# Patient Record
Sex: MALE | Employment: OTHER | URBAN - METROPOLITAN AREA
[De-identification: names, ages, dates, MRNs, and addresses within clinical notes are randomized per-mention and may not be internally consistent; named-entity substitution may affect disease eponyms.]

---

## 2017-06-23 ENCOUNTER — HOSPITAL ENCOUNTER (OUTPATIENT)
Dept: CARDIOLOGY | Facility: MEDICAL CENTER | Age: 57
End: 2017-06-23
Attending: INTERNAL MEDICINE
Payer: COMMERCIAL

## 2017-06-23 DIAGNOSIS — C49.A2 GASTROINTESTINAL STROMAL TUMOR OF STOMACH (HCC): ICD-10-CM

## 2017-06-23 PROCEDURE — 93306 TTE W/DOPPLER COMPLETE: CPT

## 2017-06-23 PROCEDURE — 93306 TTE W/DOPPLER COMPLETE: CPT | Mod: 26 | Performed by: INTERNAL MEDICINE

## 2017-06-24 LAB
LV EJECT FRACT  99904: 70
LV EJECT FRACT MOD 2C 99903: 70.32
LV EJECT FRACT MOD 4C 99902: 71.28
LV EJECT FRACT MOD BP 99901: 71.79

## 2017-12-26 ENCOUNTER — HOSPITAL ENCOUNTER (OUTPATIENT)
Dept: RADIOLOGY | Facility: MEDICAL CENTER | Age: 57
End: 2017-12-26
Attending: INTERNAL MEDICINE
Payer: COMMERCIAL

## 2017-12-26 DIAGNOSIS — C49.A9 GASTROINTESTINAL STROMAL TUMOR OF OTHER SITES (HCC): ICD-10-CM

## 2017-12-26 PROCEDURE — 76870 US EXAM SCROTUM: CPT

## 2018-05-14 ENCOUNTER — HOSPITAL ENCOUNTER (OUTPATIENT)
Dept: RADIOLOGY | Facility: MEDICAL CENTER | Age: 58
End: 2018-05-14
Attending: SURGERY
Payer: COMMERCIAL

## 2018-05-14 DIAGNOSIS — C49.A2 MALIGNANT GASTRIC STROMAL TUMOR (HCC): ICD-10-CM

## 2018-05-14 PROCEDURE — 700117 HCHG RX CONTRAST REV CODE 255: Performed by: SURGERY

## 2018-05-14 PROCEDURE — 71260 CT THORAX DX C+: CPT

## 2018-05-14 RX ADMIN — IOHEXOL 100 ML: 350 INJECTION, SOLUTION INTRAVENOUS at 15:56

## 2018-05-22 ENCOUNTER — HOSPITAL ENCOUNTER (OUTPATIENT)
Facility: MEDICAL CENTER | Age: 58
End: 2018-05-22
Attending: INTERNAL MEDICINE
Payer: COMMERCIAL

## 2018-05-22 PROCEDURE — 80053 COMPREHEN METABOLIC PANEL: CPT

## 2018-05-23 LAB
ALBUMIN SERPL BCP-MCNC: 4.3 G/DL (ref 3.2–4.9)
ALBUMIN/GLOB SERPL: 2 G/DL
ALP SERPL-CCNC: 76 U/L (ref 30–99)
ALT SERPL-CCNC: 19 U/L (ref 2–50)
ANION GAP SERPL CALC-SCNC: 5 MMOL/L (ref 0–11.9)
AST SERPL-CCNC: 16 U/L (ref 12–45)
BILIRUB SERPL-MCNC: 0.7 MG/DL (ref 0.1–1.5)
BUN SERPL-MCNC: 15 MG/DL (ref 8–22)
CALCIUM SERPL-MCNC: 9.5 MG/DL (ref 8.5–10.5)
CHLORIDE SERPL-SCNC: 107 MMOL/L (ref 96–112)
CO2 SERPL-SCNC: 26 MMOL/L (ref 20–33)
CREAT SERPL-MCNC: 1.01 MG/DL (ref 0.5–1.4)
GLOBULIN SER CALC-MCNC: 2.1 G/DL (ref 1.9–3.5)
GLUCOSE SERPL-MCNC: 94 MG/DL (ref 65–99)
POTASSIUM SERPL-SCNC: 4.1 MMOL/L (ref 3.6–5.5)
PROT SERPL-MCNC: 6.4 G/DL (ref 6–8.2)
SODIUM SERPL-SCNC: 138 MMOL/L (ref 135–145)

## 2018-06-07 DIAGNOSIS — J45.909 ASTHMA, UNSPECIFIED ASTHMA SEVERITY, UNSPECIFIED WHETHER COMPLICATED, UNSPECIFIED WHETHER PERSISTENT: ICD-10-CM

## 2018-06-20 ENCOUNTER — APPOINTMENT (RX ONLY)
Dept: URBAN - METROPOLITAN AREA CLINIC 4 | Facility: CLINIC | Age: 58
Setting detail: DERMATOLOGY
End: 2018-06-20

## 2018-06-20 DIAGNOSIS — D18.0 HEMANGIOMA: ICD-10-CM

## 2018-06-20 DIAGNOSIS — L57.0 ACTINIC KERATOSIS: ICD-10-CM

## 2018-06-20 DIAGNOSIS — D22 MELANOCYTIC NEVI: ICD-10-CM

## 2018-06-20 DIAGNOSIS — L81.4 OTHER MELANIN HYPERPIGMENTATION: ICD-10-CM

## 2018-06-20 DIAGNOSIS — L82.1 OTHER SEBORRHEIC KERATOSIS: ICD-10-CM

## 2018-06-20 PROBLEM — D18.01 HEMANGIOMA OF SKIN AND SUBCUTANEOUS TISSUE: Status: ACTIVE | Noted: 2018-06-20

## 2018-06-20 PROBLEM — J45.909 UNSPECIFIED ASTHMA, UNCOMPLICATED: Status: ACTIVE | Noted: 2018-06-20

## 2018-06-20 PROBLEM — D22.5 MELANOCYTIC NEVI OF TRUNK: Status: ACTIVE | Noted: 2018-06-20

## 2018-06-20 PROCEDURE — ? LIQUID NITROGEN

## 2018-06-20 PROCEDURE — 17003 DESTRUCT PREMALG LES 2-14: CPT

## 2018-06-20 PROCEDURE — 99202 OFFICE O/P NEW SF 15 MIN: CPT | Mod: 25

## 2018-06-20 PROCEDURE — 17000 DESTRUCT PREMALG LESION: CPT

## 2018-06-20 PROCEDURE — ? COUNSELING

## 2018-06-20 ASSESSMENT — LOCATION SIMPLE DESCRIPTION DERM
LOCATION SIMPLE: ANTERIOR SCALP
LOCATION SIMPLE: LEFT BUTTOCK
LOCATION SIMPLE: LEFT FOREHEAD
LOCATION SIMPLE: RIGHT BUTTOCK
LOCATION SIMPLE: RIGHT FOREHEAD
LOCATION SIMPLE: CHEST
LOCATION SIMPLE: SCALP
LOCATION SIMPLE: RIGHT SCALP
LOCATION SIMPLE: UPPER BACK
LOCATION SIMPLE: RIGHT HAND
LOCATION SIMPLE: RIGHT EYEBROW
LOCATION SIMPLE: LEFT POSTERIOR THIGH

## 2018-06-20 ASSESSMENT — LOCATION ZONE DERM
LOCATION ZONE: TRUNK
LOCATION ZONE: SCALP
LOCATION ZONE: LEG
LOCATION ZONE: HAND
LOCATION ZONE: FACE

## 2018-06-20 ASSESSMENT — LOCATION DETAILED DESCRIPTION DERM
LOCATION DETAILED: RIGHT CENTRAL PARIETAL SCALP
LOCATION DETAILED: RIGHT LATERAL EYEBROW
LOCATION DETAILED: MID-FRONTAL SCALP
LOCATION DETAILED: LEFT MEDIAL INFERIOR CHEST
LOCATION DETAILED: RIGHT RADIAL DORSAL HAND
LOCATION DETAILED: RIGHT SUPERIOR FOREHEAD
LOCATION DETAILED: LEFT INFERIOR LATERAL FOREHEAD
LOCATION DETAILED: LEFT SUPERIOR PARIETAL SCALP
LOCATION DETAILED: RIGHT CENTRAL FRONTAL SCALP
LOCATION DETAILED: LEFT INFERIOR MEDIAL FOREHEAD
LOCATION DETAILED: LEFT BUTTOCK
LOCATION DETAILED: RIGHT BUTTOCK
LOCATION DETAILED: INFERIOR THORACIC SPINE
LOCATION DETAILED: LEFT DISTAL POSTERIOR THIGH

## 2018-06-20 NOTE — PROCEDURE: LIQUID NITROGEN
Number Of Freeze-Thaw Cycles: 1 freeze-thaw cycle
Post-Care Instructions: I reviewed with the patient in detail post-care instructions. Patient is to wear sunprotection, and avoid picking at any of the treated lesions. Pt may apply Vaseline to crusted or scabbing areas.
Consent: The patient's consent was obtained including but not limited to risks of crusting, scabbing, blistering, scarring, darker or lighter pigmentary change, recurrence, incomplete removal and infection.
Detail Level: Detailed
Render Post-Care Instructions In Note?: yes
Duration Of Freeze Thaw-Cycle (Seconds): 5

## 2018-06-25 DIAGNOSIS — J45.909 UNCOMPLICATED ASTHMA, UNSPECIFIED ASTHMA SEVERITY, UNSPECIFIED WHETHER PERSISTENT: ICD-10-CM

## 2018-06-26 ENCOUNTER — APPOINTMENT (OUTPATIENT)
Dept: PULMONOLOGY | Facility: HOSPICE | Age: 58
End: 2018-06-26
Payer: COMMERCIAL

## 2018-08-29 ENCOUNTER — OFFICE VISIT (OUTPATIENT)
Dept: MEDICAL GROUP | Facility: PHYSICIAN GROUP | Age: 58
End: 2018-08-29
Payer: COMMERCIAL

## 2018-08-29 VITALS
HEIGHT: 67 IN | HEART RATE: 68 BPM | SYSTOLIC BLOOD PRESSURE: 118 MMHG | DIASTOLIC BLOOD PRESSURE: 84 MMHG | OXYGEN SATURATION: 98 % | TEMPERATURE: 97.7 F | RESPIRATION RATE: 16 BRPM | WEIGHT: 179 LBS | BODY MASS INDEX: 28.09 KG/M2

## 2018-08-29 DIAGNOSIS — Z23 NEED FOR VACCINATION WITH 13-POLYVALENT PNEUMOCOCCAL CONJUGATE VACCINE: ICD-10-CM

## 2018-08-29 DIAGNOSIS — C49.A2 GASTROINTESTINAL STROMAL TUMOR OF STOMACH (HCC): ICD-10-CM

## 2018-08-29 DIAGNOSIS — J45.909 ASTHMA WITHOUT STATUS ASTHMATICUS WITHOUT COMPLICATION, UNSPECIFIED ASTHMA SEVERITY, UNSPECIFIED WHETHER PERSISTENT: ICD-10-CM

## 2018-08-29 DIAGNOSIS — Z23 NEED FOR TDAP VACCINATION: ICD-10-CM

## 2018-08-29 DIAGNOSIS — Z00.00 WELLNESS EXAMINATION: ICD-10-CM

## 2018-08-29 PROBLEM — R91.8 MULTIPLE NODULES OF LUNG: Status: ACTIVE | Noted: 2018-05-22

## 2018-08-29 PROCEDURE — 90472 IMMUNIZATION ADMIN EACH ADD: CPT | Performed by: INTERNAL MEDICINE

## 2018-08-29 PROCEDURE — 90715 TDAP VACCINE 7 YRS/> IM: CPT | Performed by: INTERNAL MEDICINE

## 2018-08-29 PROCEDURE — 90471 IMMUNIZATION ADMIN: CPT | Performed by: INTERNAL MEDICINE

## 2018-08-29 PROCEDURE — 99203 OFFICE O/P NEW LOW 30 MIN: CPT | Mod: 25 | Performed by: INTERNAL MEDICINE

## 2018-08-29 PROCEDURE — 90670 PCV13 VACCINE IM: CPT | Performed by: INTERNAL MEDICINE

## 2018-08-29 RX ORDER — METRONIDAZOLE 500 MG/1
TABLET ORAL
COMMUNITY
End: 2018-08-26

## 2018-08-29 RX ORDER — ONDANSETRON HYDROCHLORIDE 8 MG/1
TABLET, FILM COATED ORAL
COMMUNITY
End: 2018-08-27

## 2018-08-29 RX ORDER — ALBUTEROL SULFATE 2.5 MG/3ML
SOLUTION RESPIRATORY (INHALATION)
COMMUNITY
End: 2022-11-15 | Stop reason: SDUPTHER

## 2018-08-29 RX ORDER — ALBUTEROL SULFATE 90 UG/1
1 AEROSOL, METERED RESPIRATORY (INHALATION) EVERY 6 HOURS PRN
Qty: 3 INHALER | Refills: 1 | Status: SHIPPED | OUTPATIENT
Start: 2018-08-29 | End: 2018-08-29 | Stop reason: SDUPTHER

## 2018-08-29 RX ORDER — ALBUTEROL SULFATE 90 UG/1
AEROSOL, METERED RESPIRATORY (INHALATION)
COMMUNITY
End: 2018-08-29 | Stop reason: SDUPTHER

## 2018-08-29 RX ORDER — SILDENAFIL 100 MG/1
TABLET, FILM COATED ORAL
COMMUNITY
End: 2019-07-10

## 2018-08-29 RX ORDER — IMATINIB MESYLATE 400 MG/1
TABLET, FILM COATED ORAL
Refills: 6 | COMMUNITY
Start: 2018-08-20 | End: 2018-08-26

## 2018-08-29 RX ORDER — ALBUTEROL SULFATE 90 UG/1
AEROSOL, METERED RESPIRATORY (INHALATION)
COMMUNITY
End: 2018-08-26

## 2018-08-29 RX ORDER — ALBUTEROL SULFATE 90 UG/1
1 AEROSOL, METERED RESPIRATORY (INHALATION) EVERY 6 HOURS PRN
Qty: 3 INHALER | Refills: 3 | Status: SHIPPED | OUTPATIENT
Start: 2018-08-29 | End: 2019-05-30 | Stop reason: SDUPTHER

## 2018-08-29 RX ORDER — IMATINIB MESYLATE 400 MG/1
TABLET, FILM COATED ORAL
COMMUNITY
End: 2022-11-15

## 2018-08-29 RX ORDER — HYDROCODONE BITARTRATE AND ACETAMINOPHEN 7.5; 325 MG/1; MG/1
TABLET ORAL
COMMUNITY
End: 2018-08-26

## 2018-08-29 ASSESSMENT — PATIENT HEALTH QUESTIONNAIRE - PHQ9: CLINICAL INTERPRETATION OF PHQ2 SCORE: 0

## 2018-08-29 NOTE — LETTER
[facility]  Clif Tejeda M.D.  [address]  [place] [address]  Fax: [phone]   Authorization for Release/Disclosure of   Protected Health Information   Name: JUNE PACE : 1960 SSN: [id number]   Address: [address] [place] [address] [place] [address] Phone:    [phone] (home)    I authorize the entity listed below to release/disclose the PHI below to:   [facility]/Clif Tejeda M.D. and Clif Tejeda M.D.   Provider or Entity Name:  [facility]   Address   [facility], [facility], Zip            [address] [facility] [place] [address] Phone:  [phone]      Fax:       [phone]          Reason for request: continuity of care   Information to be released:    [ X ] LAST COLONOSCOPY,  including any PATH REPORT and follow-up  [ X ] LAST FIT/COLOGUARD RESULT [  ] LAST DEXA  [  ] LAST MAMMOGRAM  [  ] LAST PAP  [  ] LAST LABS [  ] RETINA EXAM REPORT  [  ] IMMUNIZATION RECORDS  [  ] Release all info      [  ] Check here and initial the line next to each item to release ALL health information INCLUDING  _____ Care and treatment for drug and / or alcohol abuse  _____ HIV testing, infection status, or AIDS  _____ Genetic Testing    DATES OF SERVICE OR TIME PERIOD TO BE DISCLOSED: _____________  I understand and acknowledge that:  * This Authorization may be revoked at any time by you in writing, except if your health information has already been used or disclosed.  * Your health information that will be used or disclosed as a result of you signing this authorization could be re-disclosed by the recipient. If this occurs, your re-disclosed health information may no longer be protected by State or Federal laws.  * You may refuse to sign this Authorization. Your refusal will not affect your ability to obtain treatment.  * This Authorization becomes effective upon signing and will  on (date) __________.      If no date is indicated, this Authorization will  one (1) year from the signature date.     Name: Prashant Shah Kash       Date:     8/29/2018       PLEASE FAX REQUESTED RECORDS BACK TO: [phone]

## 2018-08-29 NOTE — ASSESSMENT & PLAN NOTE
Has an appointment tomorrow with Dr. Easley. Has had asthma since he was an infant. He is on advair and takes one puff of ventolin every morning. Rarely needs the ventolin for emergencies.

## 2018-08-29 NOTE — LETTER
[facility]  Clif Tejeda M.D.  [address]  [facility] [address]  Fax: [phone]   Authorization for Release/Disclosure of   Protected Health Information   Name: JUNE PACE : 1960 SSN: [id number]   Address: [address] [place] [address] [place] [address] Phone:    [phone] (home)    I authorize the entity listed below to release/disclose the PHI below to:   [facility]/Clif Tejeda M.D. and Clif Tejeda M.D.   Provider or Entity Name:  [facility]   Address   City, [facility] [address], Zip:               [address] [place] [address]   Phone:  [phone]      Fax:      [phone]        Reason for request: continuity of care   Information to be released:    [ X ] LAST COLONOSCOPY,  including any PATH REPORT and follow-up  [ X ] LAST FIT/COLOGUARD RESULT [  ] LAST DEXA  [  ] LAST MAMMOGRAM  [  ] LAST PAP  [  ] LAST LABS [  ] RETINA EXAM REPORT  [  ] IMMUNIZATION RECORDS  [  ] Release all info      [  ] Check here and initial the line next to each item to release ALL health information INCLUDING  _____ Care and treatment for drug and / or alcohol abuse  _____ HIV testing, infection status, or AIDS  _____ Genetic Testing    DATES OF SERVICE OR TIME PERIOD TO BE DISCLOSED: _____________  I understand and acknowledge that:  * This Authorization may be revoked at any time by you in writing, except if your health information has already been used or disclosed.  * Your health information that will be used or disclosed as a result of you signing this authorization could be re-disclosed by the recipient. If this occurs, your re-disclosed health information may no longer be protected by State or Federal laws.  * You may refuse to sign this Authorization. Your refusal will not affect your ability to obtain treatment.  * This Authorization becomes effective upon signing and will  on (date) __________.      If no date is indicated, this Authorization will  one (1) year from the  signature date.    Name: Prashant Hewitt     Date:     8/29/2018       PLEASE FAX REQUESTED RECORDS BACK TO: [phone]

## 2018-08-29 NOTE — ASSESSMENT & PLAN NOTE
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 Is following with his oncologist Dr. Jin. Has been on gleevec for about a year.

## 2018-08-29 NOTE — PROGRESS NOTES
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 Is following with his oncologist Dr. Jin. Has been on gleevec for about a year.     Asthma without status asthmaticus  Has an appointment tomorrow with Dr. Easley. Has had asthma since he was an infant. He is on advair and takes one puff of ventolin every morning. Rarely needs the ventolin for emergencies.       Current Outpatient Prescriptions   Medication Sig Dispense Refill   • imatinib (GLEEVEC) 400 MG tablet imatinib 400 mg tablet     • sildenafil citrate (VIAGRA) 100 MG tablet Viagra 100 mg tablet   TAKE 1 TABLET BY MOUTH BEFORE SEXUAL ACTIVITY     • fluticasone-salmeterol (ADVAIR DISKUS) 250-50 MCG/DOSE AEROSOL POWDER, BREATH ACTIVATED Inhale 1 Puff by mouth 2 times a day. 3 Inhaler 0   • albuterol (VENTOLIN HFA) 108 (90 Base) MCG/ACT Aero Soln inhalation aerosol Inhale 1 Puff by mouth every 6 hours as needed for Shortness of Breath. 3 Inhaler 1   • albuterol (PROVENTIL) 2.5mg/3ml Nebu Soln solution for nebulization albuterol sulfate 2.5 mg/3 mL (0.083 %) solution for nebulization       No current facility-administered medications for this visit.        Past Medical History:   Diagnosis Date   • Asthma without status asthmaticus 5/22/2018   • Gastrointestinal stromal tumor of stomach (HCC) 4/4/2017 March 2017 18 cm GIST tumor removed Dr. Jin, Dr. Isabel (surgeon)      Past Surgical History:   Procedure Laterality Date   • CHOLECYSTECTOMY     • FINGER AMPUTATION Right     right index accident when 3 yo   • OTHER      GIST removal 3/2017   • SEPTOPLASTY       Social History   Substance Use Topics   • Smoking status: Never Smoker   • Smokeless tobacco: Never Used   • Alcohol use No      Comment: not while on gleevec      Social History     Social History Narrative  "   ; owns own business      Family History   Problem Relation Age of Onset   • Hypertension Mother    • Other Mother         glaucoma    • Cancer Father         pancreatic      Family Status   Relation Status   • Mo Alive   • Fa  at age 80   • Sis Alive   • Bro Alive   • Sis Alive   • Sis Alive   • Sis Alive     Allergies: Penicillins and Succinylcholine    ROS  Constitutional: Negative for fatigue/generalized weakness.   HEENT: Negative for  vision changes, hearing changes    Respiratory: Negative for shortness of breath  Cardiovascular: Negative for chest pain, palpitations  Gastrointestinal: Negative for blood in stool, constipation, diarrhea  Genitourinary: Negative for dysuria, polyuria  Musculoskeletal: Negative for myalgias, back pain, and joint pain.   Skin: Negative for rash  Neurological: Negative for numbness, tingling  Psychiatric/Behavioral: Negative for depression, anxiety       Objective   Blood pressure 118/84, pulse 68, temperature 36.5 °C (97.7 °F), resp. rate 16, height 1.7 m (5' 6.93\"), weight 81.2 kg (179 lb), SpO2 98 %. Body mass index is 28.09 kg/m².    General: Alert, oriented. In no acute distress   HEET: EOMI, PERRL, conjunctiva non-injected, sclera non-icteric.  Nares patent with no significant congestion or drainage.  Ivory pinnae, external auditory canals, TM pearly gray with normal light reflex bilaterally.Oral mucous membranes pink and moist with no lesions.  Neck: supple with no cervical, subclavicular lymphadenopathy, JVD, palpable thyroid nodules   Lungs: clear to auscultation bilaterally with good excursion.  CV: regular rate and rhythm.  Abdomen soft, non-distended, non-tender with normal bowel sounds. No hepatosplenomegaly, no masses palpated  Skin: no lesions. Warm, dry   Psychiatric: appropriate mood and affect       Assessment and Plan   The following treatment plan was discussed     1. Gastrointestinal stromal tumor of stomach (HCC)  On " gleevec, following with Dr. Jin.     2. Need for vaccination with 13-polyvalent pneumococcal conjugate vaccine  Given today.   - Prevnar 13 PCV-13    3. Wellness examination  Had normal CMP through Dr. Jin 5/2018. Will complete remainder of his blood work panel with the following.   - CBC WITH DIFFERENTIAL; Future  - LIPID PROFILE; Future  - VITAMIN D,25 HYDROXY; Future    4. Asthma without status asthmaticus without complication, unspecified asthma severity, unspecified whether persistent  Paper prescriptions given per patient request.   - fluticasone-salmeterol (ADVAIR DISKUS) 250-50 MCG/DOSE AEROSOL POWDER, BREATH ACTIVATED; Inhale 1 Puff by mouth 2 times a day.  Dispense: 3 Inhaler; Refill: 0  - albuterol (VENTOLIN HFA) 108 (90 Base) MCG/ACT Aero Soln inhalation aerosol; Inhale 1 Puff by mouth every 6 hours as needed for Shortness of Breath.  Dispense: 3 Inhaler; Refill: 1    5. Need for Tdap vaccination  Given today.   - TDAP VACCINE =>6YO IM      Return in about 1 year (around 8/29/2019).    Health Maintenance      Health Maintenance Due   Topic Date Due   • COLONOSCOPY  11/30/2010   • IMM ZOSTER VACCINES (1 of 2) 11/30/2010   • IMM INFLUENZA (1) 09/01/2018     Discussed Shingrix (presently on back order in this office)  Will request colonoscopy records    Clif Tejeda MD  Internal Medicine  [facility]

## 2018-08-30 ENCOUNTER — APPOINTMENT (OUTPATIENT)
Dept: RADIOLOGY | Facility: IMAGING CENTER | Age: 58
End: 2018-08-30
Payer: COMMERCIAL

## 2018-08-30 ENCOUNTER — NON-PROVIDER VISIT (OUTPATIENT)
Dept: PULMONOLOGY | Facility: HOSPICE | Age: 58
End: 2018-08-30
Payer: COMMERCIAL

## 2018-08-30 ENCOUNTER — OFFICE VISIT (OUTPATIENT)
Dept: PULMONOLOGY | Facility: HOSPICE | Age: 58
End: 2018-08-30
Payer: COMMERCIAL

## 2018-08-30 VITALS
BODY MASS INDEX: 27.94 KG/M2 | TEMPERATURE: 97.2 F | HEART RATE: 73 BPM | RESPIRATION RATE: 16 BRPM | DIASTOLIC BLOOD PRESSURE: 80 MMHG | WEIGHT: 178 LBS | HEIGHT: 67 IN | OXYGEN SATURATION: 96 % | SYSTOLIC BLOOD PRESSURE: 126 MMHG

## 2018-08-30 VITALS — HEIGHT: 67 IN | WEIGHT: 178 LBS | BODY MASS INDEX: 27.94 KG/M2

## 2018-08-30 DIAGNOSIS — Z78.9 NONSMOKER: ICD-10-CM

## 2018-08-30 DIAGNOSIS — J45.909 ASTHMA, UNSPECIFIED ASTHMA SEVERITY, UNSPECIFIED WHETHER COMPLICATED, UNSPECIFIED WHETHER PERSISTENT: ICD-10-CM

## 2018-08-30 DIAGNOSIS — C49.A2 GASTROINTESTINAL STROMAL TUMOR OF STOMACH (HCC): ICD-10-CM

## 2018-08-30 DIAGNOSIS — J45.40 MODERATE PERSISTENT ASTHMA WITHOUT COMPLICATION: ICD-10-CM

## 2018-08-30 DIAGNOSIS — J45.909 UNCOMPLICATED ASTHMA, UNSPECIFIED ASTHMA SEVERITY, UNSPECIFIED WHETHER PERSISTENT: ICD-10-CM

## 2018-08-30 DIAGNOSIS — R91.8 PULMONARY NODULES: ICD-10-CM

## 2018-08-30 PROCEDURE — 71046 X-RAY EXAM CHEST 2 VIEWS: CPT | Mod: TC,FY | Performed by: INTERNAL MEDICINE

## 2018-08-30 PROCEDURE — 94726 PLETHYSMOGRAPHY LUNG VOLUMES: CPT | Performed by: INTERNAL MEDICINE

## 2018-08-30 PROCEDURE — 99244 OFF/OP CNSLTJ NEW/EST MOD 40: CPT | Mod: 25 | Performed by: INTERNAL MEDICINE

## 2018-08-30 PROCEDURE — 94729 DIFFUSING CAPACITY: CPT | Performed by: INTERNAL MEDICINE

## 2018-08-30 PROCEDURE — 94060 EVALUATION OF WHEEZING: CPT | Performed by: INTERNAL MEDICINE

## 2018-08-30 ASSESSMENT — PULMONARY FUNCTION TESTS
FEV1/FVC_PERCENT_CHANGE: 250
FEV1_PERCENT_CHANGE: 2
FEV1/FVC_PERCENT_PREDICTED: 63
FEV1_PREDICTED: 3.31
FVC: 3.34
FEV1_LLN: 2.76
FEV1_PERCENT_PREDICTED: 50
FEV1: 1.66
FVC_PERCENT_PREDICTED: 75
FEV1/FVC: 48
FEV1_PERCENT_PREDICTED: 47
FEV1/FVC_PERCENT_PREDICTED: 63
FEV1_PERCENT_CHANGE: 5
FEV1/FVC_PERCENT_PREDICTED: 65
FVC: 3.27
FEV1/FVC: 50
FEV1/FVC_PERCENT_LLN: 63
FVC_PERCENT_PREDICTED: 76
FVC_PREDICTED: 4.35
FEV1/FVC_PREDICTED: 76
FEV1/FVC_PERCENT_CHANGE: 3
FEV1: 1.57
FEV1/FVC: 48
FEV1/FVC: 49.7
FEV1/FVC_PERCENT_PREDICTED: 66
FVC_LLN: 3.63
FEV1/FVC_PERCENT_PREDICTED: 76

## 2018-08-30 NOTE — PROGRESS NOTES
"Chief Complaint   Patient presents with   • Establish Care     Referred by  for Asthma,Nodules       HPI: This patient is a pleasant 57 y.o. Male who is referred to lung nodule clinic for asthma and pulmonary nodules.  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  He had a CT chest/abdomen on May 14, 2018 which was personally reviewed and showed clustered nodules in the left upper lobe, with bilateral apical pleural scarring, and multiple calcified nodules, the latter consistent with granulomas.  [place]  He has had asthma since the age of 3, and has been on Advair 250/50 for over 20 years.  Growing up he had multiple episodes of \"pneumonia\".  He uses Advair 250/50 QD only with Albuterol HFA prophylactically before Advair.  He is a lifelong non-smoker.  He denies asthma exacerbations over the past year, however feels asthma symptoms have gradually worsened over the past years. Asthma triggers include exercise and \"extreme cold\".  Asthma symptoms include wheezing and chest tightness.  He denies any significant cough. He walks a mile daily.  Pulmonary function testing show moderately severe obstructive airways with FEV1 1.66 L or 50%, FEV1/FVC :50% predicted.  Denies fevers, chills, night sweats.  He had significant weight loss following his surgery, however denies any further weight loss in the past year.       Past Medical History:   Diagnosis Date   • Asthma without status asthmaticus 5/22/2018   • Bronchitis    • Chickenpox    • Gastrointestinal stromal tumor of stomach (HCC) 4/4/2017 March 2017 18 cm GIST tumor removed Dr. Jin, Dr. Isabel (surgeon)    • GERD (gastroesophageal reflux disease)    • Mumps    • Pneumonia        Social History     Social History   • Marital status:      Spouse name: N/A   • Number of children: N/A   • Years of education: N/A     Occupational " History   • Not on file.     Social History Main Topics   • Smoking status: Never Smoker   • Smokeless tobacco: Never Used   • Alcohol use No      Comment: not while on gleevec    • Drug use: No   • Sexual activity: Not on file     Other Topics Concern   • Not on file     Social History Narrative    ; owns own business        Family History   Problem Relation Age of Onset   • Hypertension Mother    • Other Mother         glaucoma    • Cancer Father         pancreatic    • Hypertension Brother        Current Outpatient Prescriptions on File Prior to Visit   Medication Sig Dispense Refill   • albuterol (PROVENTIL) 2.5mg/3ml Nebu Soln solution for nebulization albuterol sulfate 2.5 mg/3 mL (0.083 %) solution for nebulization     • imatinib (GLEEVEC) 400 MG tablet imatinib 400 mg tablet     • sildenafil citrate (VIAGRA) 100 MG tablet Viagra 100 mg tablet   TAKE 1 TABLET BY MOUTH BEFORE SEXUAL ACTIVITY     • fluticasone-salmeterol (ADVAIR DISKUS) 250-50 MCG/DOSE AEROSOL POWDER, BREATH ACTIVATED Inhale 1 Puff by mouth 2 times a day. 3 Inhaler 3   • albuterol (VENTOLIN HFA) 108 (90 Base) MCG/ACT Aero Soln inhalation aerosol Inhale 1 Puff by mouth every 6 hours as needed for Shortness of Breath. 3 Inhaler 3     No current facility-administered medications on file prior to visit.        Allergies: Penicillins and Succinylcholine    ROS:   Constitutional: Denies fevers, chills, night sweats, fatigue or weight loss  Eyes: Denies vision loss, pain, drainage, double vision  Ears, Nose, Throat: Denies earache, difficulty hearing, +tinnitus, denies nasal congestion, hoarseness  Cardiovascular: Denies chest pain, tightness, palpitations, orthopnea or edema  Respiratory: As in HPI  Sleep: Denies daytime sleepiness, snoring, apneas, insomnia, morning headaches  GI: Denies heartburn, dysphagia, nausea, +abdominal pain, denies diarrhea or constipation  : Denies frequent urination, hematuria, discharge or  "painful urination  Musculoskeletal: Denies back pain, painful joints, sore muscles  Neurological: Denies weakness or headaches  Skin: No rashes    Blood pressure 126/80, pulse 73, temperature 36.2 °C (97.2 °F), resp. rate 16, height 1.702 m (5' 7\"), weight 80.7 kg (178 lb), SpO2 96 %.    Physical Exam:  Appearance: Well-nourished, well-developed, in no acute distress  HEENT: Normocephalic, atraumatic, white sclera, PERRLA, oropharynx clear  Neck: No adenopathy or masses  Respiratory: no intercostal retractions or accessory muscle use  Lungs auscultation: Clear to auscultation bilaterally  Cardiovascular: Regular rate rhythm. No murmurs, rubs or gallops.  No LE edema  Abdomen: soft, nondistended  Gait: Normal  Digits: No clubbing, cyanosis  Motor: No focal deficits  Orientation: Oriented to time, person and place    Diagnosis:  1. Pulmonary nodules  QUANTIFERON TB GOLD (IN TUBE)    COCCIDIOIDAL SEROLOGY    HISTOPLASMA ABS QN DID    ASPERGILLUS AB QN DID   2. Moderate persistent asthma without complication  Fluticasone-Umeclidin-Vilant (TRELEGY ELLIPTA) 100-62.5-25 MCG/INH AEROSOL POWDER, BREATH ACTIVATED    AMB SPIROMETRY   3. Gastrointestinal stromal tumor of stomach (HCC)     4. Nonsmoker         Plan:  The patient has incidental finding of clustered pulmonary nodules on chest imaging.  He also has calcified nodules, consistent with benign granulomas.  He has a history of gastrointestinal stromal tumor, however these rarely metastasize to lung (2%).  He is immunosuppressed on chemotherapy, and infection vs postinflammatory nodules are more likely than malignancy.  Recommend infectious workup with QuantiFERON TB Gold, histoplasma, aspergillus and Coccidioides antibodies.  At this time recommend follow-up chest imaging within 3 months to reassess.  His asthma symptoms have gradually worsened and his pulmonary function testing shows significant pulmonary impairment, with FEV1 1.66 L or 50% predicted.  Recommend " discontinuing Advair and starting Trelegy Ellipta 1 puff QD to maximize inhaled bronchodilator therapy.  He will follow up with spirometry.  Return in about 2 months (around 10/30/2018) for with spirometry.

## 2018-08-30 NOTE — PROCEDURES
"Technician Nimo Miller, [facility]  Tech Comments:Good patient effort & cooperation. Test was performed on the Ensemble Discovery Body Plethysmograph-Elite DX system.    The predicted sets used for Spirometry are NHanesIII, for Lung Volumes are ITS, and for DLCO is [facility].  The results of this test appear to be valid, although the ATS standard for \"end of test\" was not met.  The DLCO was uncorrected for Hb.   A bronchodilator of Ventolin HFA- 2puffs via spacer was administered. The DLCO was performed during the dialation period.       The FVC is 3.34 L or 76%, FEV1 is 1.66 L or 50%, FEV1/FVC 50%.  %.  DLCO 131%.  No significant bronchodilator response.    Interpretation:  PFT show moderately severe obstructive ventilatory defect.  "

## 2018-09-04 ENCOUNTER — TELEPHONE (OUTPATIENT)
Dept: PULMONOLOGY | Facility: HOSPICE | Age: 58
End: 2018-09-04

## 2018-09-04 NOTE — TELEPHONE ENCOUNTER
Gwendolyn with [facility] left a voicemail for this patient. In regards to the Trelegy inhaler. She would like to know the tried and failed medications for this patient. May need a prior auth started for trelegy inhaler. Her phone number is [phone].

## 2018-09-10 ENCOUNTER — TELEPHONE (OUTPATIENT)
Dept: PULMONOLOGY | Facility: HOSPICE | Age: 58
End: 2018-09-10

## 2018-09-10 NOTE — TELEPHONE ENCOUNTER
MEDICATION PRIOR AUTHORIZATION NEEDED:    1. Name of Medication: Trelegy Ellipta 100/62.5/25 mcg    2. Requested By (Name of Pharmacy):      3. Is insurance on file current? yes    4. What is the name & phone number of the 3rd party payor? [facility] [phone]

## 2018-09-10 NOTE — TELEPHONE ENCOUNTER
DOCUMENTATION OF PRIOR AUTH STATUS    1. Medication name and dose: Trelegy Ellipta 100/62.5/25 mcg    2. Name and Phone # of Prescription coverage company: [employer] [phone]    3. Date Prior Auth was submitted:     4. What information was given to obtain insurance decision: I did not submit this    5. Prior Auth letter Approved or Denied: Denied    6. Pharmacy notified: No    7. Patient notified: No        Trelegy Ellipta was denied.  The patient needs to try Tudorza Pressair.  Dx is Asthma.  Please advise, thank you.

## 2018-09-11 NOTE — TELEPHONE ENCOUNTER
I spoke to the patient and let him know that Trelegy was denied by his insurance.  I told him that he needs to restart Advair and that we will send Tudorza to his pharmacy to use with the Advair.  He gave me his pharmacy and told me that is fine, but he wants to finish the Trelegy that he has on hand first.  I told him that I will send the Tudorza on Friday and he already has Advair.

## 2018-09-11 NOTE — TELEPHONE ENCOUNTER
He should not take Tudorza alone as he has asthma.    Let him know the Trelegy was not approved.    He needs to RESTART Advair and add the Tudorza.    Need pharmacy info to send RX

## 2018-09-14 DIAGNOSIS — J44.9 CHRONIC OBSTRUCTIVE PULMONARY DISEASE, UNSPECIFIED COPD TYPE (HCC): ICD-10-CM

## 2018-09-14 NOTE — TELEPHONE ENCOUNTER
Have we ever prescribed this med? No.  If yes, what date?     Last OV: 8/30/2018    Next OV: 11/8/2018    DX: COPD    Medications: Tudorza

## 2018-10-03 ENCOUNTER — HOSPITAL ENCOUNTER (OUTPATIENT)
Dept: LAB | Facility: MEDICAL CENTER | Age: 58
End: 2018-10-03
Attending: INTERNAL MEDICINE
Payer: COMMERCIAL

## 2018-10-03 DIAGNOSIS — R79.89 LOW VITAMIN D LEVEL: ICD-10-CM

## 2018-10-03 DIAGNOSIS — R91.8 PULMONARY NODULES: ICD-10-CM

## 2018-10-03 DIAGNOSIS — Z00.00 WELLNESS EXAMINATION: ICD-10-CM

## 2018-10-03 LAB
25(OH)D3 SERPL-MCNC: 14 NG/ML (ref 30–100)
BASOPHILS # BLD AUTO: 0.3 % (ref 0–1.8)
BASOPHILS # BLD: 0.01 K/UL (ref 0–0.12)
CHOLEST SERPL-MCNC: 173 MG/DL (ref 100–199)
EOSINOPHIL # BLD AUTO: 0.19 K/UL (ref 0–0.51)
EOSINOPHIL NFR BLD: 5 % (ref 0–6.9)
ERYTHROCYTE [DISTWIDTH] IN BLOOD BY AUTOMATED COUNT: 47.8 FL (ref 35.9–50)
HCT VFR BLD AUTO: 44.7 % (ref 42–52)
HDLC SERPL-MCNC: 37 MG/DL
HGB BLD-MCNC: 14.8 G/DL (ref 14–18)
IMM GRANULOCYTES # BLD AUTO: 0.01 K/UL (ref 0–0.11)
IMM GRANULOCYTES NFR BLD AUTO: 0.3 % (ref 0–0.9)
LDLC SERPL CALC-MCNC: 112 MG/DL
LYMPHOCYTES # BLD AUTO: 0.84 K/UL (ref 1–4.8)
LYMPHOCYTES NFR BLD: 22.3 % (ref 22–41)
MCH RBC QN AUTO: 31.5 PG (ref 27–33)
MCHC RBC AUTO-ENTMCNC: 33.1 G/DL (ref 33.7–35.3)
MCV RBC AUTO: 95.1 FL (ref 81.4–97.8)
MONOCYTES # BLD AUTO: 0.25 K/UL (ref 0–0.85)
MONOCYTES NFR BLD AUTO: 6.6 % (ref 0–13.4)
NEUTROPHILS # BLD AUTO: 2.47 K/UL (ref 1.82–7.42)
NEUTROPHILS NFR BLD: 65.5 % (ref 44–72)
NRBC # BLD AUTO: 0 K/UL
NRBC BLD-RTO: 0 /100 WBC
PLATELET # BLD AUTO: 208 K/UL (ref 164–446)
PMV BLD AUTO: 10.9 FL (ref 9–12.9)
RBC # BLD AUTO: 4.7 M/UL (ref 4.7–6.1)
TRIGL SERPL-MCNC: 118 MG/DL (ref 0–149)
WBC # BLD AUTO: 3.8 K/UL (ref 4.8–10.8)

## 2018-10-03 PROCEDURE — 82306 VITAMIN D 25 HYDROXY: CPT

## 2018-10-03 PROCEDURE — 36415 COLL VENOUS BLD VENIPUNCTURE: CPT

## 2018-10-03 PROCEDURE — 86606 ASPERGILLUS ANTIBODY: CPT | Mod: 91

## 2018-10-03 PROCEDURE — 86698 HISTOPLASMA ANTIBODY: CPT

## 2018-10-03 PROCEDURE — 86480 TB TEST CELL IMMUN MEASURE: CPT

## 2018-10-03 PROCEDURE — 86635 COCCIDIOIDES ANTIBODY: CPT | Mod: 91

## 2018-10-03 PROCEDURE — 85025 COMPLETE CBC W/AUTO DIFF WBC: CPT

## 2018-10-03 PROCEDURE — 80061 LIPID PANEL: CPT

## 2018-10-03 RX ORDER — ERGOCALCIFEROL 1.25 MG/1
50000 CAPSULE ORAL
Qty: 12 CAP | Refills: 0 | Status: SHIPPED | OUTPATIENT
Start: 2018-10-03 | End: 2019-07-10

## 2018-10-05 LAB
M TB TUBERC IFN-G BLD QL: NEGATIVE
M TB TUBERC IFN-G/MITOGEN IGNF BLD: 0
M TB TUBERC IGNF/MITOGEN IGNF CONTROL: 49.62 [IU]/ML
MITOGEN IGNF BCKGRD COR BLD-ACNC: 0.05 [IU]/ML

## 2018-10-06 ENCOUNTER — OFFICE VISIT (OUTPATIENT)
Dept: URGENT CARE | Facility: PHYSICIAN GROUP | Age: 58
End: 2018-10-06
Payer: COMMERCIAL

## 2018-10-06 VITALS
RESPIRATION RATE: 18 BRPM | TEMPERATURE: 97.9 F | HEART RATE: 64 BPM | SYSTOLIC BLOOD PRESSURE: 138 MMHG | OXYGEN SATURATION: 95 % | DIASTOLIC BLOOD PRESSURE: 76 MMHG

## 2018-10-06 DIAGNOSIS — B02.9 HERPES ZOSTER WITHOUT COMPLICATION: ICD-10-CM

## 2018-10-06 PROCEDURE — 99203 OFFICE O/P NEW LOW 30 MIN: CPT | Performed by: EMERGENCY MEDICINE

## 2018-10-06 RX ORDER — CALCIUM ACETATE MONOHYDRATE AND ALUMINUM SULFATE TETRADECAHYDRATE 952; 1347 MG/2299MG; MG/2299MG
1 POWDER, FOR SOLUTION TOPICAL 3 TIMES DAILY
Qty: 3 PACKET | Refills: 1 | Status: SHIPPED | OUTPATIENT
Start: 2018-10-06 | End: 2019-07-10

## 2018-10-06 RX ORDER — ACYCLOVIR 400 MG/1
800 TABLET ORAL
Qty: 100 TAB | Refills: 1 | Status: SHIPPED | OUTPATIENT
Start: 2018-10-06 | End: 2019-07-10

## 2018-10-06 ASSESSMENT — ENCOUNTER SYMPTOMS
ABDOMINAL PAIN: 0
BACK PAIN: 0
VOMITING: 0
PALPITATIONS: 0
NAUSEA: 0
CHILLS: 0
COUGH: 0
FEVER: 0

## 2018-10-06 NOTE — PROGRESS NOTES
Subjective:      Prashant Hewitt is a 57 y.o. male who presents with Rash (poss shingles )            HPI  Patient is a pleasant 57-year-old male complaining of a rash on his right lower abdomen radiating towards his groin. Skin present since yesterday vesicular in nature.  Patient thinks she may have herpes zoster was offered herpes vaccine recently but declined. Patient is currently undergoing chemotherapy for gastric cancer status post gastrectomy. Patient denies any fever chills nausea vomiting or diarrhea.    Past Medical History:   Diagnosis Date   • Asthma without status asthmaticus 5/22/2018   • Bronchitis    • Cancer (HCC)    • Chickenpox    • Gastrointestinal stromal tumor of stomach (HCC) 4/4/2017 March 2017 18 cm GIST tumor removed Dr. Jin, Dr. Isabel (surgeon)    • GERD (gastroesophageal reflux disease)    • Mumps    • Pneumonia       Review of Systems   Constitutional: Negative for chills and fever.   Respiratory: Negative for cough.    Cardiovascular: Negative for chest pain and palpitations.   Gastrointestinal: Negative for abdominal pain, nausea and vomiting.   Genitourinary: Negative.    Musculoskeletal: Negative for back pain.   Skin: Positive for rash. Negative for itching.        Painful right sided lower abdomen towards the groin, rash. Refer to the diagram.          Objective:     /76 (BP Location: Right arm, Patient Position: Sitting, BP Cuff Size: Small adult)   Pulse 64   Temp 36.6 °C (97.9 °F) (Temporal)   Resp 18   SpO2 95%      Physical Exam   Constitutional: He appears well-developed and well-nourished. No distress.       HENT:   Head: Normocephalic and atraumatic.   Right Ear: External ear normal.   Left Ear: External ear normal.   Cardiovascular: Normal rate and regular rhythm.    Pulmonary/Chest: Effort normal and breath sounds normal.   Musculoskeletal: Normal range of motion. He exhibits no tenderness.   Skin: Skin is warm. No rash noted. He is not diaphoretic.  There is erythema.   Patient has a 4 x 10 cm area of 1-2 mm raised erythematous lesions no obvious vesicles but the rash is only been present for one day. Refer to the diagram.   Psychiatric: He has a normal mood and affect.   Nursing note and vitals reviewed.              Assessment/Plan:     1. Herpes zoster without complication      Patient will be given acyclovir 5 times a day as well as Domeboro's packets to use on a when necessary basis. I told him this should last up to 10 days but could last longer and he could be left with postherpetic neuralgia he will consider vaccination once this is cleared up and talk it over with Dr. Tejeda  - acyclovir (ZOVIRAX) 400 MG tablet; Take 2 Tabs by mouth 5 Times a Day.  Dispense: 100 Tab; Refill: 1  - calcium acetate-aluminum sulfate (DOMEBORO) Pack; Apply 1 Packet to affected area(s) 3 times a day.  Dispense: 3 Packet; Refill: 1

## 2018-10-07 LAB
ASPERGILLUS AB SER QL ID: NORMAL
ASPERGILLUS AB TITR SER CF: NORMAL {TITER}
H CAPSUL AB TITR SER ID: NORMAL {TITER}

## 2018-10-10 LAB
C IMMITIS IGM SPEC QL IA: 0.5 IV
COCCIDIOIDES AB SPEC QL ID: NORMAL
COCCIDIOIDES AB TITR SER CF: NORMAL {TITER}
COCCIDIOIDES IGG SPEC QL IA: 0.3 IV

## 2018-11-05 ENCOUNTER — HOSPITAL ENCOUNTER (OUTPATIENT)
Dept: RADIOLOGY | Facility: MEDICAL CENTER | Age: 58
End: 2018-11-05
Attending: INTERNAL MEDICINE
Payer: COMMERCIAL

## 2018-11-05 DIAGNOSIS — C49.A0 GASTROINTESTINAL STROMAL TUMOR (GIST) (HCC): ICD-10-CM

## 2018-11-05 PROCEDURE — 71260 CT THORAX DX C+: CPT

## 2018-11-05 PROCEDURE — 700117 HCHG RX CONTRAST REV CODE 255: Performed by: INTERNAL MEDICINE

## 2018-11-05 RX ADMIN — IOHEXOL 100 ML: 350 INJECTION, SOLUTION INTRAVENOUS at 09:41

## 2018-11-05 RX ADMIN — IOHEXOL 50 ML: 240 INJECTION, SOLUTION INTRATHECAL; INTRAVASCULAR; INTRAVENOUS; ORAL at 09:26

## 2018-11-08 ENCOUNTER — OFFICE VISIT (OUTPATIENT)
Dept: PULMONOLOGY | Facility: HOSPICE | Age: 58
End: 2018-11-08
Payer: COMMERCIAL

## 2018-11-08 ENCOUNTER — NON-PROVIDER VISIT (OUTPATIENT)
Dept: PULMONOLOGY | Facility: HOSPICE | Age: 58
End: 2018-11-08
Payer: COMMERCIAL

## 2018-11-08 VITALS
BODY MASS INDEX: 27.15 KG/M2 | DIASTOLIC BLOOD PRESSURE: 82 MMHG | HEART RATE: 76 BPM | SYSTOLIC BLOOD PRESSURE: 120 MMHG | WEIGHT: 173 LBS | HEIGHT: 67 IN | OXYGEN SATURATION: 99 % | TEMPERATURE: 97.2 F | RESPIRATION RATE: 16 BRPM

## 2018-11-08 DIAGNOSIS — J45.40 MODERATE PERSISTENT ASTHMA WITHOUT COMPLICATION: ICD-10-CM

## 2018-11-08 DIAGNOSIS — Z78.9 NONSMOKER: ICD-10-CM

## 2018-11-08 DIAGNOSIS — J45.909 ASTHMA WITHOUT STATUS ASTHMATICUS WITHOUT COMPLICATION, UNSPECIFIED ASTHMA SEVERITY, UNSPECIFIED WHETHER PERSISTENT: ICD-10-CM

## 2018-11-08 DIAGNOSIS — R91.8 PULMONARY NODULES: ICD-10-CM

## 2018-11-08 DIAGNOSIS — C49.A2 GASTROINTESTINAL STROMAL TUMOR OF STOMACH (HCC): ICD-10-CM

## 2018-11-08 PROCEDURE — 94060 EVALUATION OF WHEEZING: CPT | Performed by: INTERNAL MEDICINE

## 2018-11-08 PROCEDURE — 99214 OFFICE O/P EST MOD 30 MIN: CPT | Mod: 25 | Performed by: INTERNAL MEDICINE

## 2018-11-08 ASSESSMENT — PULMONARY FUNCTION TESTS
FEV1/FVC_PERCENT_PREDICTED: 70
FVC_PERCENT_PREDICTED: 85
FEV1/FVC_PERCENT_PREDICTED: 67
FEV1/FVC: 51
FEV1_PREDICTED: 57
FEV1_PERCENT_PREDICTED: 56
FEV1_PERCENT_CHANGE: -5
FEV1_PREDICTED: 3.3
FVC_PREDICTED: 4.34
FEV1/FVC: 53.14
FEV1/FVC_PREDICTED: 76.04
FVC: 3.69
FVC: 3.5
FEV1: 1.89
FEV1_PERCENT_CHANGE: -1
FVC_PERCENT_PREDICTED: 80
FEV1: 1.86
FEV1/FVC_PERCENT_CHANGE: 20

## 2018-11-08 NOTE — PROGRESS NOTES
"Chief Complaint   Patient presents with   • Results     SPBA results       HPI: This patient is a pleasant 57 y.o. Male who returns for asthma and pulmonary nodules.  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  He had a CT chest/abdomen on May 14, 2018 which was personally reviewed and showed clustered nodules in the left upper lobe, with bilateral apical pleural scarring, and multiple calcified nodules, the latter consistent with granulomas. He has had asthma since the age of 3, and has been on Advair 250/50 for over 20 years. Growing up he had multiple episodes of \"pneumonia\".  He uses Advair 250/50 and started Tudorza inhaler last visit.  He is a lifelong non-smoker. Asthma triggers include exercise and \"extreme cold\".  Asthma symptoms include wheezing and chest tightness. Pulmonary function testing showed moderately severe obstructive airways with FEV1 1.66 L or 50%, FEV1/FVC :50% predicted.    Since starting Tudorza, his spirometry has improved with FEV1 1.89 L or 57%.  He had a follow-up chest CAT scan on November 5, 2018 which showed stable pulmonary micronodules, with a new 4 mm right apical nodule.  No consolidation appreciated.  His QuantiFERON TB Gold, cocci antibodies and Aspergillus antibodies are negative.  Denies fevers, chills, night sweats.  He had significant weight loss following his surgery, however denies any further weight loss in the past year.     Past Medical History:   Diagnosis Date   • Asthma without status asthmaticus 5/22/2018   • Bronchitis    • Cancer (HCC)    • Chickenpox    • Gastrointestinal stromal tumor of stomach (HCC) 4/4/2017 March 2017 18 cm GIST tumor removed Dr. Jin, Dr. Isabel (surgeon)    • GERD (gastroesophageal reflux disease)    • Mumps    • Pneumonia        Social History     Social History   • Marital status:      Spouse name: N/A   • Number of children: N/A   • Years of " education: N/A     Occupational History   • Not on file.     Social History Main Topics   • Smoking status: Never Smoker   • Smokeless tobacco: Never Used   • Alcohol use No      Comment: not while on gleevec    • Drug use: No   • Sexual activity: Not on file     Other Topics Concern   • Not on file     Social History Narrative    ; owns own business        Family History   Problem Relation Age of Onset   • Hypertension Mother    • Other Mother         glaucoma    • Cancer Father         pancreatic    • Hypertension Brother        Current Outpatient Prescriptions on File Prior to Visit   Medication Sig Dispense Refill   • vitamin D, Ergocalciferol, (DRISDOL) 29493 units Cap capsule Take 1 Cap by mouth every 7 days. 12 Cap 0   • Aclidinium Bromide (TUDORZA PRESSAIR) 400 MCG/ACT AEROSOL POWDER, BREATH ACTIVATED Inhale 1 Inhalation by mouth 2 Times a Day. 1 Each 5   • imatinib (GLEEVEC) 400 MG tablet imatinib 400 mg tablet     • sildenafil citrate (VIAGRA) 100 MG tablet Viagra 100 mg tablet   TAKE 1 TABLET BY MOUTH BEFORE SEXUAL ACTIVITY     • albuterol (VENTOLIN HFA) 108 (90 Base) MCG/ACT Aero Soln inhalation aerosol Inhale 1 Puff by mouth every 6 hours as needed for Shortness of Breath. 3 Inhaler 3   • acyclovir (ZOVIRAX) 400 MG tablet Take 2 Tabs by mouth 5 Times a Day. (Patient not taking: Reported on 11/8/2018) 100 Tab 1   • calcium acetate-aluminum sulfate (DOMEBORO) Pack Apply 1 Packet to affected area(s) 3 times a day. (Patient not taking: Reported on 11/8/2018) 3 Packet 1   • Fluticasone-Umeclidin-Vilant (TRELEGY ELLIPTA) 100-62.5-25 MCG/INH AEROSOL POWDER, BREATH ACTIVATED Inhale 1 Puff by mouth every day. (Patient not taking: Reported on 11/8/2018) 1 Each 6   • albuterol (PROVENTIL) 2.5mg/3ml Nebu Soln solution for nebulization albuterol sulfate 2.5 mg/3 mL (0.083 %) solution for nebulization       No current facility-administered medications on file prior to visit.   "      Allergies: Penicillins and Succinylcholine    ROS:   Constitutional: Denies fevers, chills, night sweats, fatigue or weight loss  Eyes: Denies vision loss, pain, drainage, double vision  Ears, Nose, Throat: Denies earache, difficulty hearing, tinnitus, nasal congestion, hoarseness  Cardiovascular: Denies chest pain, tightness, palpitations, orthopnea or edema  Respiratory: As in HPI  Sleep: Denies daytime sleepiness, snoring, apneas, insomnia, morning headaches  GI: Denies heartburn, dysphagia, nausea, abdominal pain, diarrhea or constipation  : Denies frequent urination, hematuria, discharge or painful urination  Musculoskeletal: Denies back pain, painful joints, sore muscles  Neurological: Denies weakness or headaches  Skin: +rash    Blood pressure 120/82, pulse 76, temperature 36.2 °C (97.2 °F), temperature source Temporal, resp. rate 16, height 1.702 m (5' 7\"), weight 78.5 kg (173 lb), SpO2 99 %.    Physical Exam:  Appearance: Well-nourished, well-developed, in no acute distress  HEENT: Normocephalic, atraumatic, white sclera, PERRLA, oropharynx clear  Neck: No adenopathy or masses  Respiratory: no intercostal retractions or accessory muscle use  Lungs auscultation: Clear to auscultation bilaterally  Cardiovascular: Regular rate rhythm. No murmurs, rubs or gallops.  No LE edema  Abdomen: soft, nondistended  Gait: Normal  Digits: No clubbing, cyanosis  Motor: No focal deficits  Orientation: Oriented to time, person and place    Diagnosis:  1. Pulmonary nodules  CT-CHEST (THORAX) W/O   2. Asthma without status asthmaticus without complication, unspecified asthma severity, unspecified whether persistent  fluticasone-salmeterol (ADVAIR DISKUS) 250-50 MCG/DOSE AEROSOL POWDER, BREATH ACTIVATED    Spirometry   3. Gastrointestinal stromal tumor of stomach (HCC)     4. Nonsmoker         Plan:  The patient has pulmonary micronodules, some calcified consistent with benign granulomas.  The remaining noncalcified " nodules are felt postinflammatory.  His QuantiFERON TB Gold test is negative.  Recommend follow-up with chest imaging in 6 months.  His asthma has improved with addition of inhaled anticholinergics.  Spirometry has improved.  Continue Advair 250/50 and Tudorza.  Return in about 6 months (around 5/8/2019) for after CT scan, with spirometry.

## 2019-01-25 ENCOUNTER — HOSPITAL ENCOUNTER (OUTPATIENT)
Dept: LAB | Facility: MEDICAL CENTER | Age: 59
End: 2019-01-25
Attending: INTERNAL MEDICINE
Payer: COMMERCIAL

## 2019-01-25 LAB
ALBUMIN SERPL BCP-MCNC: 4.1 G/DL (ref 3.2–4.9)
ALBUMIN/GLOB SERPL: 1.6 G/DL
ALP SERPL-CCNC: 47 U/L (ref 30–99)
ALT SERPL-CCNC: 15 U/L (ref 2–50)
ANION GAP SERPL CALC-SCNC: 6 MMOL/L (ref 0–11.9)
AST SERPL-CCNC: 18 U/L (ref 12–45)
BASOPHILS # BLD AUTO: 0 % (ref 0–1.8)
BASOPHILS # BLD: 0 K/UL (ref 0–0.12)
BILIRUB SERPL-MCNC: 0.5 MG/DL (ref 0.1–1.5)
BUN SERPL-MCNC: 11 MG/DL (ref 8–22)
CALCIUM SERPL-MCNC: 8.9 MG/DL (ref 8.5–10.5)
CHLORIDE SERPL-SCNC: 106 MMOL/L (ref 96–112)
CO2 SERPL-SCNC: 28 MMOL/L (ref 20–33)
CREAT SERPL-MCNC: 0.86 MG/DL (ref 0.5–1.4)
EOSINOPHIL # BLD AUTO: 0.17 K/UL (ref 0–0.51)
EOSINOPHIL NFR BLD: 4.7 % (ref 0–6.9)
ERYTHROCYTE [DISTWIDTH] IN BLOOD BY AUTOMATED COUNT: 45.7 FL (ref 35.9–50)
FASTING STATUS PATIENT QL REPORTED: NORMAL
GLOBULIN SER CALC-MCNC: 2.6 G/DL (ref 1.9–3.5)
GLUCOSE SERPL-MCNC: 98 MG/DL (ref 65–99)
HCT VFR BLD AUTO: 38 % (ref 42–52)
HGB BLD-MCNC: 12.9 G/DL (ref 14–18)
IMM GRANULOCYTES # BLD AUTO: 0.01 K/UL (ref 0–0.11)
IMM GRANULOCYTES NFR BLD AUTO: 0.3 % (ref 0–0.9)
LYMPHOCYTES # BLD AUTO: 0.94 K/UL (ref 1–4.8)
LYMPHOCYTES NFR BLD: 25.8 % (ref 22–41)
MCH RBC QN AUTO: 32.8 PG (ref 27–33)
MCHC RBC AUTO-ENTMCNC: 33.9 G/DL (ref 33.7–35.3)
MCV RBC AUTO: 96.7 FL (ref 81.4–97.8)
MONOCYTES # BLD AUTO: 0.25 K/UL (ref 0–0.85)
MONOCYTES NFR BLD AUTO: 6.8 % (ref 0–13.4)
NEUTROPHILS # BLD AUTO: 2.28 K/UL (ref 1.82–7.42)
NEUTROPHILS NFR BLD: 62.4 % (ref 44–72)
NRBC # BLD AUTO: 0 K/UL
NRBC BLD-RTO: 0 /100 WBC
PLATELET # BLD AUTO: 176 K/UL (ref 164–446)
PMV BLD AUTO: 10.5 FL (ref 9–12.9)
POTASSIUM SERPL-SCNC: 3.7 MMOL/L (ref 3.6–5.5)
PROT SERPL-MCNC: 6.7 G/DL (ref 6–8.2)
RBC # BLD AUTO: 3.93 M/UL (ref 4.7–6.1)
SODIUM SERPL-SCNC: 140 MMOL/L (ref 135–145)
WBC # BLD AUTO: 3.7 K/UL (ref 4.8–10.8)

## 2019-01-25 PROCEDURE — 36415 COLL VENOUS BLD VENIPUNCTURE: CPT

## 2019-01-25 PROCEDURE — 80053 COMPREHEN METABOLIC PANEL: CPT

## 2019-01-25 PROCEDURE — 85025 COMPLETE CBC W/AUTO DIFF WBC: CPT

## 2019-05-22 ENCOUNTER — HOSPITAL ENCOUNTER (OUTPATIENT)
Dept: RADIOLOGY | Facility: MEDICAL CENTER | Age: 59
End: 2019-05-22
Attending: INTERNAL MEDICINE
Payer: COMMERCIAL

## 2019-05-22 DIAGNOSIS — C49.A2 MALIGNANT GASTROINTESTINAL STROMAL TUMOR OF STOMACH (HCC): ICD-10-CM

## 2019-05-22 DIAGNOSIS — C49.A9 GASTROINTESTINAL STROMAL TUMOR OF OTHER SITES (HCC): ICD-10-CM

## 2019-05-22 PROCEDURE — 700117 HCHG RX CONTRAST REV CODE 255: Performed by: INTERNAL MEDICINE

## 2019-05-22 PROCEDURE — 71260 CT THORAX DX C+: CPT

## 2019-05-22 RX ADMIN — IOHEXOL 25 ML: 240 INJECTION, SOLUTION INTRATHECAL; INTRAVASCULAR; INTRAVENOUS; ORAL at 10:00

## 2019-05-22 RX ADMIN — IOHEXOL 100 ML: 350 INJECTION, SOLUTION INTRAVENOUS at 10:00

## 2019-05-28 ENCOUNTER — HOSPITAL ENCOUNTER (OUTPATIENT)
Dept: LAB | Facility: MEDICAL CENTER | Age: 59
End: 2019-05-28
Attending: INTERNAL MEDICINE
Payer: COMMERCIAL

## 2019-05-28 LAB
BASOPHILS # BLD AUTO: 0.2 % (ref 0–1.8)
BASOPHILS # BLD: 0.01 K/UL (ref 0–0.12)
EOSINOPHIL # BLD AUTO: 0.3 K/UL (ref 0–0.51)
EOSINOPHIL NFR BLD: 7 % (ref 0–6.9)
ERYTHROCYTE [DISTWIDTH] IN BLOOD BY AUTOMATED COUNT: 49.2 FL (ref 35.9–50)
HCT VFR BLD AUTO: 44.2 % (ref 42–52)
HGB BLD-MCNC: 14.6 G/DL (ref 14–18)
IMM GRANULOCYTES # BLD AUTO: 0.01 K/UL (ref 0–0.11)
IMM GRANULOCYTES NFR BLD AUTO: 0.2 % (ref 0–0.9)
LYMPHOCYTES # BLD AUTO: 0.88 K/UL (ref 1–4.8)
LYMPHOCYTES NFR BLD: 20.6 % (ref 22–41)
MCH RBC QN AUTO: 31.6 PG (ref 27–33)
MCHC RBC AUTO-ENTMCNC: 33 G/DL (ref 33.7–35.3)
MCV RBC AUTO: 95.7 FL (ref 81.4–97.8)
MONOCYTES # BLD AUTO: 0.33 K/UL (ref 0–0.85)
MONOCYTES NFR BLD AUTO: 7.7 % (ref 0–13.4)
NEUTROPHILS # BLD AUTO: 2.74 K/UL (ref 1.82–7.42)
NEUTROPHILS NFR BLD: 64.3 % (ref 44–72)
NRBC # BLD AUTO: 0 K/UL
NRBC BLD-RTO: 0 /100 WBC
PLATELET # BLD AUTO: 192 K/UL (ref 164–446)
PMV BLD AUTO: 10.3 FL (ref 9–12.9)
RBC # BLD AUTO: 4.62 M/UL (ref 4.7–6.1)
WBC # BLD AUTO: 4.3 K/UL (ref 4.8–10.8)

## 2019-05-28 PROCEDURE — 36415 COLL VENOUS BLD VENIPUNCTURE: CPT

## 2019-05-28 PROCEDURE — 80053 COMPREHEN METABOLIC PANEL: CPT

## 2019-05-28 PROCEDURE — 85025 COMPLETE CBC W/AUTO DIFF WBC: CPT

## 2019-05-29 LAB
ALBUMIN SERPL BCP-MCNC: 4.3 G/DL (ref 3.2–4.9)
ALBUMIN/GLOB SERPL: 2 G/DL
ALP SERPL-CCNC: 64 U/L (ref 30–99)
ALT SERPL-CCNC: 19 U/L (ref 2–50)
ANION GAP SERPL CALC-SCNC: 8 MMOL/L (ref 0–11.9)
AST SERPL-CCNC: 16 U/L (ref 12–45)
BILIRUB SERPL-MCNC: 0.7 MG/DL (ref 0.1–1.5)
BUN SERPL-MCNC: 13 MG/DL (ref 8–22)
CALCIUM SERPL-MCNC: 8.9 MG/DL (ref 8.5–10.5)
CHLORIDE SERPL-SCNC: 108 MMOL/L (ref 96–112)
CO2 SERPL-SCNC: 24 MMOL/L (ref 20–33)
CREAT SERPL-MCNC: 0.96 MG/DL (ref 0.5–1.4)
FASTING STATUS PATIENT QL REPORTED: NORMAL
GLOBULIN SER CALC-MCNC: 2.2 G/DL (ref 1.9–3.5)
GLUCOSE SERPL-MCNC: 97 MG/DL (ref 65–99)
POTASSIUM SERPL-SCNC: 4.2 MMOL/L (ref 3.6–5.5)
PROT SERPL-MCNC: 6.5 G/DL (ref 6–8.2)
SODIUM SERPL-SCNC: 140 MMOL/L (ref 135–145)

## 2019-05-30 ENCOUNTER — OFFICE VISIT (OUTPATIENT)
Dept: PULMONOLOGY | Facility: HOSPICE | Age: 59
End: 2019-05-30
Payer: COMMERCIAL

## 2019-05-30 ENCOUNTER — NON-PROVIDER VISIT (OUTPATIENT)
Dept: PULMONOLOGY | Facility: HOSPICE | Age: 59
End: 2019-05-30
Payer: COMMERCIAL

## 2019-05-30 VITALS
DIASTOLIC BLOOD PRESSURE: 60 MMHG | RESPIRATION RATE: 16 BRPM | HEART RATE: 77 BPM | WEIGHT: 180 LBS | OXYGEN SATURATION: 96 % | BODY MASS INDEX: 28.25 KG/M2 | SYSTOLIC BLOOD PRESSURE: 122 MMHG | HEIGHT: 67 IN

## 2019-05-30 VITALS — BODY MASS INDEX: 28.19 KG/M2 | WEIGHT: 180 LBS

## 2019-05-30 DIAGNOSIS — Z78.9 NONSMOKER: ICD-10-CM

## 2019-05-30 DIAGNOSIS — J45.40 MODERATE PERSISTENT ASTHMA WITHOUT COMPLICATION: ICD-10-CM

## 2019-05-30 DIAGNOSIS — C49.A2 GASTROINTESTINAL STROMAL TUMOR OF STOMACH (HCC): ICD-10-CM

## 2019-05-30 DIAGNOSIS — J44.9 CHRONIC OBSTRUCTIVE PULMONARY DISEASE, UNSPECIFIED COPD TYPE (HCC): ICD-10-CM

## 2019-05-30 DIAGNOSIS — J45.909 ASTHMA WITHOUT STATUS ASTHMATICUS WITHOUT COMPLICATION, UNSPECIFIED ASTHMA SEVERITY, UNSPECIFIED WHETHER PERSISTENT: ICD-10-CM

## 2019-05-30 DIAGNOSIS — R91.8 PULMONARY NODULES: ICD-10-CM

## 2019-05-30 PROCEDURE — 99214 OFFICE O/P EST MOD 30 MIN: CPT | Mod: 25 | Performed by: INTERNAL MEDICINE

## 2019-05-30 PROCEDURE — 94060 EVALUATION OF WHEEZING: CPT | Performed by: INTERNAL MEDICINE

## 2019-05-30 RX ORDER — ALBUTEROL SULFATE 90 UG/1
1 AEROSOL, METERED RESPIRATORY (INHALATION) EVERY 6 HOURS PRN
Qty: 1 INHALER | Refills: 1 | Status: SHIPPED | OUTPATIENT
Start: 2019-05-30 | End: 2021-08-16 | Stop reason: SDUPTHER

## 2019-05-30 ASSESSMENT — PULMONARY FUNCTION TESTS
FVC_PERCENT_PREDICTED: 75
FEV1/FVC_PREDICTED: 76.16
FEV1: 1.56
FEV1_PERCENT_PREDICTED: 56
FEV1/FVC_PERCENT_PREDICTED: 63
FVC_PERCENT_PREDICTED: 78
FEV1/FVC: 54.57
FVC_PREDICTED: 4.32
FVC: 3.24
FEV1_PERCENT_CHANGE: 18
FEV1_PERCENT_CHANGE: 4
FEV1/FVC_PERCENT_PREDICTED: 72
FEV1_PREDICTED: 47
FEV1_PREDICTED: 3.29
FVC: 3.39
FEV1: 1.85
FEV1/FVC_PERCENT_CHANGE: 450
FEV1/FVC: 48

## 2019-05-30 NOTE — PROCEDURES
Technician: Skye Correa [facility]  Tech notes:  Good pt effort and cooperation. ATS standards met.  Ventolin HFA 2 puffs via spacer administered.    The FVC is 3.39 L or 78%, FEV1 is 1.85 L or 56%, FEV1/FVC: 55%.  Significant bronchodilator response.      Interpretation:  Spirometry shows moderately severe obstructive ventilatory defect, with significant bronchodilator response, consistent with asthma.

## 2019-05-30 NOTE — PROGRESS NOTES
"Chief Complaint   Patient presents with   • Follow-Up     Pulmonary nodules     HPI: This patient is a pleasant 89y.o. Male who returns for asthma and pulmonary nodules.  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  He had a CT chest/abdomen on May 14, 2018 which was personally reviewed and showed clustered nodules in the left upper lobe, with bilateral apical pleural scarring, and multiple calcified nodules, the latter consistent with granulomas. He has had asthma since childhood, on Advair 250/50 and Tudorza inhaler.  He is a lifelong non-smoker. Asthma triggers include exercise and \"extreme cold\". Pulmonary function testing showed improved FEV1 to 1.85 L or 56% consistent with moderately severe obstructive ventilatory defect.  He showed significant bronchodilator response.    He had a follow-up chest CAT scan on November 5, 2018 which showed stable pulmonary micronodules, with a new 4 mm right apical nodule.  No consolidation appreciated.  His QuantiFERON TB Gold, cocci antibodies and Aspergillus antibodies are negative.  Denies cough, wheezing, dyspnea, fevers, chills, night sweats. Denies RUBY use.  Chest/abdominal CAT scan May 22, 2019 showed interval decrease in the size of the right lung nodule, stable remaining nodules.  The abdominal CAT scan however identified a new mass adjacent to the posterior wall of the stomach.  He also had a sclerotic lesion in T8 vertebra.  He is scheduled to see Dr. Pugh next week to address these new findings.  He has abdominal pain and feels \"like I did when I was diagnosed with cancer\".    Past Medical History:   Diagnosis Date   • Asthma without status asthmaticus 5/22/2018   • Bronchitis    • Cancer (HCC)    • Chickenpox    • Gastrointestinal stromal tumor of stomach (HCC) 4/4/2017 March 2017 18 cm GIST tumor removed Dr. Jin, Dr. Isabel (surgeon)    • GERD (gastroesophageal reflux disease)    • Mumps  "   • Pneumonia        Social History     Social History   • Marital status:      Spouse name: N/A   • Number of children: N/A   • Years of education: N/A     Occupational History   • Not on file.     Social History Main Topics   • Smoking status: Never Smoker   • Smokeless tobacco: Never Used   • Alcohol use No      Comment: not while on gleevec    • Drug use: No   • Sexual activity: Not on file     Other Topics Concern   • Not on file     Social History Narrative    ; owns own business        Family History   Problem Relation Age of Onset   • Hypertension Mother    • Other Mother         glaucoma    • Cancer Father         pancreatic    • Hypertension Brother        Current Outpatient Prescriptions on File Prior to Visit   Medication Sig Dispense Refill   • fluticasone-salmeterol (ADVAIR DISKUS) 250-50 MCG/DOSE AEROSOL POWDER, BREATH ACTIVATED Inhale 1 Puff by mouth 2 times a day. 3 Inhaler 3   • Aclidinium Bromide (TUDORZA PRESSAIR) 400 MCG/ACT AEROSOL POWDER, BREATH ACTIVATED Inhale 1 Inhalation by mouth 2 Times a Day. 1 Each 5   • imatinib (GLEEVEC) 400 MG tablet imatinib 400 mg tablet     • acyclovir (ZOVIRAX) 400 MG tablet Take 2 Tabs by mouth 5 Times a Day. (Patient not taking: Reported on 11/8/2018) 100 Tab 1   • calcium acetate-aluminum sulfate (DOMEBORO) Pack Apply 1 Packet to affected area(s) 3 times a day. (Patient not taking: Reported on 11/8/2018) 3 Packet 1   • vitamin D, Ergocalciferol, (DRISDOL) 50639 units Cap capsule Take 1 Cap by mouth every 7 days. (Patient not taking: Reported on 5/30/2019) 12 Cap 0   • Fluticasone-Umeclidin-Vilant (TRELEGY ELLIPTA) 100-62.5-25 MCG/INH AEROSOL POWDER, BREATH ACTIVATED Inhale 1 Puff by mouth every day. (Patient not taking: Reported on 11/8/2018) 1 Each 6   • albuterol (PROVENTIL) 2.5mg/3ml Nebu Soln solution for nebulization albuterol sulfate 2.5 mg/3 mL (0.083 %) solution for nebulization     • sildenafil citrate (VIAGRA) 100 MG  "tablet Viagra 100 mg tablet   TAKE 1 TABLET BY MOUTH BEFORE SEXUAL ACTIVITY     • albuterol (VENTOLIN HFA) 108 (90 Base) MCG/ACT Aero Soln inhalation aerosol Inhale 1 Puff by mouth every 6 hours as needed for Shortness of Breath. 3 Inhaler 3     No current facility-administered medications on file prior to visit.        Allergies: Penicillins and Succinylcholine    ROS:   Constitutional: Denies fevers, chills, night sweats, fatigue or weight loss  Eyes: Denies vision loss, pain, drainage, double vision  Ears, Nose, Throat: Denies earache, difficulty hearing, tinnitus, nasal congestion, hoarseness  Cardiovascular: Denies chest pain, tightness, palpitations, orthopnea or edema  Respiratory: Denies shortness of breath, cough, wheezing, hemoptysis  Sleep: Denies daytime sleepiness, snoring, apneas, insomnia, morning headaches  GI: Denies heartburn, dysphagia, nausea, +abdominal pain, denies diarrhea or constipation  : Denies frequent urination, hematuria, discharge or painful urination  Musculoskeletal: Denies back pain, painful joints, sore muscles  Neurological: Denies weakness or headaches  Skin: No rashes    /60 (BP Location: Left arm, Patient Position: Sitting, BP Cuff Size: Adult)   Pulse 77   Resp 16   Ht 1.702 m (5' 7\")   Wt 81.6 kg (180 lb)   SpO2 96%     Physical Exam:  Appearance: Well-nourished, well-developed, in no acute distress  HEENT: Normocephalic, atraumatic, white sclera, PERRLA, oropharynx clear  Neck: No adenopathy or masses  Respiratory: no intercostal retractions or accessory muscle use  Lungs auscultation: Clear to auscultation bilaterally  Cardiovascular: Regular rate rhythm. No murmurs, rubs or gallops.  No LE edema  Abdomen: soft, nondistended  Gait: Normal  Digits: No clubbing, cyanosis  Motor: No focal deficits  Back: no pain to palpation  Orientation: Oriented to time, person and place    Diagnosis:  1. Pulmonary nodules  CT-CHEST (THORAX) W/O   2. Moderate persistent asthma " without complication  Spirometry   3. Nonsmoker     4. Gastrointestinal stromal tumor of stomach (HCC)         Plan:  Ruddy's asthma has been stable with improved spirometry since adding inhaled anticholinergics.  Continue Advair 250/50 and Tudorza inhalers.  Prescription refills provided.  His pulmonary nodules are also stable and consistent with postinflammatory lesions.  Continue surveillance with chest CAT scan without contrast in 6 months.  More concerning however is the finding of a mass in his stomach and ?vertebral met.  He has an appointment pending with Dr. Pugh, oncologist, next week to address.  Return in about 6 months (around 11/30/2019) for after CT scan, with spirometry.

## 2019-06-17 ENCOUNTER — HOSPITAL ENCOUNTER (OUTPATIENT)
Dept: RADIOLOGY | Facility: MEDICAL CENTER | Age: 59
End: 2019-06-17
Attending: INTERNAL MEDICINE
Payer: COMMERCIAL

## 2019-06-17 DIAGNOSIS — C49.A2 MALIGNANT GASTRIC STROMAL TUMOR (HCC): ICD-10-CM

## 2019-06-17 PROCEDURE — A9552 F18 FDG: HCPCS

## 2019-07-10 DIAGNOSIS — Z01.810 PRE-OPERATIVE CARDIOVASCULAR EXAMINATION: ICD-10-CM

## 2019-07-10 DIAGNOSIS — Z01.812 PRE-OPERATIVE LABORATORY EXAMINATION: ICD-10-CM

## 2019-07-10 LAB
BASOPHILS # BLD AUTO: 0.2 % (ref 0–1.8)
BASOPHILS # BLD: 0.01 K/UL (ref 0–0.12)
EKG IMPRESSION: NORMAL
EOSINOPHIL # BLD AUTO: 0.34 K/UL (ref 0–0.51)
EOSINOPHIL NFR BLD: 7.9 % (ref 0–6.9)
ERYTHROCYTE [DISTWIDTH] IN BLOOD BY AUTOMATED COUNT: 47.2 FL (ref 35.9–50)
HCT VFR BLD AUTO: 41.1 % (ref 42–52)
HGB BLD-MCNC: 13.7 G/DL (ref 14–18)
IMM GRANULOCYTES # BLD AUTO: 0.01 K/UL (ref 0–0.11)
IMM GRANULOCYTES NFR BLD AUTO: 0.2 % (ref 0–0.9)
LYMPHOCYTES # BLD AUTO: 1 K/UL (ref 1–4.8)
LYMPHOCYTES NFR BLD: 23.3 % (ref 22–41)
MCH RBC QN AUTO: 31.4 PG (ref 27–33)
MCHC RBC AUTO-ENTMCNC: 33.3 G/DL (ref 33.7–35.3)
MCV RBC AUTO: 94.3 FL (ref 81.4–97.8)
MONOCYTES # BLD AUTO: 0.32 K/UL (ref 0–0.85)
MONOCYTES NFR BLD AUTO: 7.4 % (ref 0–13.4)
NEUTROPHILS # BLD AUTO: 2.62 K/UL (ref 1.82–7.42)
NEUTROPHILS NFR BLD: 61 % (ref 44–72)
NRBC # BLD AUTO: 0 K/UL
NRBC BLD-RTO: 0 /100 WBC
PLATELET # BLD AUTO: 190 K/UL (ref 164–446)
PMV BLD AUTO: 10.6 FL (ref 9–12.9)
RBC # BLD AUTO: 4.36 M/UL (ref 4.7–6.1)
WBC # BLD AUTO: 4.3 K/UL (ref 4.8–10.8)

## 2019-07-10 PROCEDURE — 93005 ELECTROCARDIOGRAM TRACING: CPT

## 2019-07-10 PROCEDURE — 85025 COMPLETE CBC W/AUTO DIFF WBC: CPT

## 2019-07-10 PROCEDURE — 80048 BASIC METABOLIC PNL TOTAL CA: CPT

## 2019-07-10 PROCEDURE — 93010 ELECTROCARDIOGRAM REPORT: CPT | Performed by: INTERNAL MEDICINE

## 2019-07-10 PROCEDURE — 36415 COLL VENOUS BLD VENIPUNCTURE: CPT

## 2019-07-10 NOTE — OR NURSING
"Reviewed \"Preparing for your procedure\". Instructed to take resp. meds DOS and to bring inhaler.   "

## 2019-07-11 LAB
ANION GAP SERPL CALC-SCNC: 10 MMOL/L (ref 0–11.9)
BUN SERPL-MCNC: 14 MG/DL (ref 8–22)
CALCIUM SERPL-MCNC: 9.7 MG/DL (ref 8.5–10.5)
CHLORIDE SERPL-SCNC: 107 MMOL/L (ref 96–112)
CO2 SERPL-SCNC: 23 MMOL/L (ref 20–33)
CREAT SERPL-MCNC: 1.06 MG/DL (ref 0.5–1.4)
GLUCOSE SERPL-MCNC: 101 MG/DL (ref 65–99)
POTASSIUM SERPL-SCNC: 3.8 MMOL/L (ref 3.6–5.5)
SODIUM SERPL-SCNC: 140 MMOL/L (ref 135–145)

## 2019-07-16 ENCOUNTER — ANESTHESIA EVENT (OUTPATIENT)
Dept: SURGERY | Facility: MEDICAL CENTER | Age: 59
End: 2019-07-16
Payer: COMMERCIAL

## 2019-07-16 ENCOUNTER — ANESTHESIA (OUTPATIENT)
Dept: SURGERY | Facility: MEDICAL CENTER | Age: 59
End: 2019-07-16
Payer: COMMERCIAL

## 2019-07-16 ENCOUNTER — HOSPITAL ENCOUNTER (OUTPATIENT)
Facility: MEDICAL CENTER | Age: 59
End: 2019-07-16
Attending: INTERNAL MEDICINE | Admitting: INTERNAL MEDICINE
Payer: COMMERCIAL

## 2019-07-16 VITALS
BODY MASS INDEX: 28.37 KG/M2 | SYSTOLIC BLOOD PRESSURE: 125 MMHG | TEMPERATURE: 97.5 F | RESPIRATION RATE: 16 BRPM | HEART RATE: 54 BPM | DIASTOLIC BLOOD PRESSURE: 65 MMHG | WEIGHT: 180.78 LBS | OXYGEN SATURATION: 99 % | HEIGHT: 67 IN

## 2019-07-16 LAB — PATHOLOGY CONSULT NOTE: NORMAL

## 2019-07-16 PROCEDURE — 700101 HCHG RX REV CODE 250: Performed by: ANESTHESIOLOGY

## 2019-07-16 PROCEDURE — 88341 IMHCHEM/IMCYTCHM EA ADD ANTB: CPT

## 2019-07-16 PROCEDURE — 160046 HCHG PACU - 1ST 60 MINS PHASE II: Performed by: INTERNAL MEDICINE

## 2019-07-16 PROCEDURE — 88307 TISSUE EXAM BY PATHOLOGIST: CPT

## 2019-07-16 PROCEDURE — 88172 CYTP DX EVAL FNA 1ST EA SITE: CPT

## 2019-07-16 PROCEDURE — 88341 IMHCHEM/IMCYTCHM EA ADD ANTB: CPT | Mod: 91

## 2019-07-16 PROCEDURE — 88323 CONSLTJ&REPRT MATRL PREP SLD: CPT

## 2019-07-16 PROCEDURE — 500066 HCHG BITE BLOCK, ECT: Performed by: INTERNAL MEDICINE

## 2019-07-16 PROCEDURE — 700111 HCHG RX REV CODE 636 W/ 250 OVERRIDE (IP)

## 2019-07-16 PROCEDURE — 160035 HCHG PACU - 1ST 60 MINS PHASE I: Performed by: INTERNAL MEDICINE

## 2019-07-16 PROCEDURE — 88342 IMHCHEM/IMCYTCHM 1ST ANTB: CPT

## 2019-07-16 PROCEDURE — 700105 HCHG RX REV CODE 258: Performed by: INTERNAL MEDICINE

## 2019-07-16 PROCEDURE — 160002 HCHG RECOVERY MINUTES (STAT): Performed by: INTERNAL MEDICINE

## 2019-07-16 PROCEDURE — 160025 RECOVERY II MINUTES (STATS): Performed by: INTERNAL MEDICINE

## 2019-07-16 PROCEDURE — 160208 HCHG ENDO MINUTES - EA ADDL 1 MIN LEVEL 4: Performed by: INTERNAL MEDICINE

## 2019-07-16 PROCEDURE — 88312 SPECIAL STAINS GROUP 1: CPT

## 2019-07-16 PROCEDURE — 160036 HCHG PACU - EA ADDL 30 MINS PHASE I: Performed by: INTERNAL MEDICINE

## 2019-07-16 PROCEDURE — 88177 CYTP FNA EVAL EA ADDL: CPT

## 2019-07-16 PROCEDURE — 88173 CYTOPATH EVAL FNA REPORT: CPT

## 2019-07-16 PROCEDURE — 160009 HCHG ANES TIME/MIN: Performed by: INTERNAL MEDICINE

## 2019-07-16 PROCEDURE — 160048 HCHG OR STATISTICAL LEVEL 1-5: Performed by: INTERNAL MEDICINE

## 2019-07-16 PROCEDURE — 700101 HCHG RX REV CODE 250: Performed by: INTERNAL MEDICINE

## 2019-07-16 PROCEDURE — 160203 HCHG ENDO MINUTES - 1ST 30 MINS LEVEL 4: Performed by: INTERNAL MEDICINE

## 2019-07-16 PROCEDURE — 88305 TISSUE EXAM BY PATHOLOGIST: CPT

## 2019-07-16 RX ORDER — HALOPERIDOL 5 MG/ML
1 INJECTION INTRAMUSCULAR
Status: DISCONTINUED | OUTPATIENT
Start: 2019-07-16 | End: 2019-07-16 | Stop reason: HOSPADM

## 2019-07-16 RX ORDER — MIDAZOLAM HYDROCHLORIDE 1 MG/ML
1 INJECTION INTRAMUSCULAR; INTRAVENOUS
Status: DISCONTINUED | OUTPATIENT
Start: 2019-07-16 | End: 2019-07-16 | Stop reason: HOSPADM

## 2019-07-16 RX ORDER — MEPERIDINE HYDROCHLORIDE 25 MG/ML
12.5 INJECTION INTRAMUSCULAR; INTRAVENOUS; SUBCUTANEOUS
Status: DISCONTINUED | OUTPATIENT
Start: 2019-07-16 | End: 2019-07-16 | Stop reason: HOSPADM

## 2019-07-16 RX ORDER — ONDANSETRON 2 MG/ML
4 INJECTION INTRAMUSCULAR; INTRAVENOUS
Status: DISCONTINUED | OUTPATIENT
Start: 2019-07-16 | End: 2019-07-16 | Stop reason: HOSPADM

## 2019-07-16 RX ORDER — SODIUM CHLORIDE, SODIUM LACTATE, POTASSIUM CHLORIDE, CALCIUM CHLORIDE 600; 310; 30; 20 MG/100ML; MG/100ML; MG/100ML; MG/100ML
INJECTION, SOLUTION INTRAVENOUS CONTINUOUS
Status: DISCONTINUED | OUTPATIENT
Start: 2019-07-16 | End: 2019-07-16 | Stop reason: HOSPADM

## 2019-07-16 RX ORDER — DIPHENHYDRAMINE HYDROCHLORIDE 50 MG/ML
12.5 INJECTION INTRAMUSCULAR; INTRAVENOUS
Status: DISCONTINUED | OUTPATIENT
Start: 2019-07-16 | End: 2019-07-16 | Stop reason: HOSPADM

## 2019-07-16 RX ORDER — HYDRALAZINE HYDROCHLORIDE 20 MG/ML
5 INJECTION INTRAMUSCULAR; INTRAVENOUS
Status: DISCONTINUED | OUTPATIENT
Start: 2019-07-16 | End: 2019-07-16 | Stop reason: HOSPADM

## 2019-07-16 RX ORDER — LIDOCAINE HYDROCHLORIDE 10 MG/ML
INJECTION, SOLUTION INFILTRATION; PERINEURAL
Status: DISCONTINUED
Start: 2019-07-16 | End: 2019-07-16 | Stop reason: HOSPADM

## 2019-07-16 RX ADMIN — PROPOFOL 30 MG: 10 INJECTION, EMULSION INTRAVENOUS at 09:02

## 2019-07-16 RX ADMIN — PROPOFOL 30 MG: 10 INJECTION, EMULSION INTRAVENOUS at 09:07

## 2019-07-16 RX ADMIN — PROPOFOL 30 MG: 10 INJECTION, EMULSION INTRAVENOUS at 09:16

## 2019-07-16 RX ADMIN — PROPOFOL 30 MG: 10 INJECTION, EMULSION INTRAVENOUS at 08:56

## 2019-07-16 RX ADMIN — PROPOFOL 30 MG: 10 INJECTION, EMULSION INTRAVENOUS at 09:12

## 2019-07-16 RX ADMIN — LIDOCAINE HYDROCHLORIDE 0.5 ML: 10 INJECTION, SOLUTION INFILTRATION; PERINEURAL at 08:30

## 2019-07-16 RX ADMIN — PROPOFOL 100 MG: 10 INJECTION, EMULSION INTRAVENOUS at 08:54

## 2019-07-16 RX ADMIN — FENTANYL CITRATE 50 MCG: 50 INJECTION INTRAMUSCULAR; INTRAVENOUS at 09:52

## 2019-07-16 RX ADMIN — PROPOFOL 20 MG: 10 INJECTION, EMULSION INTRAVENOUS at 08:58

## 2019-07-16 RX ADMIN — FENTANYL CITRATE 50 MCG: 50 INJECTION INTRAMUSCULAR; INTRAVENOUS at 10:20

## 2019-07-16 RX ADMIN — SODIUM CHLORIDE, POTASSIUM CHLORIDE, SODIUM LACTATE AND CALCIUM CHLORIDE: 600; 310; 30; 20 INJECTION, SOLUTION INTRAVENOUS at 08:51

## 2019-07-16 RX ADMIN — SODIUM CHLORIDE, POTASSIUM CHLORIDE, SODIUM LACTATE AND CALCIUM CHLORIDE: 600; 310; 30; 20 INJECTION, SOLUTION INTRAVENOUS at 08:30

## 2019-07-16 ASSESSMENT — PAIN SCALES - GENERAL: PAIN_LEVEL: 4

## 2019-07-16 NOTE — PROGRESS NOTES
Patient seen and examined prior to proceeding with EGD/EUS+/-FNA.  Risks, benefits, and alternatives of aforementioned procedures were discussed with patient and wife (Karen).  Consenting persons were given opportunities to ask questions and discuss other options.  Risks including but not limited to inaccurate staging, perforation, infection, bleeding, missed lesion(s), possible need for surgery and/or interventional radiology, possible need for repeat procedures and/or additional testing, hospitalization possibly prolonged, cardiac and/or pulmonary event, aspiration, hypoxia, stroke, medication and/or anesthesia reaction, indefinite diagnosis, discomfort, unsuccessful and/or incomplete procedure, ineffective therapy and/or persistent symptoms, damage to adjacent organs and/or vascular structures, and other adverse events possibly life-threatening.  Interactive discussion was undertaken with Layman's terms. I answered questions in full and to satisfaction.  Consenting persons stated understanding and acceptance of these risks, and wished to proceed.  Informed consent was given in clear state of mind.

## 2019-07-16 NOTE — PROGRESS NOTES
Immediately post-EGD/EUS-FNA patient experienced LUQ pain.  I instructed nurse to get a STAT abdominal x-ray but she did not order this.  Now, patient is patient free and doing well, stated that he burped and passed gas and felt much better and is asymptomatic now.  I examined him and his abdomen was flat, non-distended, not tympanic, without rebound and benign.  Thus, abdominal x-ray was not pursued.  I offered patient script for pain medication, he told me he did not need anything as he is feeling well.   I instructed him to call our office is with any problems or concerns and if his symptoms recur to go to ER immediately.  He stated understanding and acceptances of this responsibility.

## 2019-07-16 NOTE — PROCEDURES
Pre-procedure Diagnoses:   Personal history of malignant gastrointestinal stromal tumor (GIST) [Z85.09]   Abnormal abdominal CT scan [R93.5]   Monique-gastric intra-abdominal mass [R19.07]   Abdominal mass, RUQ (right upper quadrant) [R19.01]     Post-procedure Diagnoses:   Antral gastritis [K29.50]   History of partial gastrectomy [Z90.3]     Procedures:   UPPER ENDOSCOPIC ULTRASOUND GUIDED FINE-NEEDLE ASPIRATION BIOPSIES OF MONIQUE-GASTRIC MASS [85595 (CPT®)]   ESOPHAGOGASTRODUODENOSCOPY OR UPPER GI ENDOSCOPY WITH BIOPSIES [88403 (CPT®)]     Endoscopist: Zhou Miller MD, [facility], [facility] [place]    Monitored anesthesia care: Dr. Peter Mabry    Consent: Patient seen and examined prior to proceeding with EGD/EUS+/-FNA. Risks, benefits, and alternatives of aforementioned procedures were discussed with patient and wife (Lavelle). Consenting persons were given opportunities to ask questions and discuss other options. Risks including but not limited to inaccurate staging, perforation, infection, bleeding, missed lesion(s), possible need for surgery and/or interventional radiology, possible need for repeat procedures and/or additional testing, hospitalization possibly prolonged, cardiac and/or pulmonary event, aspiration, hypoxia, stroke, medication and/or anesthesia reaction, indefinite diagnosis, discomfort, unsuccessful and/or incomplete procedure, ineffective therapy and/or persistent symptoms, damage to adjacent organs and/or vascular structures, and other adverse events possibly life-threatening. Interactive discussion was undertaken with Layman's terms. I answered questions in full and to satisfaction. Consenting persons stated understanding and acceptance of these risks, and wished to proceed. Informed consent was given in clear state of mind.    Endoscopic procedures in detail: Diagnostic endoscope was inserted from mouth into second portion of the duodenum, retroflexion was performed in the stomach.  Gastric biopsies were  obtained to evaluate H.pylori status.  There was suction of insufflated air and stomach fluid contents upon removal.      Curvilinear echoendoscope was inserted from mouth to second portion of the duodenum.  Pancreas was examined with identification of normal vascular landmarks including superior mesenteric artery, superior mesenteric vein, portal vein, celiac artery, portal vein confluence, and splenorenal junction.  Biliary duct was imaged and traced from intrapancreatic portion to hepatic hilum.  Celiac axis was imaged to look for echogenic lymph nodes.    Fine-needle aspiration biopsies were obtained via a trans-gastric approach from jocelyne-gastric mass (proximal stomach posterior wall) after Doppler studies had identified a vessel-free path for all needle biopsies.  A total of five (5) passes were obtained with a 19-gauge SharkCore needle, multiple cytology slides were created and core or sampling remnants were sent to pathology.    Procedure times:  - In-room 08:52  - Start 08:58  - Completed 09:21  - Out of room per nursing records    Esophagogastroduodenoscopy Findings:  - Esophagus: endoscopically unremarkable.   - Stomach: proximal partial gastrectomy without endoscopic evidence of intraluminal recurrence.  Mld antral non-erosive gastritis, biopsied.  - Duodenum: endoscopically unremarkable to 2nd portion    Endoscopic Ultrasound Findings:  - Jocelyne-gastric mass: 20.6x18.4mm jocelyne-gastric mass near tail of pancreas, hypoechoic and irregular worrisome for malignant recurrence of GIST, fine-needle aspirated.  - Pancreas: unremarkable without evidence of ductal dilation or chronic pancreatitis.  - Celiac axis: no echogenic lymph nodes.    Impression:  1. Jocelyne-gastric (proximal stomach, posterior wall near pancreatic tail) mass fine-needle aspiration biopsies obtained, worrisome for GIST recurrence.   2. Antral non-erosive gastritis, biopsied  3. Proximal proximal gastrectomy surgical status    Recommendations:    1.  Routine post-endoscopy anesthesia recovery care.  Discharge patient when he is awake, alert and comfortable, when recovery criteria are met.  Aspiration and fall precautions x 24 hours.   2. Follow-up with Oncologist Dr. Pugh and Dr. Huizar.  I plan to send patient and referring physician with final results and further recommendations.  3. I spoke to patient, wife and recovery nurse about impression, diagnosis and recommendations.

## 2019-07-16 NOTE — ANESTHESIA TIME REPORT
Anesthesia Start and Stop Event Times     Date Time Event    7/16/2019 0851 Anesthesia Start     0934 Anesthesia Stop        Responsible Staff  07/16/19    Name Role Begin End    Peter Romero M.D. Anesth 0851 0934        Preop Diagnosis (Free Text):  Pre-op Diagnosis     GIST MASS OF ABDOMINAL CAVITY STRUCTURE        Preop Diagnosis (Codes):  Diagnosis Information     Diagnosis Code(s): Mass of abdomen [R19.00]        Post op Diagnosis  Leiomyoma      Premium Reason  Non-Premium    Comments:

## 2019-07-16 NOTE — DISCHARGE INSTRUCTIONS
ENDOSCOPY HOME CARE INSTRUCTIONS    GASTROSCOPY OR ERCP  1. Don't eat or drink anything for about an hour after the test. You can then resume your regular diet.  2. Don't drive or drink alcohol for 24 hours. The medication you received will make you too drowsy.  3. Don't take any coffee, tea, or aspirin products until after you see your doctor. These can harm the lining of your stomach.  4. If you begin to vomit bloody material, or develop black or bloody stools, call your doctor as soon as possible.  5. If you have any neck, chest, abdominal pain or temp of 100 degrees, call your doctor.      No alcohol or sleeping pills today.   Avoid aspirin, NSAIDs (e.g. Ibuprofen, naprosyn, etc.) and fish oil for 5 days.   Patient not to drive, operate heavy machinery or make important decisions for 24 hours.   I will send a pathology to confirm final diagnosis.       Results to convey to patient: jocelyne-gastric proximal stomach posterior wall mass near tail of pancreas biopsies likely GIST recurrence.        You should call 911 if you develop problems with breathing or chest pain.  If any questions arise, call your doctor. If your doctor is not available, please feel free to call [phone]. You can also call the HEALTH HOTLINE open 24 hours/day, 7 days/week and speak to a nurse at [phone], or toll free [phone].      Depression / Suicide Risk    As you are discharged from this [facility] facility, it is important to learn how to keep safe from harming yourself.    Recognize the warning signs:  · Abrupt changes in personality, positive or negative- including increase in energy   · Giving away possessions  · Change in eating patterns- significant weight changes-  positive or negative  · Change in sleeping patterns- unable to sleep or sleeping all the time   · Unwillingness or inability to communicate  · Depression  · Unusual sadness, discouragement and loneliness  · Talk of wanting to die  · Neglect of personal  appearance   · Rebelliousness- reckless behavior  · Withdrawal from people/activities they love  · Confusion- inability to concentrate     If you or a loved one observes any of these behaviors or has concerns about self-harm, here's what you can do:  · Talk about it- your feelings and reasons for harming yourself  · Remove any means that you might use to hurt yourself (examples: pills, rope, extension cords, firearm)  · Get professional help from the community (Mental Health, Substance Abuse, psychological counseling)  · Do not be alone:Call your Safe Contact- someone whom you trust who will be there for you.  · Call your local CRISIS HOTLINE 577-9967 or 579-341-1238  · Call your local Children's Mobile Crisis Response Team [place] (985) 605-8749 or www.Simplebooklet  · Call the toll free National Suicide Prevention Hotlines   · National Suicide Prevention Lifeline 836-403-IAIT (9657)  · National Hope Line Network 800-SUICIDE (570-6916)    I acknowledge receipt and understanding of these Home Care Instructions.

## 2019-07-16 NOTE — ANESTHESIA POSTPROCEDURE EVALUATION
Patient: Prashant Hewitt    Procedure Summary     Date:  07/16/19 Room / Location:   ENDOSCOPIC ULTRASOUND ROOM / SURGERY [facility]    Anesthesia Start:  0851 Anesthesia Stop:  0934    Procedures:       GASTROSCOPY      GASTROSCOPY, WITH BIOPSY      EGD, WITH ENDOSCOPIC US - UPPER CURVILINEAR      EGD, WITH ASPIRATION BIOPSY Diagnosis:       Mass of abdomen      (perigastric mass)    Surgeon:  Zhou Miller M.D. Responsible Provider:  Peter Romero M.D.    Anesthesia Type:  MAC ASA Status:  2          Final Anesthesia Type: MAC  Last vitals  BP   Blood Pressure: 134/86, NIBP: 125/76    Temp   36.2 °C (97.2 °F)    Pulse   Pulse: 67, Heart Rate (Monitored): 72   Resp   16    SpO2   100 %      Anesthesia Post Evaluation    Patient location during evaluation: PACU  Patient participation: complete - patient participated  Level of consciousness: awake and alert  Pain score: 4    Airway patency: patent  Anesthetic complications: no  Cardiovascular status: hemodynamically stable  Respiratory status: acceptable  Hydration status: euvolemic    PONV: none           Nurse Pain Score: 0 (NPRS)

## 2019-07-16 NOTE — OR NURSING
1115- Discharge instructions provided to patient and wife. Both verbalized understanding. Patient verbalize concern regarding patient's PACU care, specifically missed abdominal Xray. Wife verbalizes frustration regarding MD calling her on the phone to convey procedure findings instead of speaking with her personally. LAST implemented. Offered to contact MD and have him talk with them. Both decline. Offered to escalate concerns to management. Both agreeable.   1130- JOSE Tompkins manager at chairside.   1142- Patient D/Irving to care of family.

## 2019-07-29 ENCOUNTER — OFFICE VISIT (OUTPATIENT)
Dept: MEDICAL GROUP | Facility: PHYSICIAN GROUP | Age: 59
End: 2019-07-29
Payer: COMMERCIAL

## 2019-07-29 VITALS
HEART RATE: 83 BPM | BODY MASS INDEX: 28.56 KG/M2 | DIASTOLIC BLOOD PRESSURE: 82 MMHG | SYSTOLIC BLOOD PRESSURE: 118 MMHG | HEIGHT: 67 IN | RESPIRATION RATE: 16 BRPM | WEIGHT: 182 LBS | OXYGEN SATURATION: 96 % | TEMPERATURE: 98.3 F
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
## 2019-07-29 DIAGNOSIS — C49.A2 GASTROINTESTINAL STROMAL TUMOR OF STOMACH (HCC): ICD-10-CM

## 2019-07-29 PROCEDURE — 99214 OFFICE O/P EST MOD 30 MIN: CPT | Performed by: INTERNAL MEDICINE

## 2019-07-29 RX ORDER — DOXYCYCLINE HYCLATE 100 MG
TABLET ORAL
Qty: 2 TAB | Refills: 0 | Status: SHIPPED | OUTPATIENT
Start: 2019-07-29 | End: 2019-10-01

## 2019-07-30 NOTE — PROGRESS NOTES
PRIMARY CARE CLINIC FOLLOW UP VISIT  Chief Complaint   Patient presents with   • Bug Bite     Tick Bite      History of Present Illness     Tick bite  Just returned from [place]. He picked off a tick when he was in the woods in [place]. He was in [place] last Wednesday and did note that his wife pulled off a tick this morning. Denies cognitive issues, neurological deficits. Isn't sure if he has a true PCN allergy.     Gastrointestinal stromal tumor of stomach (HCC)  He is awaiting biopsy results since his oncologist is concerned about recurrence.     Current Outpatient Prescriptions   Medication Sig Dispense Refill   • doxycycline (VIBRAMYCIN) 100 MG Tab Take 2 tabs at once 2 Tab 0   • albuterol (VENTOLIN HFA) 108 (90 Base) MCG/ACT Aero Soln inhalation aerosol Inhale 1 Puff by mouth every 6 hours as needed for Shortness of Breath. 1 Inhaler 1   • Aclidinium Bromide (TUDORZA PRESSAIR) 400 MCG/ACT AEROSOL POWDER, BREATH ACTIVATED Inhale 1 Inhalation by mouth 2 Times a Day. 1 Each 5   • fluticasone-salmeterol (ADVAIR DISKUS) 250-50 MCG/DOSE AEROSOL POWDER, BREATH ACTIVATED Inhale 1 Puff by mouth 2 times a day. 3 Inhaler 3   • Fluticasone-Umeclidin-Vilant (TRELEGY ELLIPTA) 100-62.5-25 MCG/INH AEROSOL POWDER, BREATH ACTIVATED Inhale 1 Puff by mouth every day. (Patient not taking: Reported on 11/8/2018) 1 Each 6   • albuterol (PROVENTIL) 2.5mg/3ml Nebu Soln solution for nebulization albuterol sulfate 2.5 mg/3 mL (0.083 %) solution for nebulization     • imatinib (GLEEVEC) 400 MG tablet imatinib 400 mg tablet       No current facility-administered medications for this visit.      Past Medical History:   Diagnosis Date   • Asthma without status asthmaticus 5/22/2018   • Cancer (HCC) 2017    gist   • Chickenpox    • Gastrointestinal stromal tumor of stomach (HCC) 4/4/2017 March 2017 18 cm GIST tumor removed Dr. Jin, Dr. Isabel (surgeon)    • GERD (gastroesophageal reflux disease)    • Mumps    • Pneumonia     2017   •  "Snoring      Past Surgical History:   Procedure Laterality Date   • PB UPPER GI ENDOSCOPY,BIOPSY  2019    Procedure: GASTROSCOPY, WITH BIOPSY;  Surgeon: Zhou Miller M.D.;  Location: [facility];  Service: EUS   • GASTROSCOPY  2019    Procedure: GASTROSCOPY;  Surgeon: Zhou Miller M.D.;  Location: [facility];  Service: EUS   • EGD W/ENDOSCOPIC ULTRASOUND  2019    Procedure: EGD, WITH ENDOSCOPIC US - UPPER CURVILINEAR;  Surgeon: Zhou Miller M.D.;  Location: [facility];  Service: EUS   • EGD WITH ASP/BX  2019    Procedure: EGD, WITH ASPIRATION BIOPSY;  Surgeon: Zhou Miller M.D.;  Location: [facility];  Service: EUS   • CHOLECYSTECTOMY     • FINGER AMPUTATION Right     right index accident when 3 yo   • LAPAROTOMY     • OTHER      GIST removal 3/2017   • SEPTOPLASTY     • SINUSCOPE       Social History   Substance Use Topics   • Smoking status: Never Smoker   • Smokeless tobacco: Never Used   • Alcohol use Yes      Comment: 1-2 PER WEEK     Social History     Social History [employer]; owns own business      Family History   Problem Relation Age of Onset   • Hypertension Mother    • Other Mother         glaucoma    • Cancer Father         pancreatic    • Hypertension Brother      Family Status   Relation Status   • Mo Alive   • Fa  at age 80   • Sis Alive   • Bro Alive   • Sis Alive   • Sis Alive   • Sis Alive   • Son Alive   • Son Alive   • Sharon Alive     Allergies: Penicillins; Succinylcholine; and Epinephrine    ROS  As per HPI above. All other systems reviewed and negative.        Objective   /82   Pulse 83   Temp 36.8 °C (98.3 °F)   Resp 16   Ht 1.702 m (5' 7\")   Wt 82.6 kg (182 lb)   SpO2 96%  Body mass index is 28.51 kg/m².    General: alert and oriented, pleasant, cooperative  HEENT: Normocephalic, atraumatic.    Skin: circular erythema/inflammation of back on right thoracic " area   Psychiatric: appropriate mood and affect. Good insight and appropriate judgment     Assessment and Plan   The following treatment plan was discussed     1. Tick bite, initial encounter  He has confirmation of tick attachment > 36 hours, was in an endemic area, doesn't have intolerance to doxycycline, and has presented well within 72 hours of tick removal. Given doxycycline prophylaxis dosing.   - doxycycline (VIBRAMYCIN) 100 MG Tab; Take 2 tabs at once  Dispense: 2 Tab; Refill: 0    2. Gastrointestinal stromal tumor of stomach (HCC)  Following with his oncologist Dr. Pugh, awaiting biopsy results.     Healthcare maintenance     Health Maintenance Due   Topic Date Due   • HEPATITIS C SCREENING  1960   • COLONOSCOPY  11/30/2010     No Follow-up on file.    Clif Tejeda MD  Internal Medicine  [facility]

## 2019-09-12 NOTE — PROGRESS NOTES
Subjective:   9/16/2019  7:27 AM  Primary care physician:Clif Tejeda M.D.  Medical Oncologist: Valentin Pugh MD    Chief Complaint:   Chief Complaint   Patient presents with   • Follow-Up     FU CT/EUS GIST      Diagnosis:   1. Gastrointestinal stromal tumor (GIST) of stomach (HCC)     2. Lung nodules         History of presenting illness:  Prashant Hewitt  is a pleasant 58 y.o. year old male who presented with surveillance imaging for a large gastrointestinal stromal cell tumor that I had resected back several years ago.  The patient has been on Gleevec after resection and initially was managed by Dr. Jin and now by 1 of his partners.  The patient denies any fever or chills, nausea or vomiting.  He says he has intermittent abdominal pain but no weight loss.  He actually has been on Gleevec for over 2 years and doing well with it.  He has been undergoing surveillance imaging and the most recent imaging from July appears to be similar to his imaging prior to that.  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  It has been stable in size.  There is also a questional hypodense lesion in the right lobe of the liver which is less than 5 mm.  It does not appear to be growing nor does it appear to behave aggressively.  Other than that the patient is done well.  I have not seen the patient in over a year because he was following up with his medical oncologist Dr. Pugh.  The patient denies any upper or lower GI bleeds.  He denies any symptoms of obstruction.  The pain he has is intermittent and is unclear what the source is.  The CT scan from September 13, 2019 I personally reviewed as well as the CT scan from June 17, 2019 with the PET scan and May 22, 2019.  The PET scan was negative as well for recurrence.  He was referred in to see me to discuss possible recurrence.      Past Medical  "History:   Diagnosis Date   • Asthma without status asthmaticus 5/22/2018   • Cancer (HCC) 2017    gist   • Chickenpox    • Gastrointestinal stromal tumor of stomach (HCC) 4/4/2017 March 2017 18 cm GIST tumor removed Dr. Jin, Dr. Isabel (surgeon)    • GERD (gastroesophageal reflux disease)    • Mumps    • Pneumonia     2017   • Snoring      Past Surgical History:   Procedure Laterality Date   • PB UPPER GI ENDOSCOPY,BIOPSY  7/16/2019    Procedure: GASTROSCOPY, WITH BIOPSY;  Surgeon: Zhou Miller M.D.;  Location: [facility];  Service: EUS   • GASTROSCOPY  7/16/2019    Procedure: GASTROSCOPY;  Surgeon: Zhou Miller M.D.;  Location: [facility];  Service: EUS   • EGD W/ENDOSCOPIC ULTRASOUND  7/16/2019    Procedure: EGD, WITH ENDOSCOPIC US - UPPER CURVILINEAR;  Surgeon: Zhou Miller M.D.;  Location: [facility];  Service: EUS   • EGD WITH ASP/BX  7/16/2019    Procedure: EGD, WITH ASPIRATION BIOPSY;  Surgeon: Zhou Miller M.D.;  Location: [facility];  Service: EUS   • CHOLECYSTECTOMY     • FINGER AMPUTATION Right     right index accident when 5 yo   • LAPAROTOMY     • OTHER      GIST removal 3/2017   • SEPTOPLASTY     • SINUSCOPE       Allergies   Allergen Reactions   • Penicillins Rash     Rash as [age] year old   • Succinylcholine Unspecified     \"weakness and ill\", stopped breathing   • Epinephrine Unspecified     Highly sensitive.      Outpatient Encounter Medications as of 9/16/2019   Medication Sig Dispense Refill   • doxycycline (VIBRAMYCIN) 100 MG Tab Take 2 tabs at once 2 Tab 0   • albuterol (VENTOLIN HFA) 108 (90 Base) MCG/ACT Aero Soln inhalation aerosol Inhale 1 Puff by mouth every 6 hours as needed for Shortness of Breath. 1 Inhaler 1   • Aclidinium Bromide (TUDORZA PRESSAIR) 400 MCG/ACT AEROSOL POWDER, BREATH ACTIVATED Inhale 1 Inhalation by mouth 2 Times a Day. 1 Each 5   • fluticasone-salmeterol (ADVAIR DISKUS) 250-50 MCG/DOSE AEROSOL " POWDER, BREATH ACTIVATED Inhale 1 Puff by mouth 2 times a day. 3 Inhaler 3   • Fluticasone-Umeclidin-Vilant (TRELEGY ELLIPTA) 100-62.5-25 MCG/INH AEROSOL POWDER, BREATH ACTIVATED Inhale 1 Puff by mouth every day. (Patient not taking: Reported on 11/8/2018) 1 Each 6   • albuterol (PROVENTIL) 2.5mg/3ml Nebu Soln solution for nebulization albuterol sulfate 2.5 mg/3 mL (0.083 %) solution for nebulization     • imatinib (GLEEVEC) 400 MG tablet imatinib 400 mg tablet       No facility-administered encounter medications on file as of 9/16/2019.      Social History     Socioeconomic History   • Marital status:      Spouse name: Not on file   • Number of children: Not on file   • Years of education: Not on file   • Highest education level: Not on file   Occupational History   • Not on file   Social Needs   • Financial resource strain: Not on file   • Food insecurity:     Worry: Not on file     Inability: Not on file   • Transportation needs:     Medical: Not on file     Non-medical: Not on file   Tobacco Use   • Smoking status: Never Smoker   • Smokeless tobacco: Never Used   Substance and Sexual Activity   • Alcohol use: Yes     Comment: 1-2 PER WEEK   • Drug use: No   • Sexual activity: Not on file   Lifestyle   • Physical activity:     Days per week: Not on file     Minutes per session: Not on file   • Stress: Not on file   Relationships   • Social connections:     Talks on phone: Not on file     Gets together: Not on file     Attends Judaism service: Not on file     Active member of club or organization: Not on file     Attends meetings of clubs or organizations: Not on file     Relationship status: Not on file   • Intimate partner violence:     Fear of current or ex partner: Not on file     Emotionally abused: Not on file     Physically abused: Not on file     Forced sexual activity: Not on file   Other Topics Concern   • Not on file   Social History Narrative    ; owns own business   "     Social History     Tobacco Use   Smoking Status Never Smoker   Smokeless Tobacco Never Used     Social History     Substance and Sexual Activity   Alcohol Use Yes    Comment: 1-2 PER WEEK     Social History     Substance and Sexual Activity   Drug Use No        Family History   Problem Relation Age of Onset   • Hypertension Mother    • Other Mother         glaucoma    • Cancer Father         pancreatic    • Hypertension Brother        Review of Systems   Gastrointestinal: Positive for abdominal pain.   All other systems reviewed and are negative.       Objective:   /62 (BP Location: Left arm, Patient Position: Sitting, BP Cuff Size: Adult)   Pulse 64   Temp 36.5 °C (97.7 °F) (Temporal)   Ht 1.702 m (5' 7\")   Wt 82.7 kg (182 lb 5.1 oz)   SpO2 96%   BMI 28.56 kg/m²     Physical Exam   Constitutional: He is oriented to person, place, and time. He appears well-developed and well-nourished.   HENT:   Head: Normocephalic and atraumatic.   Eyes: Pupils are equal, round, and reactive to light. Conjunctivae are normal.   Neck: Normal range of motion. Neck supple.   Cardiovascular: Normal rate and regular rhythm.   Pulmonary/Chest: Effort normal and breath sounds normal.   Abdominal: Soft. Bowel sounds are normal.   Well-healed midline scar   Musculoskeletal: Normal range of motion.   Neurological: He is alert and oriented to person, place, and time.   Skin: Skin is warm and dry.   Psychiatric: He has a normal mood and affect. His behavior is normal. Judgment and thought content normal.   Nursing note and vitals reviewed.      Labs:  Results for JUNE PACE (MRN [id number]) as of 9/12/2019 12:48   Ref. Range 7/10/2019 13:23   WBC Latest Ref Range: 4.8 - 10.8 K/uL 4.3 (L)   RBC Latest Ref Range: 4.70 - 6.10 M/uL 4.36 (L)   Hemoglobin Latest Ref Range: 14.0 - 18.0 g/dL 13.7 (L)   Hematocrit Latest Ref Range: 42.0 - 52.0 % 41.1 (L)   MCV Latest Ref Range: 81.4 - 97.8 fL 94.3   MCH Latest Ref Range: 27.0 - 33.0 " pg 31.4   MCHC Latest Ref Range: 33.7 - 35.3 g/dL 33.3 (L)   RDW Latest Ref Range: 35.9 - 50.0 fL 47.2   Platelet Count Latest Ref Range: 164 - 446 K/uL 190   MPV Latest Ref Range: 9.0 - 12.9 fL 10.6   Neutrophils-Polys Latest Ref Range: 44.00 - 72.00 % 61.00   Neutrophils (Absolute) Latest Ref Range: 1.82 - 7.42 K/uL 2.62   Lymphocytes Latest Ref Range: 22.00 - 41.00 % 23.30   Lymphs (Absolute) Latest Ref Range: 1.00 - 4.80 K/uL 1.00   Monocytes Latest Ref Range: 0.00 - 13.40 % 7.40   Monos (Absolute) Latest Ref Range: 0.00 - 0.85 K/uL 0.32   Eosinophils Latest Ref Range: 0.00 - 6.90 % 7.90 (H)   Eos (Absolute) Latest Ref Range: 0.00 - 0.51 K/uL 0.34   Basophils Latest Ref Range: 0.00 - 1.80 % 0.20   Baso (Absolute) Latest Ref Range: 0.00 - 0.12 K/uL 0.01   Immature Granulocytes Latest Ref Range: 0.00 - 0.90 % 0.20   Immature Granulocytes (abs) Latest Ref Range: 0.00 - 0.11 K/uL 0.01   Nucleated RBC Latest Units: /100 WBC 0.00   NRBC (Absolute) Latest Units: K/uL 0.00   Sodium Latest Ref Range: 135 - 145 mmol/L 140   Potassium Latest Ref Range: 3.6 - 5.5 mmol/L 3.8   Chloride Latest Ref Range: 96 - 112 mmol/L 107   Co2 Latest Ref Range: 20 - 33 mmol/L 23   Anion Gap Latest Ref Range: 0.0 - 11.9  10.0   Glucose Latest Ref Range: 65 - 99 mg/dL 101 (H)   Bun Latest Ref Range: 8 - 22 mg/dL 14   Creatinine Latest Ref Range: 0.50 - 1.40 mg/dL 1.06   GFR If  Latest Ref Range: >60 mL/min/1.73 m 2 >60   GFR If Non  Latest Ref Range: >60 mL/min/1.73 m 2 >60   Calcium Latest Ref Range: 8.5 - 10.5 mg/dL 9.7       Imaging: CT 9/13/19    Per my read,     Impression       2.1 x 1.7 cm mass along the posterior proximal stomach is stable compared to the prior examination.    Tiny hypodense hepatic lesion is too small to characterize and stable.    Atherosclerotic plaque.    Postsurgical changes in the stomach are again noted.    Colonic diverticulosis.    Patchy and nodular opacities in the  "peripheral anterior left lower lobe are slightly increased compared to the prior examination.    Other ill-defined opacities bilaterally are unchanged. Stable tiny upper lobe pulmonary nodules.    Sequelae of prior granulomatous exposure.    T8 sclerotic lesion is again noted. No new sclerotic lesions are identified.    Demineralization.    Wall thickening of the distal esophagus can be seen in esophagitis.         Pathology: 7/16/19    FINAL DIAGNOSIS:    CONSULTANTS' DIAGNOSIS:    \"PERIGASTRIC MASS, CORE BIOPSY ([id number]; 07/16/2019)    - BLAND SPINDLE CELL LESION, MOST CONSISTENT WITH DESMOID      FIBROMATOSIS (SEE COMMENT)\"    Please see separate [facility] report for further  details. Dr. Pablo reviewed the slides of this case prior to  requesting consultative opinion by [facility].    [place] DIAGNOSIS:    A. Gastric antrum biopsy:         Small fragments of benign gastric mucosa demonstrate mild          chronic nonspecific inflammation and reactive changes.         Warthin-Starry stain negative for H. pylori organisms.         No intestinal metaplasia or malignancy identified.  B. Slides/remnants, perigastric mass:         Predominantly blood and few scattered aggregates of degenerated          cells.         No morphologic evidence of gastrointestinal stromal tumor          identified.         No unequivocal malignant cells identified.  C. Core, perigastric mass:         Please see [employer] expert consultant's diagnosis          above.    Procedures: 7/16/19    UPPER ENDOSCOPIC ULTRASOUND GUIDED FINE-NEEDLE ASPIRATION BIOPSIES OF SEB-GASTRIC MASS    ESOPHAGOGASTRODUODENOSCOPY OR UPPER GI ENDOSCOPY WITH BIOPSIES          Diagnosis:     1. Gastrointestinal stromal tumor (GIST) of stomach (HCC)     2. Lung nodules             Medical Decision Making:  Today's Assessment / Status / Plan:     In light of the present findings, the patient's pathology from " July shows that this is a dermoid fibrosis and not a recurrent-intestinal stromal cell tumor.  Also the liver lesion is so small its difficult to assess what it is and it was there on the last set of imaging.  I do not see any sign of recurrent disease and no need for exploration or resection.  My recommendation is to continue with surveillance and if the tumor in the stomach or liver start to grow then we would proceed with an endoscopic ultrasound or IR biopsy.  The patient appears to be stable on Gleevec and is doing well functionally.  I did request that the patient do follow-up with me after his next set of imaging and continue to follow-up with me during his surveillance with Dr. Berry.  He agreed to the above plan and we will see him in approximate 5 to 6 months when his next set of imaging occurs.    He agreed and verbalized his agreement and understanding with the current plan. I answered all questions and concerns he has at this time and advised him to call at any time in the interim with questions or concerns in regards to his care.    Thank you for allowing me to participate in his care, I will continue to follow closely.       Please note that this dictation was created using voice recognition software. I have made every reasonable attempt to correct obvious errors, but I expect that there are errors of grammar and possibly content that I did not discover before finalizing the note.     Thank you for this consultation and allowing me to participate in your patient's care. If I can be of further service please contact my office.

## 2019-09-13 ENCOUNTER — HOSPITAL ENCOUNTER (OUTPATIENT)
Dept: RADIOLOGY | Facility: MEDICAL CENTER | Age: 59
End: 2019-09-13
Attending: INTERNAL MEDICINE
Payer: COMMERCIAL

## 2019-09-13 DIAGNOSIS — C49.A9 GASTROINTESTINAL STROMAL TUMOR OF OTHER SITES (HCC): ICD-10-CM

## 2019-09-13 PROCEDURE — 71260 CT THORAX DX C+: CPT

## 2019-09-13 PROCEDURE — 700117 HCHG RX CONTRAST REV CODE 255: Performed by: INTERNAL MEDICINE

## 2019-09-13 RX ADMIN — IOHEXOL 100 ML: 350 INJECTION, SOLUTION INTRAVENOUS at 15:30

## 2019-09-13 RX ADMIN — IOHEXOL 25 ML: 240 INJECTION, SOLUTION INTRATHECAL; INTRAVASCULAR; INTRAVENOUS; ORAL at 15:30

## 2019-09-16 ENCOUNTER — OFFICE VISIT (OUTPATIENT)
Dept: SURGERY | Facility: MEDICAL CENTER | Age: 59
End: 2019-09-16
Payer: COMMERCIAL

## 2019-09-16 VITALS
SYSTOLIC BLOOD PRESSURE: 118 MMHG | HEIGHT: 67 IN | WEIGHT: 182.32 LBS | BODY MASS INDEX: 28.62 KG/M2 | TEMPERATURE: 97.7 F | DIASTOLIC BLOOD PRESSURE: 62 MMHG | HEART RATE: 64 BPM | OXYGEN SATURATION: 96 %

## 2019-09-16 DIAGNOSIS — C49.A2 GASTROINTESTINAL STROMAL TUMOR (GIST) OF STOMACH (HCC): ICD-10-CM

## 2019-09-16 DIAGNOSIS — R91.8 LUNG NODULES: ICD-10-CM

## 2019-09-16 PROCEDURE — 99215 OFFICE O/P EST HI 40 MIN: CPT | Performed by: SURGERY

## 2019-09-16 ASSESSMENT — ENCOUNTER SYMPTOMS: ABDOMINAL PAIN: 1

## 2019-10-01 ENCOUNTER — OFFICE VISIT (OUTPATIENT)
Dept: MEDICAL GROUP | Facility: PHYSICIAN GROUP | Age: 59
End: 2019-10-01
Payer: COMMERCIAL

## 2019-10-01 VITALS
SYSTOLIC BLOOD PRESSURE: 128 MMHG | BODY MASS INDEX: 28.85 KG/M2 | HEIGHT: 67 IN | DIASTOLIC BLOOD PRESSURE: 64 MMHG | HEART RATE: 72 BPM | OXYGEN SATURATION: 96 % | TEMPERATURE: 98.2 F | WEIGHT: 183.8 LBS

## 2019-10-01 DIAGNOSIS — J45.909 ASTHMA WITHOUT STATUS ASTHMATICUS WITHOUT COMPLICATION, UNSPECIFIED ASTHMA SEVERITY, UNSPECIFIED WHETHER PERSISTENT: ICD-10-CM

## 2019-10-01 DIAGNOSIS — R91.8 MULTIPLE NODULES OF LUNG: ICD-10-CM

## 2019-10-01 DIAGNOSIS — C49.A2 GASTROINTESTINAL STROMAL TUMOR OF STOMACH (HCC): ICD-10-CM
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
## 2019-10-01 PROCEDURE — 99214 OFFICE O/P EST MOD 30 MIN: CPT | Performed by: FAMILY MEDICINE

## 2019-10-01 ASSESSMENT — PATIENT HEALTH QUESTIONNAIRE - PHQ9: CLINICAL INTERPRETATION OF PHQ2 SCORE: 0

## 2019-10-01 NOTE — PROGRESS NOTES
CC: GIST tumor    HISTORY OF THE PRESENT ILLNESS: Patient is a 58 y.o. male. This pleasant patient is here today to establish care and discuss health problems below.    Asthma: Patient has exercise-induced and respiratory illness induced asthma.  He takes Tudorza and Advair.  He does follow with a pulmonologist due to lung nodules as well.  They have been monitoring with periodic chest CT.  He reports that his asthma is well controlled and is without symptoms today.    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  He takes Gleevec daily and does report that he has a lot of gastrointestinal side effects from this medication including abdominal pain, diarrhea and constipation on a regular basis.  Overall he feels quite well.  He is eating normally.  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  He does express concern that it may be related to his cancer in spite of the reassurance that has been provided to him through his cancer doctors.    Allergies: Penicillins; Succinylcholine; and Epinephrine    Current Outpatient Medications Ordered in Epic   Medication Sig Dispense Refill   • albuterol (VENTOLIN HFA) 108 (90 Base) MCG/ACT Aero Soln inhalation aerosol Inhale 1 Puff by mouth every 6 hours as needed for Shortness of Breath. 1 Inhaler 1   • Aclidinium Bromide (TUDORZA PRESSAIR) 400 MCG/ACT AEROSOL POWDER, BREATH ACTIVATED Inhale 1 Inhalation by mouth 2 Times a Day. 1 Each 5   • fluticasone-salmeterol (ADVAIR DISKUS) 250-50 MCG/DOSE AEROSOL POWDER, BREATH ACTIVATED Inhale 1 Puff by mouth 2 times a day. 3 Inhaler 3   • albuterol (PROVENTIL) 2.5mg/3ml Nebu Soln solution for nebulization albuterol sulfate 2.5 mg/3 mL (0.083 %) solution for nebulization     • imatinib (GLEEVEC) 400 MG tablet imatinib 400 mg tablet       No current Epic-ordered  facility-administered medications on file.        Past Medical History:   Diagnosis Date   • Asthma without status asthmaticus 5/22/2018   • Cancer (HCC) 2017    gist   • Chickenpox    • Gastrointestinal stromal tumor of stomach (HCC) 4/4/2017 March 2017 18 cm GIST tumor removed Dr. Jin, Dr. Isabel (surgeon)    • GERD (gastroesophageal reflux disease)    • Mumps    • Pneumonia     2017   • Snoring        Past Surgical History:   Procedure Laterality Date   • PB UPPER GI ENDOSCOPY,BIOPSY  7/16/2019    Procedure: GASTROSCOPY, WITH BIOPSY;  Surgeon: Zhou Miller M.D.;  Location: [facility];  Service: EUS   • GASTROSCOPY  7/16/2019    Procedure: GASTROSCOPY;  Surgeon: Zhou Miller M.D.;  Location: [facility];  Service: EUS   • EGD W/ENDOSCOPIC ULTRASOUND  7/16/2019    Procedure: EGD, WITH ENDOSCOPIC US - UPPER CURVILINEAR;  Surgeon: Zhou Miller M.D.;  Location: [facility];  Service: EUS   • EGD WITH ASP/BX  7/16/2019    Procedure: EGD, WITH ASPIRATION BIOPSY;  Surgeon: Zhou Miller M.D.;  Location: [facility];  Service: EUS   • CHOLECYSTECTOMY     • FINGER AMPUTATION Right     right index accident when 5 yo   • LAPAROTOMY     • OTHER      GIST removal 3/2017   • SEPTOPLASTY     • SINUSCOPE         Social History     Tobacco Use   • Smoking status: Never Smoker   • Smokeless tobacco: Never Used   Substance Use Topics   • Alcohol use: Yes     Comment: 1-2 PER WEEK   • Drug use: No       Social History     Social History Narrative    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     - Respiratory: Negative for cough, sputum  "production, chest congestion, dyspnea, wheezing, and crackles.      - Cardiovascular: Negative for chest pain, palpitations, orthopnea, PND, and bilateral lower extremity edema.     - Gastrointestinal:See HPI.      - Genitourinary: Negative for dysuria, polyuria, hematuria, pyuria, urinary urgency, and urinary incontinence.       Labs: Labs reviewed from July 10, 2019.    Exam: /64 (BP Location: Left arm, Patient Position: Sitting, BP Cuff Size: Adult)   Pulse 72   Temp 36.8 °C (98.2 °F) (Temporal)   Ht 1.702 m (5' 7\")   Wt 83.4 kg (183 lb 12.8 oz)   SpO2 96%  Body mass index is 28.79 kg/m².    General: Well appearing, NAD  HEENT: Normocephalic. Conjunctiva clear, lids without ptosis, pupils equal and reactive to light accommodation, ears normal shape and contour, canals are clear bilaterally, tympanic membranes without bulging or erythema and normal cone of light, oropharynx is without erythema, edema or exudates.   Neck: Supple without JVD. No thyromegaly or nodules  Pulmonary: Clear to ausculation.  Normal effort. No rales, ronchi, or wheezing.  Cardiovascular: Regular rate and rhythm without murmur, rubs or gallop.   Abdomen: Soft, nontender, nondistended. Normal bowel sounds. Liver and spleen are not palpable. No rebound or guarding  Neurologic: normal gait  Lymph: No cervical, supraclavicular lymph nodes are palpable  Skin: Warm and dry.  No obvious lesions.  Musculoskeletal:  No extremity cyanosis, clubbing, or edema.  Psych: Normal mood and affect. Alert and oriented. Judgment and insight is normal.    Please note that this dictation was created using voice recognition software. I have made every reasonable attempt to correct obvious errors, but I expect that there are errors of grammar and possibly content that I did not discover before finalizing the note.      Assessment/Plan  Prashant was seen today for establish care and annual exam.    Diagnoses and all orders for this visit:    Asthma without " status asthmaticus without complication, unspecified asthma severity, unspecified whether persistent  Chronic issue for patient, new issue to me.  Stable on Tudorza and Advair.  Follows with pulmonology.    Gastrointestinal stromal tumor of stomach (HCC)  Previous issue for patient, new issue to me.  On Gleevec.  Follows closely with Dr. Pugh in oncology as well as gastroenterology and his surgeon.  No concerns at this time.    Multiple nodules of lung  Chronic issue for patient, new issue to me.  Currently on q. six-month chest CTs.  Followed up by pulmonology.  Due for repeat chest CT in about a month or two.     Follow-up in 1 year sooner if needed.    Sarah Doyle,   [facility]

## 2019-11-25 NOTE — TELEPHONE ENCOUNTER
----- Message from Prashant Hewitt sent at 11/25/2019 12:37 PM PST -----  Regarding: Prescription Question  Contact: [phone]  I have had a prescription for Viagra in the past... It was too expensive so I didn't use it much and didn't get a refill. I see that it is now available in a generic form. Could you please write a new prescription for the generic for me now?  Thank you  Ruddy

## 2019-11-25 NOTE — TELEPHONE ENCOUNTER
Please refer to [facility] message.       Was the patient seen in the last year in this department? No     Does patient have an active prescription for medications requested? No     Received Request Via: Patient

## 2019-11-27 RX ORDER — SILDENAFIL 100 MG/1
50 TABLET, FILM COATED ORAL
Qty: 30 TAB | Refills: 2 | Status: SHIPPED | OUTPATIENT
Start: 2019-11-27

## 2019-12-05 ENCOUNTER — NON-PROVIDER VISIT (OUTPATIENT)
Dept: PULMONOLOGY | Facility: HOSPICE | Age: 59
End: 2019-12-05
Payer: COMMERCIAL

## 2019-12-05 ENCOUNTER — OFFICE VISIT (OUTPATIENT)
Dept: PULMONOLOGY | Facility: HOSPICE | Age: 59
End: 2019-12-05
Payer: COMMERCIAL

## 2019-12-05 VITALS
BODY MASS INDEX: 28.82 KG/M2 | SYSTOLIC BLOOD PRESSURE: 122 MMHG | DIASTOLIC BLOOD PRESSURE: 80 MMHG | HEART RATE: 69 BPM | HEIGHT: 67 IN | BODY MASS INDEX: 28.88 KG/M2 | WEIGHT: 184 LBS | OXYGEN SATURATION: 97 % | RESPIRATION RATE: 16 BRPM | WEIGHT: 184 LBS

## 2019-12-05 DIAGNOSIS — J45.40 MODERATE PERSISTENT ASTHMA WITHOUT COMPLICATION: ICD-10-CM

## 2019-12-05 DIAGNOSIS — C49.A2 GASTROINTESTINAL STROMAL TUMOR OF STOMACH (HCC): ICD-10-CM

## 2019-12-05 DIAGNOSIS — Z23 NEED FOR VACCINATION: ICD-10-CM

## 2019-12-05 PROCEDURE — 90471 IMMUNIZATION ADMIN: CPT | Performed by: INTERNAL MEDICINE

## 2019-12-05 PROCEDURE — 99214 OFFICE O/P EST MOD 30 MIN: CPT | Mod: 25 | Performed by: INTERNAL MEDICINE

## 2019-12-05 PROCEDURE — 90732 PPSV23 VACC 2 YRS+ SUBQ/IM: CPT | Performed by: INTERNAL MEDICINE

## 2019-12-05 PROCEDURE — 94060 EVALUATION OF WHEEZING: CPT | Performed by: INTERNAL MEDICINE

## 2019-12-05 ASSESSMENT — PULMONARY FUNCTION TESTS
FVC: 3.28
FVC_PREDICTED: 4.31
FEV1/FVC_PREDICTED: 75.87
FEV1_PERCENT_PREDICTED: 50
FVC_PERCENT_PREDICTED: 76
FEV1_PREDICTED: 52
FEV1_PREDICTED: 3.27
FEV1: 1.71
FEV1/FVC: 50.61
FVC: 3.31
FEV1_PERCENT_CHANGE: -2
FEV1/FVC_PERCENT_PREDICTED: 68
FEV1: 1.66
FEV1/FVC: 52
FVC_PERCENT_PREDICTED: 76
FEV1_PERCENT_CHANGE: 0
FEV1/FVC_PERCENT_PREDICTED: 66

## 2019-12-05 NOTE — PROCEDURES
Technician: Julio Cesar Coleman notes:  Good patient effort and cooperation. ATS standards met for reproducibility and acceptability. Predicted set is N-HanesIII.  A bronchodilator of Ventolin HFA 2 puffs via spacer was administered.    The FVC is 3.28 L or 76%, FEV1 is 1.66 L or 50%, FEV1/FVC: 51%.  No significant reversibility noted.    Interpretation:  Spirometry shows moderately severe obstructive ventilatory defect.

## 2019-12-05 NOTE — PROGRESS NOTES
"Chief Complaint   Patient presents with   • Follow-Up     Pulmonary nodules   • Results     CT: 209/13/19   • Results     Steven RE     HPI: This patient is a pleasant 59 y.o. Male who returns for asthma and pulmonary nodules.  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  He had a CT chest/abdomen on May 14, 2018 which showed clustered nodules in the left upper lobe, with bilateral apical pleural scarring, and multiple calcified nodules (benign). He has had asthma since childhood, on Advair 250/50 and Tudorza inhaler.  He is a lifelong non-smoker. Asthma triggers include exercise and \"extreme cold\". Pulmonary function testing today show decline in  FEV1 from 1.85 L or 56% to 1.66 L or 50%, consistent with moderately severe obstructive ventilatory defect.  He has noted worsening dyspnea with the colder air.  Denies cough, wheezing or chest tightness.  Declines influenza vaccine.  Follow-up chest CAT scan on November 5, 2018 showed stable pulmonary micronodules, with a new 4 mm right apical nodule.     Chest/abdominal CAT scan May 22, 2019 showed interval decrease in the size of the right lung nodule, stable remaining nodules.  PET/CT in June 2019 showed no abnormal FDG uptake.  Follow-up chest CAT scan September 13, 2019 showed stable upper lobe nodules, patchy nodules in the left lower lobe slightly increased.      Past Medical History:   Diagnosis Date   • Asthma without status asthmaticus 5/22/2018   • Cancer (HCC) 2017    gist   • Chickenpox    • Gastrointestinal stromal tumor of stomach (HCC) 4/4/2017 March 2017 18 cm GIST tumor removed Dr. Jin, Dr. Isabel (surgeon)    • GERD (gastroesophageal reflux disease)    • Mumps    • Pneumonia     2017   • Snoring        Social History     Socioeconomic History   • Marital status:      Spouse name: Not on file   • Number of children: Not on file   • Years of education: Not on file   • Highest " education level: Not on file   Occupational History   • Not on file   Social Needs   • Financial resource strain: Not on file   • Food insecurity:     Worry: Not on file     Inability: Not on file   • Transportation needs:     Medical: Not on file     Non-medical: Not on file   Tobacco Use   • Smoking status: Never Smoker   • Smokeless tobacco: Never Used   Substance and Sexual Activity   • Alcohol use: Yes     Comment: 1-2 PER WEEK   • Drug use: No   • Sexual activity: Not on file   Lifestyle   • Physical activity:     Days per week: Not on file     Minutes per session: Not on file   • Stress: Not on file   Relationships   • Social connections:     Talks on phone: Not on file     Gets together: Not on file     Attends [employer] service: Not on file     Active member of club or organization: Not on file     Attends meetings of clubs or organizations: Not on file     Relationship status: Not on file   • Intimate partner violence:     Fear of current or ex partner: Not on file     Emotionally abused: Not on file     Physically abused: Not on file     Forced sexual activity: Not on file   Other Topics Concern   • Not on file   Social History Narrative    ; owns own business        Family History   Problem Relation Age of Onset   • Hypertension Mother    • Other Mother         glaucoma    • Cancer Father         pancreatic    • Heart Disease Father    • Hypertension Brother        Current Outpatient Medications on File Prior to Visit   Medication Sig Dispense Refill   • sildenafil citrate (VIAGRA) 100 MG tablet Take 0.5 Tabs by mouth 1 time daily as needed for Erectile Dysfunction. 30 Tab 2   • albuterol (VENTOLIN HFA) 108 (90 Base) MCG/ACT Aero Soln inhalation aerosol Inhale 1 Puff by mouth every 6 hours as needed for Shortness of Breath. 1 Inhaler 1   • Aclidinium Bromide (TUDORZA PRESSAIR) 400 MCG/ACT AEROSOL POWDER, BREATH ACTIVATED Inhale 1 Inhalation by mouth 2 Times a Day. 1 Each 5   •  "fluticasone-salmeterol (ADVAIR DISKUS) 250-50 MCG/DOSE AEROSOL POWDER, BREATH ACTIVATED Inhale 1 Puff by mouth 2 times a day. 3 Inhaler 3   • albuterol (PROVENTIL) 2.5mg/3ml Nebu Soln solution for nebulization albuterol sulfate 2.5 mg/3 mL (0.083 %) solution for nebulization     • imatinib (GLEEVEC) 400 MG tablet imatinib 400 mg tablet       No current facility-administered medications on file prior to visit.        Allergies: Penicillins; Succinylcholine; and Epinephrine    ROS:   Constitutional: Denies fevers, chills, night sweats, fatigue or weight loss  Eyes: Denies vision loss, pain, drainage, double vision  Ears, Nose, Throat: Denies earache, difficulty hearing, tinnitus, nasal congestion, hoarseness  Cardiovascular: Denies chest pain, tightness, palpitations, orthopnea or edema  Respiratory: Denies shortness of breath, cough, wheezing, hemoptysis  Sleep: Denies daytime sleepiness, snoring, apneas, insomnia, morning headaches  GI: Denies heartburn, dysphagia, nausea, abdominal pain, diarrhea or constipation  : Denies frequent urination, hematuria, discharge or painful urination  Musculoskeletal: Denies back pain, painful joints, sore muscles  Neurological: Denies weakness or headaches  Skin: No rashes    /80 (BP Location: Left arm, Patient Position: Sitting, BP Cuff Size: Adult)   Pulse 69   Resp 16   Ht 1.702 m (5' 7\")   Wt 83.5 kg (184 lb)   SpO2 97%     Physical Exam:  Appearance: Well-nourished, well-developed, in no acute distress  HEENT: Normocephalic, atraumatic, white sclera, PERRLA, oropharynx clear  Neck: No adenopathy or masses  Respiratory: no intercostal retractions or accessory muscle use  Lungs auscultation: Clear to auscultation bilaterally  Cardiovascular: Regular rate rhythm. No murmurs, rubs or gallops.  No LE edema  Abdomen: soft, nondistended  Gait: Normal  Digits: No clubbing, cyanosis  Motor: No focal deficits  Orientation: Oriented to time, person and " place    Diagnosis:  1. Moderate persistent asthma without complication  Spirometry   2. Need for vaccination  Pneumococal Polysaccharide Vaccine 23-Valent =>1YO SQ/IM   3. Gastrointestinal stromal tumor of stomach (HCC)         Plan:  The patient's asthma has exacerbated mildly, with decline in spirometry noted.  Recommend step up Advair to 500/50 twice daily.    Continue Tudorza inhaler.  He declines influenza vaccine.    Pneumococcal vaccine provided.  His pulmonary nodules show no significant PET uptake and remain small and stable overall.  We will continue with surveillance with chest CAT scan within 6 months.  Return in about 4 months (around 4/5/2020).

## 2020-02-03 ENCOUNTER — HOSPITAL ENCOUNTER (OUTPATIENT)
Dept: RADIOLOGY | Facility: MEDICAL CENTER | Age: 60
End: 2020-02-03
Attending: INTERNAL MEDICINE
Payer: COMMERCIAL

## 2020-02-03 ENCOUNTER — HOSPITAL ENCOUNTER (OUTPATIENT)
Dept: LAB | Facility: MEDICAL CENTER | Age: 60
End: 2020-02-03
Attending: INTERNAL MEDICINE
Payer: COMMERCIAL

## 2020-02-03 DIAGNOSIS — C49.A2 MALIGNANT GASTRIC STROMAL TUMOR (HCC): ICD-10-CM

## 2020-02-03 LAB
ALBUMIN SERPL BCP-MCNC: 4.2 G/DL (ref 3.2–4.9)
ALBUMIN/GLOB SERPL: 1.6 G/DL
ALP SERPL-CCNC: 51 U/L (ref 30–99)
ALT SERPL-CCNC: 17 U/L (ref 2–50)
ANION GAP SERPL CALC-SCNC: 7 MMOL/L (ref 0–11.9)
AST SERPL-CCNC: 15 U/L (ref 12–45)
BASOPHILS # BLD AUTO: 0.2 % (ref 0–1.8)
BASOPHILS # BLD: 0.01 K/UL (ref 0–0.12)
BILIRUB SERPL-MCNC: 0.7 MG/DL (ref 0.1–1.5)
BUN SERPL-MCNC: 15 MG/DL (ref 8–22)
CALCIUM SERPL-MCNC: 9.3 MG/DL (ref 8.5–10.5)
CHLORIDE SERPL-SCNC: 105 MMOL/L (ref 96–112)
CO2 SERPL-SCNC: 27 MMOL/L (ref 20–33)
CREAT SERPL-MCNC: 0.97 MG/DL (ref 0.5–1.4)
EOSINOPHIL # BLD AUTO: 0.26 K/UL (ref 0–0.51)
EOSINOPHIL NFR BLD: 6.3 % (ref 0–6.9)
ERYTHROCYTE [DISTWIDTH] IN BLOOD BY AUTOMATED COUNT: 47.5 FL (ref 35.9–50)
FASTING STATUS PATIENT QL REPORTED: NORMAL
GLOBULIN SER CALC-MCNC: 2.6 G/DL (ref 1.9–3.5)
GLUCOSE SERPL-MCNC: 108 MG/DL (ref 65–99)
HCT VFR BLD AUTO: 41.9 % (ref 42–52)
HGB BLD-MCNC: 14.2 G/DL (ref 14–18)
IMM GRANULOCYTES # BLD AUTO: 0.01 K/UL (ref 0–0.11)
IMM GRANULOCYTES NFR BLD AUTO: 0.2 % (ref 0–0.9)
LYMPHOCYTES # BLD AUTO: 0.87 K/UL (ref 1–4.8)
LYMPHOCYTES NFR BLD: 20.9 % (ref 22–41)
MCH RBC QN AUTO: 32.5 PG (ref 27–33)
MCHC RBC AUTO-ENTMCNC: 33.9 G/DL (ref 33.7–35.3)
MCV RBC AUTO: 95.9 FL (ref 81.4–97.8)
MONOCYTES # BLD AUTO: 0.36 K/UL (ref 0–0.85)
MONOCYTES NFR BLD AUTO: 8.7 % (ref 0–13.4)
NEUTROPHILS # BLD AUTO: 2.65 K/UL (ref 1.82–7.42)
NEUTROPHILS NFR BLD: 63.7 % (ref 44–72)
NRBC # BLD AUTO: 0 K/UL
NRBC BLD-RTO: 0 /100 WBC
PLATELET # BLD AUTO: 186 K/UL (ref 164–446)
PMV BLD AUTO: 10.7 FL (ref 9–12.9)
POTASSIUM SERPL-SCNC: 3.8 MMOL/L (ref 3.6–5.5)
PROT SERPL-MCNC: 6.8 G/DL (ref 6–8.2)
RBC # BLD AUTO: 4.37 M/UL (ref 4.7–6.1)
SODIUM SERPL-SCNC: 139 MMOL/L (ref 135–145)
WBC # BLD AUTO: 4.2 K/UL (ref 4.8–10.8)

## 2020-02-03 PROCEDURE — 85025 COMPLETE CBC W/AUTO DIFF WBC: CPT

## 2020-02-03 PROCEDURE — 71260 CT THORAX DX C+: CPT

## 2020-02-03 PROCEDURE — 80053 COMPREHEN METABOLIC PANEL: CPT

## 2020-02-03 PROCEDURE — 36415 COLL VENOUS BLD VENIPUNCTURE: CPT

## 2020-02-03 PROCEDURE — 700117 HCHG RX CONTRAST REV CODE 255: Performed by: INTERNAL MEDICINE

## 2020-02-03 RX ADMIN — IOHEXOL 100 ML: 350 INJECTION, SOLUTION INTRAVENOUS at 10:30

## 2020-02-03 RX ADMIN — IOHEXOL 25 ML: 240 INJECTION, SOLUTION INTRATHECAL; INTRAVASCULAR; INTRAVENOUS; ORAL at 10:30

## 2020-03-25 ENCOUNTER — TELEPHONE (OUTPATIENT)
Dept: PULMONOLOGY | Facility: HOSPICE | Age: 60
End: 2020-03-25

## 2020-03-25 DIAGNOSIS — J45.909 UNCOMPLICATED ASTHMA, UNSPECIFIED ASTHMA SEVERITY, UNSPECIFIED WHETHER PERSISTENT: ICD-10-CM

## 2020-03-25 NOTE — TELEPHONE ENCOUNTER
MEDICATION PRIOR AUTHORIZATION NEEDED:    1. Name of Medication: Tudorza Pressair 400 mcg    2. Requested By (Name of Pharmacy): [facility]     3. Is insurance on file current? yes    4. What is the name & phone number of the 3rd party payor? [employer] [phone]

## 2020-03-25 NOTE — TELEPHONE ENCOUNTER
DOCUMENTATION OF PRIOR AUTH STATUS    1. Medication name and dose: Tudorza Pressair 400 mcg    2. Name and Phone # of Prescription coverage company: [employer] [phone]    3. Date Prior Auth was submitted: 3/20/2020    4. What information was given to obtain insurance decision: Clinical notes    5. Prior Auth letter Approved or Denied: Denied    6. Pharmacy notified: No    7. Patient notified: No      Tudorza Pressair 400 mcg was denied.  It is excluded from the patient's plan.  He has to use Incruse.  Dx is asthma.  Please advise, thank you.

## 2020-03-26 NOTE — TELEPHONE ENCOUNTER
Called the pt and let him know that Tudorza was denied by his insurance and that we sent Incruse to his pharmacy to try.

## 2020-06-26 ENCOUNTER — PATIENT MESSAGE (OUTPATIENT)
Dept: PULMONOLOGY | Facility: HOSPICE | Age: 60
End: 2020-06-26

## 2020-06-26 DIAGNOSIS — R91.8 LUNG NODULES: ICD-10-CM

## 2020-06-26 NOTE — TELEPHONE ENCOUNTER
From: Prashant Hewitt  To: Ines Easley M.D.  Sent: 6/26/2020 7:26 AM PDT  Subject: Non-Urgent Medical Question    I have a CT scheduled for Monday 29th by my oncologist for pelvis/abdomin , usually they add the chest ct for your review at the same .. saving me $450 imaging fee.  Can you contact them or CT group to get both done at the same time?

## 2020-06-29 ENCOUNTER — HOSPITAL ENCOUNTER (OUTPATIENT)
Dept: RADIOLOGY | Facility: MEDICAL CENTER | Age: 60
End: 2020-06-29
Attending: INTERNAL MEDICINE
Payer: COMMERCIAL

## 2020-06-29 ENCOUNTER — HOSPITAL ENCOUNTER (OUTPATIENT)
Dept: LAB | Facility: MEDICAL CENTER | Age: 60
End: 2020-06-29
Attending: INTERNAL MEDICINE
Payer: COMMERCIAL

## 2020-06-29 DIAGNOSIS — C49.A9 GASTROINTESTINAL STROMAL TUMOR OF OTHER SITES (HCC): ICD-10-CM

## 2020-06-29 LAB
ALBUMIN SERPL BCP-MCNC: 4.4 G/DL (ref 3.2–4.9)
ALBUMIN/GLOB SERPL: 1.8 G/DL
ALP SERPL-CCNC: 70 U/L (ref 30–99)
ALT SERPL-CCNC: 21 U/L (ref 2–50)
ANION GAP SERPL CALC-SCNC: 12 MMOL/L (ref 7–16)
AST SERPL-CCNC: 18 U/L (ref 12–45)
BASOPHILS # BLD AUTO: 0.4 % (ref 0–1.8)
BASOPHILS # BLD: 0.02 K/UL (ref 0–0.12)
BILIRUB SERPL-MCNC: 0.6 MG/DL (ref 0.1–1.5)
BUN SERPL-MCNC: 13 MG/DL (ref 8–22)
CALCIUM SERPL-MCNC: 9 MG/DL (ref 8.5–10.5)
CHLORIDE SERPL-SCNC: 103 MMOL/L (ref 96–112)
CO2 SERPL-SCNC: 24 MMOL/L (ref 20–33)
CREAT SERPL-MCNC: 0.82 MG/DL (ref 0.5–1.4)
EOSINOPHIL # BLD AUTO: 0.3 K/UL (ref 0–0.51)
EOSINOPHIL NFR BLD: 6.4 % (ref 0–6.9)
ERYTHROCYTE [DISTWIDTH] IN BLOOD BY AUTOMATED COUNT: 46.5 FL (ref 35.9–50)
FASTING STATUS PATIENT QL REPORTED: NORMAL
GLOBULIN SER CALC-MCNC: 2.5 G/DL (ref 1.9–3.5)
GLUCOSE SERPL-MCNC: 96 MG/DL (ref 65–99)
HCT VFR BLD AUTO: 42.7 % (ref 42–52)
HGB BLD-MCNC: 14.2 G/DL (ref 14–18)
IMM GRANULOCYTES # BLD AUTO: 0 K/UL (ref 0–0.11)
IMM GRANULOCYTES NFR BLD AUTO: 0 % (ref 0–0.9)
LYMPHOCYTES # BLD AUTO: 1.17 K/UL (ref 1–4.8)
LYMPHOCYTES NFR BLD: 24.9 % (ref 22–41)
MCH RBC QN AUTO: 31.8 PG (ref 27–33)
MCHC RBC AUTO-ENTMCNC: 33.3 G/DL (ref 33.7–35.3)
MCV RBC AUTO: 95.7 FL (ref 81.4–97.8)
MONOCYTES # BLD AUTO: 0.42 K/UL (ref 0–0.85)
MONOCYTES NFR BLD AUTO: 8.9 % (ref 0–13.4)
NEUTROPHILS # BLD AUTO: 2.79 K/UL (ref 1.82–7.42)
NEUTROPHILS NFR BLD: 59.4 % (ref 44–72)
NRBC # BLD AUTO: 0 K/UL
NRBC BLD-RTO: 0 /100 WBC
PLATELET # BLD AUTO: 198 K/UL (ref 164–446)
PMV BLD AUTO: 9.8 FL (ref 9–12.9)
POTASSIUM SERPL-SCNC: 4.1 MMOL/L (ref 3.6–5.5)
PROT SERPL-MCNC: 6.9 G/DL (ref 6–8.2)
RBC # BLD AUTO: 4.46 M/UL (ref 4.7–6.1)
SODIUM SERPL-SCNC: 139 MMOL/L (ref 135–145)
WBC # BLD AUTO: 4.7 K/UL (ref 4.8–10.8)

## 2020-06-29 PROCEDURE — 36415 COLL VENOUS BLD VENIPUNCTURE: CPT

## 2020-06-29 PROCEDURE — 71260 CT THORAX DX C+: CPT

## 2020-06-29 PROCEDURE — 700117 HCHG RX CONTRAST REV CODE 255: Performed by: INTERNAL MEDICINE

## 2020-06-29 PROCEDURE — 80053 COMPREHEN METABOLIC PANEL: CPT

## 2020-06-29 PROCEDURE — 85025 COMPLETE CBC W/AUTO DIFF WBC: CPT

## 2020-06-29 RX ADMIN — IOHEXOL 100 ML: 350 INJECTION, SOLUTION INTRAVENOUS at 11:15

## 2020-06-29 RX ADMIN — IOHEXOL 25 ML: 240 INJECTION, SOLUTION INTRATHECAL; INTRAVASCULAR; INTRAVENOUS; ORAL at 11:15

## 2020-07-08 ENCOUNTER — TELEPHONE (OUTPATIENT)
Dept: PULMONOLOGY | Facility: HOSPICE | Age: 60
End: 2020-07-08

## 2020-07-08 NOTE — TELEPHONE ENCOUNTER
The patient called and stated that per his recent chest CT, he is having a biopsy done by Dr. Pugh.  It is not scheduled yet.  He wanted you to know and to see if there is anything you want him to do.  Please advise, thank you.

## 2020-07-16 RX ORDER — SODIUM CHLORIDE 9 MG/ML
INJECTION, SOLUTION INTRAVENOUS CONTINUOUS
Status: CANCELLED | OUTPATIENT
Start: 2020-07-21

## 2020-07-17 ENCOUNTER — OFFICE VISIT (OUTPATIENT)
Dept: ADMISSIONS | Facility: MEDICAL CENTER | Age: 60
End: 2020-07-17
Attending: INTERNAL MEDICINE
Payer: COMMERCIAL

## 2020-07-17 DIAGNOSIS — Z01.812 PRE-OPERATIVE LABORATORY EXAMINATION: ICD-10-CM

## 2020-07-17 DIAGNOSIS — Z01.812 PRE-PROCEDURAL LABORATORY EXAMINATION: ICD-10-CM

## 2020-07-17 LAB
COVID ORDER STATUS COVID19: NORMAL
INR PPP: 0.97 (ref 0.87–1.13)
PROTHROMBIN TIME: 13.2 SEC (ref 12–14.6)
SARS-COV-2 RNA RESP QL NAA+PROBE: NOTDETECTED
SPECIMEN SOURCE: NORMAL

## 2020-07-17 PROCEDURE — 36415 COLL VENOUS BLD VENIPUNCTURE: CPT

## 2020-07-17 PROCEDURE — 85610 PROTHROMBIN TIME: CPT

## 2020-07-17 PROCEDURE — U0003 INFECTIOUS AGENT DETECTION BY NUCLEIC ACID (DNA OR RNA); SEVERE ACUTE RESPIRATORY SYNDROME CORONAVIRUS 2 (SARS-COV-2) (CORONAVIRUS DISEASE [COVID-19]), AMPLIFIED PROBE TECHNIQUE, MAKING USE OF HIGH THROUGHPUT TECHNOLOGIES AS DESCRIBED BY CMS-2020-01-R: HCPCS

## 2020-07-17 ASSESSMENT — FIBROSIS 4 INDEX: FIB4 SCORE: 1.17

## 2020-07-20 DIAGNOSIS — J45.40 MODERATE PERSISTENT ASTHMA WITHOUT COMPLICATION: ICD-10-CM

## 2020-07-20 NOTE — TELEPHONE ENCOUNTER
Have we ever prescribed this med? Yes.  If yes, what date? 12/05/19    Last OV: 12/05/2019 with Dr Easley  We will continue with surveillance with chest CAT scan within 6 months.  Return in about 4 months (around 4/5/2020).    Next OV: No Pending appt.    DX: Moderate persistent asthma without complication (J45.40)     Medications:   Requested Prescriptions     Pending Prescriptions Disp Refills   • fluticasone-salmeterol (ADVAIR) 500-50 MCG/DOSE AEROSOL POWDER, BREATH ACTIVATED [Pharmacy Med Name: FLUTICASONE-SALMETEROL 500-50 AEPB]  6     Sig: INHALE ONE PUFF BY MOUTH TWICE A DAY RINSE MOUTH AFTER EACH USE

## 2020-07-21 ENCOUNTER — HOSPITAL ENCOUNTER (OUTPATIENT)
Facility: MEDICAL CENTER | Age: 60
End: 2020-07-21
Attending: INTERNAL MEDICINE | Admitting: INTERNAL MEDICINE
Payer: COMMERCIAL

## 2020-07-21 ENCOUNTER — APPOINTMENT (OUTPATIENT)
Dept: RADIOLOGY | Facility: MEDICAL CENTER | Age: 60
End: 2020-07-21
Attending: INTERNAL MEDICINE
Payer: COMMERCIAL

## 2020-07-21 ENCOUNTER — APPOINTMENT (OUTPATIENT)
Dept: RADIOLOGY | Facility: MEDICAL CENTER | Age: 60
End: 2020-07-21
Attending: RADIOLOGY
Payer: COMMERCIAL

## 2020-07-21 VITALS
WEIGHT: 192.9 LBS | HEIGHT: 67 IN | HEART RATE: 75 BPM | TEMPERATURE: 97.1 F | OXYGEN SATURATION: 96 % | RESPIRATION RATE: 18 BRPM | SYSTOLIC BLOOD PRESSURE: 128 MMHG | BODY MASS INDEX: 30.28 KG/M2 | DIASTOLIC BLOOD PRESSURE: 65 MMHG

## 2020-07-21 DIAGNOSIS — C49.A9 GASTROINTESTINAL STROMAL TUMOR OF OTHER SITES (HCC): ICD-10-CM

## 2020-07-21 DIAGNOSIS — C49.A2 MALIGNANT GASTRIC STROMAL TUMOR (HCC): ICD-10-CM

## 2020-07-21 LAB — PATHOLOGY CONSULT NOTE: NORMAL

## 2020-07-21 PROCEDURE — 71045 X-RAY EXAM CHEST 1 VIEW: CPT

## 2020-07-21 PROCEDURE — 88341 IMHCHEM/IMCYTCHM EA ADD ANTB: CPT

## 2020-07-21 PROCEDURE — 88305 TISSUE EXAM BY PATHOLOGIST: CPT

## 2020-07-21 PROCEDURE — 700111 HCHG RX REV CODE 636 W/ 250 OVERRIDE (IP): Performed by: RADIOLOGY

## 2020-07-21 PROCEDURE — 32405 CT-BIOPSY-LUNG/MEDIASTINUM: CPT | Mod: LT

## 2020-07-21 PROCEDURE — A9270 NON-COVERED ITEM OR SERVICE: HCPCS

## 2020-07-21 PROCEDURE — 700102 HCHG RX REV CODE 250 W/ 637 OVERRIDE(OP)

## 2020-07-21 PROCEDURE — 160002 HCHG RECOVERY MINUTES (STAT)

## 2020-07-21 PROCEDURE — 88342 IMHCHEM/IMCYTCHM 1ST ANTB: CPT

## 2020-07-21 PROCEDURE — 700105 HCHG RX REV CODE 258: Performed by: NURSE PRACTITIONER

## 2020-07-21 PROCEDURE — 88312 SPECIAL STAINS GROUP 1: CPT | Mod: 59

## 2020-07-21 PROCEDURE — 700111 HCHG RX REV CODE 636 W/ 250 OVERRIDE (IP)

## 2020-07-21 RX ORDER — ACETAMINOPHEN 500 MG
TABLET ORAL
Status: COMPLETED
Start: 2020-07-21 | End: 2020-07-21

## 2020-07-21 RX ORDER — MIDAZOLAM HYDROCHLORIDE 1 MG/ML
.5-2 INJECTION INTRAMUSCULAR; INTRAVENOUS PRN
Status: ACTIVE | OUTPATIENT
Start: 2020-07-21 | End: 2020-07-21

## 2020-07-21 RX ORDER — MIDAZOLAM HYDROCHLORIDE 1 MG/ML
INJECTION INTRAMUSCULAR; INTRAVENOUS
Status: COMPLETED
Start: 2020-07-21 | End: 2020-07-21

## 2020-07-21 RX ORDER — HYDROMORPHONE HYDROCHLORIDE 2 MG/ML
0.5 INJECTION, SOLUTION INTRAMUSCULAR; INTRAVENOUS; SUBCUTANEOUS
Status: DISCONTINUED | OUTPATIENT
Start: 2020-07-21 | End: 2020-07-21 | Stop reason: HOSPADM

## 2020-07-21 RX ORDER — ACETAMINOPHEN 500 MG
1000 TABLET ORAL EVERY 6 HOURS
Status: DISCONTINUED | OUTPATIENT
Start: 2020-07-21 | End: 2020-07-21 | Stop reason: HOSPADM

## 2020-07-21 RX ORDER — NALOXONE HYDROCHLORIDE 0.4 MG/ML
INJECTION, SOLUTION INTRAMUSCULAR; INTRAVENOUS; SUBCUTANEOUS
Status: COMPLETED
Start: 2020-07-21 | End: 2020-07-21

## 2020-07-21 RX ORDER — SODIUM CHLORIDE 9 MG/ML
INJECTION, SOLUTION INTRAVENOUS CONTINUOUS
Status: DISCONTINUED | OUTPATIENT
Start: 2020-07-21 | End: 2020-07-21 | Stop reason: HOSPADM

## 2020-07-21 RX ORDER — ONDANSETRON 2 MG/ML
4 INJECTION INTRAMUSCULAR; INTRAVENOUS EVERY 8 HOURS PRN
Status: DISCONTINUED | OUTPATIENT
Start: 2020-07-21 | End: 2020-07-21 | Stop reason: HOSPADM

## 2020-07-21 RX ORDER — ONDANSETRON 2 MG/ML
4 INJECTION INTRAMUSCULAR; INTRAVENOUS PRN
Status: ACTIVE | OUTPATIENT
Start: 2020-07-21 | End: 2020-07-21

## 2020-07-21 RX ORDER — OXYCODONE HYDROCHLORIDE 5 MG/1
5 TABLET ORAL
Status: DISCONTINUED | OUTPATIENT
Start: 2020-07-21 | End: 2020-07-21 | Stop reason: HOSPADM

## 2020-07-21 RX ORDER — OXYCODONE HYDROCHLORIDE 10 MG/1
10 TABLET ORAL
Status: DISCONTINUED | OUTPATIENT
Start: 2020-07-21 | End: 2020-07-21 | Stop reason: HOSPADM

## 2020-07-21 RX ORDER — SODIUM CHLORIDE 9 MG/ML
500 INJECTION, SOLUTION INTRAVENOUS
Status: ACTIVE | OUTPATIENT
Start: 2020-07-21 | End: 2020-07-21

## 2020-07-21 RX ADMIN — MIDAZOLAM HYDROCHLORIDE 1 MG: 1 INJECTION, SOLUTION INTRAMUSCULAR; INTRAVENOUS at 09:26

## 2020-07-21 RX ADMIN — FENTANYL CITRATE 50 MCG: 50 INJECTION INTRAMUSCULAR; INTRAVENOUS at 09:26

## 2020-07-21 RX ADMIN — MIDAZOLAM HYDROCHLORIDE 1 MG: 1 INJECTION, SOLUTION INTRAMUSCULAR; INTRAVENOUS at 09:28

## 2020-07-21 RX ADMIN — Medication 1000 MG: at 12:35

## 2020-07-21 RX ADMIN — FENTANYL CITRATE 25 MCG: 50 INJECTION INTRAMUSCULAR; INTRAVENOUS at 09:28

## 2020-07-21 RX ADMIN — SODIUM CHLORIDE: 9 INJECTION, SOLUTION INTRAVENOUS at 06:46

## 2020-07-21 RX ADMIN — ACETAMINOPHEN 1000 MG: 500 TABLET ORAL at 12:35

## 2020-07-21 ASSESSMENT — FIBROSIS 4 INDEX: FIB4 SCORE: 1.17

## 2020-07-21 NOTE — OR SURGEON
Immediate Post- Operative Note        PostOp Diagnosis: LLL NODULES    Procedure(s):CT BX    Estimated Blood Loss: Less than 5 ml        Complications: None            7/21/2020     9:37 AM     Sonido Moore M.D.

## 2020-07-21 NOTE — DISCHARGE INSTRUCTIONS
ACTIVITY: Rest and take it easy for the first 24 hours.  A responsible adult is recommended to remain with you during that time.  It is normal to feel sleepy.  We encourage you to not do anything that requires balance, judgment or coordination.    MILD FLU-LIKE SYMPTOMS ARE NORMAL. YOU MAY EXPERIENCE GENERALIZED MUSCLE ACHES, THROAT IRRITATION, HEADACHE AND/OR SOME NAUSEA.    FOR 24 HOURS DO NOT:  Drive, operate machinery or run household appliances.  Drink beer or alcoholic beverages.   Make important decisions or sign legal documents.    SPECIAL INSTRUCTIONS:     Lung Biopsy, Care After  This sheet gives you information about how to care for yourself after your procedure. Your health care provider may also give you more specific instructions depending on the type of biopsy you had. If you have problems or questions, contact your health care provider.  What can I expect after the procedure?  After the procedure, it is common to have:  · A cough.  · A sore throat.  · Pain where a needle, bronchoscope, or incision was used to collect a biopsy sample (biopsy site).  Follow these instructions at home:  Medicines  · Take over-the-counter and prescription medicines only as told by your health care provider.  · Do not drink alcohol if your health care provider tells you not to drink.  · Ask your health care provider if the medicine prescribed to you:  ? Requires you to avoid driving or using heavy machinery.  ? Can cause constipation. You may need to take these actions to prevent or treat constipation:  § Drink enough fluid to keep your urine pale yellow.  § Take over-the-counter or prescription medicines.  § Eat foods that are high in fiber, such as beans, whole grains, and fresh fruits and vegetables.  § Limit foods that are high in fat and processed sugars, such as fried or sweet foods.  · Do not drive for 24 hours if you were given a sedative.  Biopsy site care    · Follow instructions from your health care provider  about how to take care of your biopsy site. Make sure you:  ? Wash your hands with soap and water before and after you change your bandage (dressing). If soap and water are not available, use hand .  ? Change your dressing as told by your health care provider.  ? Leave stitches (sutures), skin glue, or adhesive strips in place. These skin closures may need to stay in place for 2 weeks or longer. If adhesive strip edges start to loosen and curl up, you may trim the loose edges. Do not remove adhesive strips completely unless your health care provider tells you to do that.  · Do not take baths, swim, or use a hot tub until your health care provider approves. Ask your health care provider if you may take showers. You may only be allowed to take sponge baths.  · Check your biopsy site every day for signs of infection. Check for:  ? Redness, swelling, or more pain.  ? Fluid or blood.  ? Warmth.  ? Pus or a bad smell.  General instructions  · Return to your normal activities as told by your health care provider. Ask your health care provider what activities are safe for you.  · It is up to you to get the results of your procedure. Ask your health care provider, or the department that is doing the procedure, when your results will be ready.  · Keep all follow-up visits as told by your health care provider. This is important.  Contact a health care provider if:  · You have a fever.  · You have redness, swelling, or more pain around your biopsy site.  · You have fluid or blood coming from your biopsy site.  · Your biopsy site feels warm to the touch.  · You have pus or a bad smell coming from your biopsy site.  · You have pain that does not get better with medicine.  Get help right away if:  · You cough up blood.  · You have trouble breathing.  · You have chest pain.  · You lose consciousness.  Summary  · After the procedure, it is common to have a sore throat and a cough.  · Return to your normal activities as told  by your health care provider. Ask your health care provider what activities are safe for you.  · Take over-the-counter and prescription medicines only as told by your health care provider.  · Report any unusual symptoms to your health care provider.    DIET: To avoid nausea, slowly advance diet as tolerated, avoiding spicy or greasy foods for the first day.  Add more substantial food to your diet according to your physician's instructions.  Babies can be fed formula or breast milk as soon as they are hungry.  INCREASE FLUIDS AND FIBER TO AVOID CONSTIPATION.    SURGICAL DRESSING/BATHING: Keep dressing and incision dry and intact for 24 hours, may remove dressing and shower after 1 PM on 7/22, do not need to replace.  Do not submerge site in water for 7 days.    FOLLOW-UP APPOINTMENT:  A follow-up appointment should be arranged with your Dr. Pugh [phone]; call to schedule.    You should CALL YOUR PHYSICIAN if you develop:  Fever greater than 101 degrees F.  Pain not relieved by medication, or persistent nausea or vomiting.  Excessive bleeding (blood soaking through dressing) or unexpected drainage from the wound.  Extreme redness or swelling around the incision site, drainage of pus or foul smelling drainage.  Inability to urinate or empty your bladder within 8 hours.  Problems with breathing or chest pain.    You should call 911 if you develop problems with breathing or chest pain.  If you are unable to contact your doctor or surgical center, you should go to the nearest emergency room or urgent care center.  Physician's telephone #: [phone]    If any questions arise, call your doctor.  If your doctor is not available, please feel free to call the Surgical Center at [phone].  The Center is open Monday through Friday from 7AM to 7PM.  You can also call the [facility] HOTLINE open 24 hours/day, 7 days/week and speak to a nurse at [phone], or toll free at [phone].    A registered nurse may call  you a few days after your surgery to see how you are doing after your procedure.    MEDICATIONS: Resume taking daily medication.  Take prescribed pain medication with food.  If no medication is prescribed, you may take non-aspirin pain medication if needed.  PAIN MEDICATION CAN BE VERY CONSTIPATING.  Take a stool softener or laxative such as senokot, pericolace, or milk of magnesia if needed.    If your physician has prescribed pain medication that includes Acetaminophen (Tylenol), do not take additional Acetaminophen (Tylenol) while taking the prescribed medication.    Depression / Suicide Risk    As you are discharged from this [facility] facility, it is important to learn how to keep safe from harming yourself.    Recognize the warning signs:  · Abrupt changes in personality, positive or negative- including increase in energy   · Giving away possessions  · Change in eating patterns- significant weight changes-  positive or negative  · Change in sleeping patterns- unable to sleep or sleeping all the time   · Unwillingness or inability to communicate  · Depression  · Unusual sadness, discouragement and loneliness  · Talk of wanting to die  · Neglect of personal appearance   · Rebelliousness- reckless behavior  · Withdrawal from people/activities they love  · Confusion- inability to concentrate     If you or a loved one observes any of these behaviors or has concerns about self-harm, here's what you can do:  · Talk about it- your feelings and reasons for harming yourself  · Remove any means that you might use to hurt yourself (examples: pills, rope, extension cords, firearm)  · Get professional help from the community (Mental Health, Substance Abuse, psychological counseling)  · Do not be alone:Call your Safe Contact- someone whom you trust who will be there for you.  · Call your local CRISIS HOTLINE 130-6633 or 808-839-6032  · Call your local Children's Mobile Crisis Response Team [place] (544) 102-5070  or [email or web address].R[employer]orGator  · Call the toll free National Suicide Prevention Hotlines   · National Suicide Prevention Lifeline 400-741-GJVJ (4615)  · National LXSN Line Network 800-SUICIDE (471-7707)

## 2020-07-21 NOTE — PROGRESS NOTES
Patient consented to a left lung biopsy with possible chest tube placement by Dr. Moore. Procedure site was marked by MD and verified using imaging guidance. Patient was placed in a prone position. Vitals were taken every 5 minutes and remained stable during procedure (see doc flow sheet for results). CO2 waveform capnography was monitored and remained 35-41 throughout procedure. All sedation given at discretion of MD Moore, patient appeared to tolerate procedure well. A tegaderm and gauze dressing was placed over surgical site. Report called to Yany GARCIA. Pt transported by donte with this RN to [facility]. Core Labs X 3 in formalin hand delivered to lab by Tamr     [employer] Tract Sealant System Ref# [id number]  Lot# [id number]  Exp Date 05/31/2022

## 2020-07-21 NOTE — OR NURSING
0951 Patient arrived to unit, awake and alert.  Access site clean, dry and soft.  Patient denies pain at this time.  Updated on plan of care, verbalized understanding.  0959 Patient's wife Lavelle called and updated on patient status.  1030 Access site clean, dry and soft.  Patient arouses to voice, denies discomfort.  1135 First chest xray negative for pneumothorax, patient given food per request.  1215 Patient resting in gurney, denies discomfort.  1315 Patient up to edge of bed, denies discomfort.  Access site clean, dry and soft.  All lines and monitors discontinued.  1330 Reviewed discharge paperwork with pt and wife Lavelle. Discussed diet, activity, medications, follow up care and worsening symptoms. No questions at this time.   1335 Pt discharged home with Lavelle via wheelchair by TRAVIS.

## 2020-07-24 ENCOUNTER — TELEPHONE (OUTPATIENT)
Dept: PULMONOLOGY | Facility: HOSPICE | Age: 60
End: 2020-07-24

## 2020-07-24 NOTE — TELEPHONE ENCOUNTER
Pt called stating his oncologist ordered a CT-NEEDLE BX-LUNG-MEDIASTINUM LEFT  And he is wanting NABOR Easley MD to review results and give recommendations if necessary.

## 2020-10-12 ENCOUNTER — TELEPHONE (OUTPATIENT)
Dept: PULMONOLOGY | Facility: HOSPICE | Age: 60
End: 2020-10-12

## 2020-10-12 DIAGNOSIS — J45.40 MODERATE PERSISTENT ASTHMA WITHOUT COMPLICATION: ICD-10-CM

## 2020-10-12 RX ORDER — ACLIDINIUM BROMIDE 400 UG/1
1 POWDER, METERED RESPIRATORY (INHALATION) 2 TIMES DAILY
Qty: 1 EACH | Refills: 5 | Status: SHIPPED | OUTPATIENT
Start: 2020-10-12 | End: 2021-08-16 | Stop reason: SDUPTHER

## 2020-10-12 NOTE — TELEPHONE ENCOUNTER
Have we ever prescribed this med? Yes.  If yes, what date? 12/5/19    Last OV: 12/5/19    Next OV: 11/3/20    DX: asthma    Medications: Tudorza

## 2020-10-14 ENCOUNTER — TELEPHONE (OUTPATIENT)
Dept: PULMONOLOGY | Facility: HOSPICE | Age: 60
End: 2020-10-14

## 2020-10-14 NOTE — TELEPHONE ENCOUNTER
MEDICATION PRIOR AUTHORIZATION NEEDED:    1. Name of Medication: Tudorza Pressair 400 mcg    2. Requested By (Name of Pharmacy): [facility]     3. Is insurance on file current? yes    4. What is the name & phone number of the 3rd party payor? Daniel

## 2020-10-16 NOTE — TELEPHONE ENCOUNTER
DOCUMENTATION OF PRIOR AUTH STATUS    1. Medication name and dose: Tudorza Pressair     2. Name and Phone # of Prescription coverage company: [employer]     3. Date Prior Auth was submitted: 10/15/2020    4. What information was given to obtain insurance decision: Clinical notes    5. Prior Auth letter Approved or Denied: Approved through 10/15/2021    6. Pharmacy notified: Yes    7. Patient notified: Yes

## 2020-12-14 ENCOUNTER — APPOINTMENT (OUTPATIENT)
Dept: RADIOLOGY | Facility: MEDICAL CENTER | Age: 60
End: 2020-12-14
Attending: EMERGENCY MEDICINE
Payer: COMMERCIAL

## 2020-12-14 ENCOUNTER — HOSPITAL ENCOUNTER (EMERGENCY)
Facility: MEDICAL CENTER | Age: 60
End: 2020-12-14
Attending: EMERGENCY MEDICINE
Payer: COMMERCIAL

## 2020-12-14 ENCOUNTER — OFFICE VISIT (OUTPATIENT)
Dept: URGENT CARE | Facility: PHYSICIAN GROUP | Age: 60
End: 2020-12-14
Payer: COMMERCIAL

## 2020-12-14 VITALS
HEART RATE: 51 BPM | RESPIRATION RATE: 16 BRPM | SYSTOLIC BLOOD PRESSURE: 142 MMHG | BODY MASS INDEX: 29.51 KG/M2 | HEIGHT: 67 IN | WEIGHT: 188 LBS | OXYGEN SATURATION: 98 % | DIASTOLIC BLOOD PRESSURE: 80 MMHG | TEMPERATURE: 96.9 F

## 2020-12-14 VITALS
WEIGHT: 188.05 LBS | TEMPERATURE: 97.4 F | OXYGEN SATURATION: 98 % | BODY MASS INDEX: 29.52 KG/M2 | SYSTOLIC BLOOD PRESSURE: 156 MMHG | HEART RATE: 57 BPM | DIASTOLIC BLOOD PRESSURE: 80 MMHG | RESPIRATION RATE: 16 BRPM | HEIGHT: 67 IN

## 2020-12-14 DIAGNOSIS — N20.1 URETEROLITHIASIS: ICD-10-CM

## 2020-12-14 DIAGNOSIS — R30.0 DYSURIA: ICD-10-CM

## 2020-12-14 DIAGNOSIS — R10.9 RIGHT FLANK PAIN: ICD-10-CM

## 2020-12-14 LAB
APPEARANCE UR: ABNORMAL
APPEARANCE UR: CLEAR
BACTERIA #/AREA URNS HPF: NEGATIVE /HPF
BILIRUB UR QL STRIP.AUTO: NEGATIVE
BILIRUB UR STRIP-MCNC: NORMAL MG/DL
COLOR UR AUTO: YELLOW
COLOR UR: ABNORMAL
EPI CELLS #/AREA URNS HPF: NEGATIVE /HPF
GLUCOSE UR STRIP.AUTO-MCNC: NEGATIVE MG/DL
GLUCOSE UR STRIP.AUTO-MCNC: NORMAL MG/DL
HYALINE CASTS #/AREA URNS LPF: ABNORMAL /LPF
KETONES UR STRIP.AUTO-MCNC: 15 MG/DL
KETONES UR STRIP.AUTO-MCNC: NORMAL MG/DL
LEUKOCYTE ESTERASE UR QL STRIP.AUTO: ABNORMAL
LEUKOCYTE ESTERASE UR QL STRIP.AUTO: NORMAL
MICRO URNS: ABNORMAL
NITRITE UR QL STRIP.AUTO: NEGATIVE
NITRITE UR QL STRIP.AUTO: NORMAL
PH UR STRIP.AUTO: 5 [PH] (ref 5–8)
PH UR STRIP.AUTO: 5.5 [PH] (ref 5–8)
PROT UR QL STRIP: 30 MG/DL
PROT UR QL STRIP: NORMAL MG/DL
RBC # URNS HPF: ABNORMAL /HPF
RBC UR QL AUTO: ABNORMAL
RBC UR QL AUTO: NORMAL
SP GR UR STRIP.AUTO: 1.02
SP GR UR STRIP.AUTO: 1.03
UROBILINOGEN UR STRIP-MCNC: NORMAL MG/DL
UROBILINOGEN UR STRIP.AUTO-MCNC: 1 MG/DL
WBC #/AREA URNS HPF: ABNORMAL /HPF

## 2020-12-14 PROCEDURE — 74176 CT ABD & PELVIS W/O CONTRAST: CPT

## 2020-12-14 PROCEDURE — 96372 THER/PROPH/DIAG INJ SC/IM: CPT

## 2020-12-14 PROCEDURE — 81001 URINALYSIS AUTO W/SCOPE: CPT

## 2020-12-14 PROCEDURE — 99284 EMERGENCY DEPT VISIT MOD MDM: CPT

## 2020-12-14 PROCEDURE — 81002 URINALYSIS NONAUTO W/O SCOPE: CPT | Performed by: PHYSICIAN ASSISTANT

## 2020-12-14 PROCEDURE — 99213 OFFICE O/P EST LOW 20 MIN: CPT | Performed by: PHYSICIAN ASSISTANT

## 2020-12-14 PROCEDURE — 700111 HCHG RX REV CODE 636 W/ 250 OVERRIDE (IP): Performed by: EMERGENCY MEDICINE

## 2020-12-14 RX ORDER — TAMSULOSIN HYDROCHLORIDE 0.4 MG/1
0.4 CAPSULE ORAL DAILY
Qty: 30 CAP | Refills: 0 | Status: SHIPPED | OUTPATIENT
Start: 2020-12-14 | End: 2022-11-15

## 2020-12-14 RX ORDER — HYDROCODONE BITARTRATE AND ACETAMINOPHEN 5; 325 MG/1; MG/1
1 TABLET ORAL EVERY 4 HOURS PRN
Qty: 10 TAB | Refills: 0 | Status: SHIPPED | OUTPATIENT
Start: 2020-12-14 | End: 2020-12-16

## 2020-12-14 RX ORDER — KETOROLAC TROMETHAMINE 30 MG/ML
15 INJECTION, SOLUTION INTRAMUSCULAR; INTRAVENOUS ONCE
Status: COMPLETED | OUTPATIENT
Start: 2020-12-14 | End: 2020-12-14

## 2020-12-14 RX ADMIN — KETOROLAC TROMETHAMINE 15 MG: 30 INJECTION, SOLUTION INTRAMUSCULAR at 17:00

## 2020-12-14 ASSESSMENT — PAIN DESCRIPTION - PAIN TYPE
TYPE: ACUTE PAIN
TYPE: ACUTE PAIN

## 2020-12-14 ASSESSMENT — ENCOUNTER SYMPTOMS
FEVER: 0
ABDOMINAL PAIN: 1
CHILLS: 0
FLANK PAIN: 1

## 2020-12-14 ASSESSMENT — FIBROSIS 4 INDEX
FIB4 SCORE: 1.19027940128723117
FIB4 SCORE: 1.19027940128723117

## 2020-12-14 ASSESSMENT — LIFESTYLE VARIABLES: DO YOU DRINK ALCOHOL: NO

## 2020-12-14 NOTE — ED TRIAGE NOTES
Ambulatory to triage with   Chief Complaint   Patient presents with   • Groin Pain   • RLQ Pain   • Flank Pain     Right   • Sent from Urgent Care   Onset of above symptoms in the middle of night. States severe 10/10 sharp to R flank, R groin, and RLQ. States he experienced some oliguria. Sent from  for evaluation of a kidney stone. States that he had a sudden decrease of pain down to a 4/10 after arriving to ED. Denies fever or chills.     Hx of stomach CA, on oral chemo. Placed in family room. Has a mask in place. Abd pain protocol ordered.

## 2020-12-14 NOTE — ED PROVIDER NOTES
ED Provider Note    Scribed for Kina Hickman M.D. by Renée Marquez. 12/14/2020, 2:40 PM.    Primary care provider: Sarah Doyle D.O.  Means of arrival: Walk in  History obtained from: Patient  History limited by: None    CHIEF COMPLAINT  Chief Complaint   Patient presents with   • Groin Pain   • RLQ Pain   • Flank Pain     Right   • Sent from Urgent Care     This patient was cared for during the COVID-19 pandemic. History and physical exam may be limited/truncated by the inherent challenges of PPE and the need to decrease staff exposure to novel coronavirus. Some aspects of disease management may be different to protect staff and help slow the spread of disease. I verified that, if possible, the patient was wearing a mask and I was wearing appropriate PPE every time I encountered the patient.     HPI  Prashant Hewitt is a 60 y.o. male who presents to the Emergency Department for evaluation of right flank pain that began early this morning. Patient states he woke up severe pain describes as 10/10 in severity. The pain was localizes to his right flank, right lower quadrant, and right testicle. He believed he was passing a kidney stone and went to Urgent Care. He was at Urgent Care for 3 hours before he was told they could not provide medications or preform a CT. Patient reports the pain was so bad he was crying and had one episode of vomiting. After the episode of vomiting he felt some relief. He then came here to the ED for further care. He reports his pain has alleviated some as he has waited and is now a 4/10 on exam. Patient noted there are blood clots in his urine sample. He denies any fever, chills, or cough. Patient has a history of cancer with removal of half of his stomach. He has also had a cholecystectomy. He reports kidney stones present in previous CT scans.     REVIEW OF SYSTEMS  Pertinent positives include right flank pain, right lower quadrant pain, right testicle pain, nausea, vomiting,  hematuria. Pertinent negatives include no fever, chills, cough.  All other systems reviewed and negative.    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    SURGICAL HISTORY   has a past surgical history that includes other; septoplasty; finger amputation (Right); cholecystectomy; laparotomy; sinuscope; upper gi endoscopy,biopsy (7/16/2019); gastroscopy (7/16/2019); egd w/endoscopic ultrasound (7/16/2019); and egd with asp/bx (7/16/2019).    SOCIAL HISTORY  Social History     Tobacco Use   • Smoking status: Passive Smoke Exposure - Never Smoker   • Smokeless tobacco: Never Used   Substance Use Topics   • Alcohol use: Yes     Comment: 1-2 PER WEEK   • Drug use: No      Social History     Substance and Sexual Activity   Drug Use No       FAMILY HISTORY  Family History   Problem Relation Age of Onset   • Hypertension Mother    • Other Mother         glaucoma    • Cancer Father         pancreatic    • Heart Disease Father    • Hypertension Brother        CURRENT MEDICATIONS  Home Medications     Reviewed by Corry Wilson R.N. (Registered Nurse) on 12/14/20 at 1205  Med List Status: <None>   Medication Last Dose Status   Aclidinium Bromide (TUDORZA PRESSAIR) 400 MCG/ACT AEROSOL POWDER, BREATH ACTIVATED  Active   Aclidinium Bromide 400 MCG/ACT AEROSOL POWDER, BREATH ACTIVATED  Active   albuterol (PROVENTIL) 2.5mg/3ml Nebu Soln solution for nebulization  Active   albuterol (VENTOLIN HFA) 108 (90 Base) MCG/ACT Aero Soln inhalation aerosol  Active   fluticasone-salmeterol (ADVAIR) 500-50 MCG/DOSE AEROSOL POWDER, BREATH ACTIVATED  Active   imatinib (GLEEVEC) 400 MG tablet  Active   sildenafil citrate (VIAGRA) 100 MG tablet  Active                ALLERGIES  Allergies   Allergen Reactions   • Penicillins  "Rash     Rash as [age] year old   • Succinylcholine Unspecified     \"weakness and ill\", stopped breathing   • Epinephrine Unspecified     Highly sensitive.   7/17/20- pt states he can have epinephrine at reduced dose.       PHYSICAL EXAM  VITAL SIGNS: /71   Pulse 62   Temp 36.3 °C (97.4 °F)   Resp 16   Ht 1.702 m (5' 7\")   Wt 85.3 kg (188 lb 0.8 oz)   SpO2 96%   BMI 29.45 kg/m²   Constitutional: Alert in no apparent distress.  HENT: No signs of trauma, Bilateral external ears normal, Nose normal.   Eyes: Pupils are equal and reactive, Conjunctiva normal, Non-icteric.   Neck: Normal range of motion, No tenderness, Supple, No stridor.   Cardiovascular: Regular rate and rhythm, no murmurs.   Thorax & Lungs: Normal breath sounds, No respiratory distress, No wheezing, No chest tenderness.   Abdomen: Bowel sounds normal, Soft, No masses, No peritoneal signs. Mild lower quadrant tenderness.   Skin: Warm, Dry, No erythema, No rash.   Back: No bony tenderness, No CVA tenderness.  Musculoskeletal:  No major deformities noted.  Neurologic: Alert, moving all extremities without difficulty, no focal deficits.    LABS  Labs Reviewed   URINALYSIS,CULTURE IF INDICATED - Abnormal; Notable for the following components:       Result Value    Character Turbid (*)     Ketones 15 (*)     Protein 30 (*)     Leukocyte Esterase Trace (*)     Occult Blood Large (*)     All other components within normal limits    Narrative:     Indication for culture:->Patient WITHOUT an indwelling Jackson  catheter in place with new onset of Dysuria, Frequency,  Urgency, and/or Suprapubic pain   URINE MICROSCOPIC (W/UA) - Abnormal; Notable for the following components:    WBC 0-2 (*)     RBC  (*)     Hyaline Cast 6-10 (*)     All other components within normal limits    Narrative:     Indication for culture:->Patient WITHOUT an indwelling Jackson  catheter in place with new onset of Dysuria, Frequency,  Urgency, and/or Suprapubic pain     All " "labs reviewed by me.    RADIOLOGY  CT-RENAL COLIC EVALUATION(A/P W/O)   Final Result         1.  OBSTRUCTING 2 MM CALCULUS IS LODGED AT THE RIGHT UVJ. THIS CAUSES MILD RIGHT HYDRONEPHROSIS AND HYDROURETER.      2.  NO OTHER CALCULI IDENTIFIED.           The radiologist's interpretation of all radiological studies have been reviewed by me.    COURSE & MEDICAL DECISION MAKING  Pertinent Labs & Imaging studies reviewed. (See chart for details)    Differential diagnoses include but are not limited to: kidney stone, appendicitis, pyelonephritis    2:40 PM - Patient seen and examined at bedside. Ordered CT Renal Colic, Urinalysis, Urine microscopic to evaluate his symptoms. I informed the patient that I will use CT and urine to evaluate his symptoms and wait on any blood work unless there is signs of infection to evaluate their symptoms. He denies a need for pain medication at this time. They verbalize understanding to the plan of care.     4:32 PM Ordered ketorolac 15 mg for pain management.     4:44 PM - Patient reevaluated at bedside.  Reviewed results.  He has hematuria and evidence of a 2 mm obstructing stone.  Given that his pain is significantly improved I suspect that he is having self.  The size is small enough that it should pass on its own.  He has no evidence a UTI on his urinalysis.  I do think he can be discharged with a trial to pass.  He will be given Flomax and follow-up with urology.    iscussed plan for discharge; I advised the patient to follow-up with [facility] in 1 week, and to return to the [facility] ED with any new or worsening symptoms, including worsening pain. Patient was given the opportunity for questions. I addressed all questions or concerns at this time and they verbalize agreement to the plan of care. Review of vital signs at this visit show: /81   Pulse 68   Temp 36.3 °C (97.4 °F)   Resp 16   Ht 1.702 m (5' 7\")   Wt 85.3 kg (188 lb 0.8 oz)   SpO2 98%   BMI 29.45 kg/m²        " The patient will return for new or worsening symptoms and is stable at the time of discharge. Patient was given return precautions. Patient and/or family member verbalizes understanding and will comply.    The patient was informed of their blood pressure exceeding 120/80 and I have asked them to follow up with their primary care physician to discuss further monitoring and possible treatment.     In prescribing controlled substances to this patient, I certify that I have obtained and reviewed the medical history of Prashant Hewitt. I have also made a good suha effort to obtain applicable records from other providers who have treated the patient and records did not demonstrate any increased risk of substance abuse that would prevent me from prescribing controlled substances.     I have conducted a physical exam and documented it. I have reviewed Mr. Hewitt’s prescription history as maintained by the [place] Prescription Monitoring Program.     I have assessed the patient’s risk for abuse, dependency, and addiction using the validated Opioid Risk Tool available at https://www.mdcalc.com/tnlrzl-elph-pxzz-ort-narcotic-abuse.     Given the above, I believe the benefits of controlled substance therapy outweigh the risks. The reasons for prescribing controlled substances include non-narcotic, oral analgesic alternatives have been inadequate for pain control. Accordingly, I have discussed the risk and benefits, treatment plan, and alternative therapies with the patient.     DISPOSITION:  Patient will be discharged home in stable condition.    FOLLOW UP:  Ralph Elise M.D.  [address]  [facility] [address]  [phone]    Schedule an appointment as soon as possible for a visit in 1 week      [facility], Emergency Dept  [address] [facility] [id number] [phone]    Return for worsening pain, vomiting or other concerns      OUTPATIENT MEDICATIONS:  Discharge Medication List as of  12/14/2020  5:03 PM      START taking these medications    Details   tamsulosin (FLOMAX) 0.4 MG capsule Take 1 Cap by mouth every day., Disp-30 Cap, R-0, Normal      HYDROcodone-acetaminophen (NORCO) 5-325 MG Tab per tablet Take 1 Tab by mouth every four hours as needed for up to 2 days., Disp-10 Tab, R-0, Normal             FINAL IMPRESSION  1. Ureterolithiasis      This dictation has been created using voice recognition software and/or scribes. The accuracy of the dictation is limited by the abilities of the software and the expertise of the scribes. I expect there may be some errors of grammar and possibly content. I made every attempt to manually correct the errors within my dictation. However, errors related to voice recognition software and/or scribes may still exist and should be interpreted within the appropriate context.     IRenée (Scribe), am scribing for, and in the presence of, Kina Hickman M.D..    Electronically signed by: Renée Marquez (Scribe), 12/14/2020    IKina M.D. personally performed the services described in this documentation, as scribed by Renée Marquez in my presence, and it is both accurate and complete.    C    The note accurately reflects work and decisions made by me.  Kina Hickman M.D.  12/14/2020  9:02 PM

## 2020-12-14 NOTE — PROGRESS NOTES
Subjective:   Prashant Hewitt is a 60 y.o. male who presents today with   Chief Complaint   Patient presents with   • Abdominal Pain     burning upon urinating x 1 day       Dysuria   This is a new problem. The current episode started today. The problem occurs every urination. The problem has been unchanged. The quality of the pain is described as burning, shooting and stabbing. The pain is severe. There is no history of pyelonephritis. Associated symptoms include flank pain. Pertinent negatives include no chills. He has tried nothing for the symptoms. The treatment provided no relief.   Patient is very angry and using multiple profanity, verbally attacking me and taking out his frustration on me.  Once I was able to mildly calm the patient down he told me that his pain started early this morning and it was excruciating pain when he would had to urinate.  He states that the pain does radiate to his right testicle.  Patient notes significant pain with urination starting this morning.  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  MEDS:   Current Outpatient Medications:   •  Aclidinium Bromide (TUDORZA PRESSAIR) 400 MCG/ACT AEROSOL POWDER, BREATH ACTIVATED, Take 1 Inhalation by mouth 2 Times a Day., Disp: 1 Each, Rfl: 5  •  fluticasone-salmeterol (ADVAIR) 500-50 MCG/DOSE AEROSOL POWDER, BREATH ACTIVATED, INHALE ONE PUFF BY MOUTH TWICE A DAY RINSE MOUTH AFTER EACH USE, Disp: 1 Each, Rfl: 3  •  Aclidinium Bromide 400 MCG/ACT AEROSOL POWDER, BREATH ACTIVATED, Inhale 1 Puff by mouth every day., Disp: , Rfl:   •  sildenafil citrate (VIAGRA) 100 MG tablet, Take 0.5 Tabs by mouth 1 time daily as needed for Erectile Dysfunction., Disp: 30 Tab, Rfl: 2  •  albuterol (VENTOLIN HFA) 108 (90 Base) MCG/ACT Aero Soln inhalation  "aerosol, Inhale 1 Puff by mouth every 6 hours as needed for Shortness of Breath., Disp: 1 Inhaler, Rfl: 1  •  albuterol (PROVENTIL) 2.5mg/3ml Nebu Soln solution for nebulization, albuterol sulfate 2.5 mg/3 mL (0.083 %) solution for nebulization, Disp: , Rfl:   •  imatinib (GLEEVEC) 400 MG tablet, imatinib 400 mg tablet, Disp: , Rfl:   ALLERGIES:   Allergies   Allergen Reactions   • Penicillins Rash     Rash as [age] year old   • Succinylcholine Unspecified     \"weakness and ill\", stopped breathing   • Epinephrine Unspecified     Highly sensitive.   7/17/20- pt states he can have epinephrine at reduced dose.     SURGHX:   Past Surgical History:   Procedure Laterality Date   • PB UPPER GI ENDOSCOPY,BIOPSY  7/16/2019    Procedure: GASTROSCOPY, WITH BIOPSY;  Surgeon: Zhou Miller M.D.;  Location: [facility];  Service: EUS   • GASTROSCOPY  7/16/2019    Procedure: GASTROSCOPY;  Surgeon: Zhou Miller M.D.;  Location: [facility];  Service: EUS   • EGD W/ENDOSCOPIC ULTRASOUND  7/16/2019    Procedure: EGD, WITH ENDOSCOPIC US - UPPER CURVILINEAR;  Surgeon: Zhou Miller M.D.;  Location: [facility];  Service: EUS   • EGD WITH ASP/BX  7/16/2019    Procedure: EGD, WITH ASPIRATION BIOPSY;  Surgeon: Zhou Miller M.D.;  Location: [facility];  Service: EUS   • CHOLECYSTECTOMY     • FINGER AMPUTATION Right     right index accident when 5 yo   • LAPAROTOMY     • OTHER      GIST removal 3/2017   • SEPTOPLASTY     • SINUSCOPE       SOCHX:  reports that he is a non-smoker but has been exposed to tobacco smoke. He has never used smokeless tobacco. He reports current alcohol use. He reports that he does not use drugs.  FH: Reviewed with patient, not pertinent to this visit.       Review of Systems   Constitutional: Negative for chills and fever.   Gastrointestinal: Positive for abdominal pain.   Genitourinary: Positive for dysuria and flank pain.   All other systems reviewed " "and are negative.       Objective:   /80 (BP Location: Left arm, Patient Position: Sitting, BP Cuff Size: Adult)   Pulse (!) 51   Temp 36.1 °C (96.9 °F)   Resp 16   Ht 1.702 m (5' 7\")   Wt 85.3 kg (188 lb)   SpO2 98%   BMI 29.44 kg/m²   Physical Exam  Vitals signs and nursing note reviewed.   Constitutional:       General: He is in acute distress.      Appearance: He is well-developed. He is ill-appearing. He is not toxic-appearing or diaphoretic.   HENT:      Head: Normocephalic and atraumatic.      Right Ear: Hearing normal.      Left Ear: Hearing normal.   Eyes:      Conjunctiva/sclera: Conjunctivae normal.   Cardiovascular:      Rate and Rhythm: Normal rate and regular rhythm.      Heart sounds: Normal heart sounds.   Pulmonary:      Effort: Pulmonary effort is normal.   Abdominal:      Tenderness: There is abdominal tenderness in the right lower quadrant. There is no right CVA tenderness, left CVA tenderness, guarding or rebound. Negative signs include Mares's sign and McBurney's sign.       Genitourinary:     Comments: Patient deferred  exam  Musculoskeletal:      Comments: Normal movement in all 4 extremities   Skin:     General: Skin is warm and dry.   Neurological:      Mental Status: He is alert.      Coordination: Coordination normal.   Psychiatric:         Mood and Affect: Mood normal.       Exam limited given patient's pain.  UA +protein, ketones, blood  Assessment/Plan:   Assessment    1. Dysuria  - POCT Urinalysis    2. Right flank pain  - CT-RENAL COLIC EVALUATION(A/P W/O); Future  Attempted to order CT scan today and unfortunately CT scan machine is down at our location, offered that we can order it at another imaging site today but he declines.  Given that we cannot get a CT scan in a timely manner patient would like to go to the emergency room at this time.  He will call his wife and have her take him there now although I did offer calling for transportation for him at this time.  " Patient is still very angry and continues to reiterate he needs pain medication.  My suspicion is high for kidney stone.  Discussed with patient all we would have to offer here is Toradol or ibuprofen or Tylenol and I also offered Zofran for his nausea and vomiting but he declines.  Patient has one episode of emesis during exam today.  Again offered Zofran but he declined stating he only wants pain medications.  Patient still cursing at myself and staff throughout visit today.  Please note that this dictation was created using voice recognition software. I have made every reasonable attempt to correct obvious errors, but I expect that there are errors of grammar and possibly content that I did not discover before finalizing the note.    Dominick Cerda PA-C

## 2020-12-15 NOTE — ED NOTES
Prashant Hewitt discharged via ambulation with self.  Discharge instructions given and reviewed, patient educated to follow up with urology, verbalized understanding.  Prescriptions given x 2 for electronic pickup, verbalized understanding.  All personal belongings in possession.  No questions at this time.

## 2020-12-15 NOTE — ED NOTES
Patient medicated per ERP order, see MAR.  Patient resting on gurney, updated on POC, verbalized understanding.

## 2021-02-09 ENCOUNTER — HOSPITAL ENCOUNTER (OUTPATIENT)
Dept: LAB | Facility: MEDICAL CENTER | Age: 61
End: 2021-02-09
Attending: INTERNAL MEDICINE
Payer: COMMERCIAL

## 2021-02-09 LAB
ALBUMIN SERPL BCP-MCNC: 4.4 G/DL (ref 3.2–4.9)
ALBUMIN/GLOB SERPL: 1.9 G/DL
ALP SERPL-CCNC: 73 U/L (ref 30–99)
ALT SERPL-CCNC: 20 U/L (ref 2–50)
ANION GAP SERPL CALC-SCNC: 10 MMOL/L (ref 7–16)
AST SERPL-CCNC: 13 U/L (ref 12–45)
BASOPHILS # BLD AUTO: 0.5 % (ref 0–1.8)
BASOPHILS # BLD: 0.02 K/UL (ref 0–0.12)
BILIRUB SERPL-MCNC: 0.4 MG/DL (ref 0.1–1.5)
BUN SERPL-MCNC: 13 MG/DL (ref 8–22)
CALCIUM SERPL-MCNC: 9.1 MG/DL (ref 8.5–10.5)
CHLORIDE SERPL-SCNC: 104 MMOL/L (ref 96–112)
CO2 SERPL-SCNC: 25 MMOL/L (ref 20–33)
CREAT SERPL-MCNC: 0.95 MG/DL (ref 0.5–1.4)
EOSINOPHIL # BLD AUTO: 0.24 K/UL (ref 0–0.51)
EOSINOPHIL NFR BLD: 5.7 % (ref 0–6.9)
ERYTHROCYTE [DISTWIDTH] IN BLOOD BY AUTOMATED COUNT: 49.4 FL (ref 35.9–50)
FASTING STATUS PATIENT QL REPORTED: NORMAL
GLOBULIN SER CALC-MCNC: 2.3 G/DL (ref 1.9–3.5)
GLUCOSE SERPL-MCNC: 113 MG/DL (ref 65–99)
HCT VFR BLD AUTO: 43.1 % (ref 42–52)
HGB BLD-MCNC: 14.2 G/DL (ref 14–18)
IMM GRANULOCYTES # BLD AUTO: 0.02 K/UL (ref 0–0.11)
IMM GRANULOCYTES NFR BLD AUTO: 0.5 % (ref 0–0.9)
LYMPHOCYTES # BLD AUTO: 1.08 K/UL (ref 1–4.8)
LYMPHOCYTES NFR BLD: 25.5 % (ref 22–41)
MCH RBC QN AUTO: 31.8 PG (ref 27–33)
MCHC RBC AUTO-ENTMCNC: 32.9 G/DL (ref 33.7–35.3)
MCV RBC AUTO: 96.6 FL (ref 81.4–97.8)
MONOCYTES # BLD AUTO: 0.34 K/UL (ref 0–0.85)
MONOCYTES NFR BLD AUTO: 8 % (ref 0–13.4)
NEUTROPHILS # BLD AUTO: 2.53 K/UL (ref 1.82–7.42)
NEUTROPHILS NFR BLD: 59.8 % (ref 44–72)
NRBC # BLD AUTO: 0 K/UL
NRBC BLD-RTO: 0 /100 WBC
PLATELET # BLD AUTO: 196 K/UL (ref 164–446)
PMV BLD AUTO: 10.7 FL (ref 9–12.9)
POTASSIUM SERPL-SCNC: 3.8 MMOL/L (ref 3.6–5.5)
PROT SERPL-MCNC: 6.7 G/DL (ref 6–8.2)
RBC # BLD AUTO: 4.46 M/UL (ref 4.7–6.1)
SODIUM SERPL-SCNC: 139 MMOL/L (ref 135–145)
WBC # BLD AUTO: 4.2 K/UL (ref 4.8–10.8)

## 2021-02-09 PROCEDURE — 85025 COMPLETE CBC W/AUTO DIFF WBC: CPT

## 2021-02-09 PROCEDURE — 36415 COLL VENOUS BLD VENIPUNCTURE: CPT

## 2021-02-09 PROCEDURE — 80053 COMPREHEN METABOLIC PANEL: CPT

## 2021-03-15 DIAGNOSIS — Z23 NEED FOR VACCINATION: ICD-10-CM

## 2021-08-09 ENCOUNTER — HOSPITAL ENCOUNTER (OUTPATIENT)
Dept: LAB | Facility: MEDICAL CENTER | Age: 61
End: 2021-08-09
Attending: INTERNAL MEDICINE
Payer: COMMERCIAL

## 2021-08-09 LAB
ALBUMIN SERPL BCP-MCNC: 4.3 G/DL (ref 3.2–4.9)
ALBUMIN/GLOB SERPL: 1.8 G/DL
ALP SERPL-CCNC: 85 U/L (ref 30–99)
ALT SERPL-CCNC: 15 U/L (ref 2–50)
ANION GAP SERPL CALC-SCNC: 12 MMOL/L (ref 7–16)
AST SERPL-CCNC: 15 U/L (ref 12–45)
BASOPHILS # BLD AUTO: 0.2 % (ref 0–1.8)
BASOPHILS # BLD: 0.01 K/UL (ref 0–0.12)
BILIRUB SERPL-MCNC: 0.4 MG/DL (ref 0.1–1.5)
BUN SERPL-MCNC: 9 MG/DL (ref 8–22)
CALCIUM SERPL-MCNC: 9.2 MG/DL (ref 8.5–10.5)
CHLORIDE SERPL-SCNC: 105 MMOL/L (ref 96–112)
CO2 SERPL-SCNC: 24 MMOL/L (ref 20–33)
CREAT SERPL-MCNC: 0.88 MG/DL (ref 0.5–1.4)
EOSINOPHIL # BLD AUTO: 0.35 K/UL (ref 0–0.51)
EOSINOPHIL NFR BLD: 8 % (ref 0–6.9)
ERYTHROCYTE [DISTWIDTH] IN BLOOD BY AUTOMATED COUNT: 50.2 FL (ref 35.9–50)
GLOBULIN SER CALC-MCNC: 2.4 G/DL (ref 1.9–3.5)
GLUCOSE SERPL-MCNC: 97 MG/DL (ref 65–99)
HCT VFR BLD AUTO: 42 % (ref 42–52)
HGB BLD-MCNC: 14.3 G/DL (ref 14–18)
IMM GRANULOCYTES # BLD AUTO: 0 K/UL (ref 0–0.11)
IMM GRANULOCYTES NFR BLD AUTO: 0 % (ref 0–0.9)
LYMPHOCYTES # BLD AUTO: 0.92 K/UL (ref 1–4.8)
LYMPHOCYTES NFR BLD: 21.1 % (ref 22–41)
MCH RBC QN AUTO: 32.2 PG (ref 27–33)
MCHC RBC AUTO-ENTMCNC: 34 G/DL (ref 33.7–35.3)
MCV RBC AUTO: 94.6 FL (ref 81.4–97.8)
MONOCYTES # BLD AUTO: 0.32 K/UL (ref 0–0.85)
MONOCYTES NFR BLD AUTO: 7.3 % (ref 0–13.4)
NEUTROPHILS # BLD AUTO: 2.76 K/UL (ref 1.82–7.42)
NEUTROPHILS NFR BLD: 63.4 % (ref 44–72)
NRBC # BLD AUTO: 0 K/UL
NRBC BLD-RTO: 0 /100 WBC
PLATELET # BLD AUTO: 201 K/UL (ref 164–446)
PMV BLD AUTO: 10.6 FL (ref 9–12.9)
POTASSIUM SERPL-SCNC: 3.9 MMOL/L (ref 3.6–5.5)
PROT SERPL-MCNC: 6.7 G/DL (ref 6–8.2)
RBC # BLD AUTO: 4.44 M/UL (ref 4.7–6.1)
SODIUM SERPL-SCNC: 141 MMOL/L (ref 135–145)
WBC # BLD AUTO: 4.4 K/UL (ref 4.8–10.8)

## 2021-08-09 PROCEDURE — 36415 COLL VENOUS BLD VENIPUNCTURE: CPT

## 2021-08-09 PROCEDURE — 85025 COMPLETE CBC W/AUTO DIFF WBC: CPT

## 2021-08-09 PROCEDURE — 80053 COMPREHEN METABOLIC PANEL: CPT

## 2021-08-16 ENCOUNTER — HOSPITAL ENCOUNTER (OUTPATIENT)
Dept: RADIOLOGY | Facility: MEDICAL CENTER | Age: 61
End: 2021-08-16
Attending: INTERNAL MEDICINE
Payer: COMMERCIAL

## 2021-08-16 ENCOUNTER — SLEEP CENTER VISIT (OUTPATIENT)
Dept: SLEEP MEDICINE | Facility: MEDICAL CENTER | Age: 61
End: 2021-08-16
Payer: COMMERCIAL

## 2021-08-16 VITALS
HEART RATE: 63 BPM | OXYGEN SATURATION: 97 % | WEIGHT: 184 LBS | BODY MASS INDEX: 28.88 KG/M2 | SYSTOLIC BLOOD PRESSURE: 130 MMHG | RESPIRATION RATE: 16 BRPM | DIASTOLIC BLOOD PRESSURE: 86 MMHG | HEIGHT: 67 IN

## 2021-08-16 DIAGNOSIS — C49.A9 GASTROINTESTINAL STROMAL TUMOR OF OTHER SITES (HCC): ICD-10-CM

## 2021-08-16 DIAGNOSIS — C49.A2 MALIGNANT GASTRIC STROMAL TUMOR (HCC): ICD-10-CM

## 2021-08-16 DIAGNOSIS — J45.40 MODERATE PERSISTENT ASTHMA WITHOUT COMPLICATION: ICD-10-CM

## 2021-08-16 DIAGNOSIS — R93.89 ABNORMAL CHEST CT: ICD-10-CM

## 2021-08-16 PROCEDURE — 700117 HCHG RX CONTRAST REV CODE 255: Performed by: INTERNAL MEDICINE

## 2021-08-16 PROCEDURE — 99214 OFFICE O/P EST MOD 30 MIN: CPT | Performed by: PHYSICIAN ASSISTANT

## 2021-08-16 PROCEDURE — 71260 CT THORAX DX C+: CPT

## 2021-08-16 RX ORDER — ACLIDINIUM BROMIDE 400 UG/1
1 POWDER, METERED RESPIRATORY (INHALATION) 2 TIMES DAILY
Qty: 3 EACH | Refills: 3 | Status: SHIPPED | OUTPATIENT
Start: 2021-08-16 | End: 2022-11-15 | Stop reason: SDUPTHER

## 2021-08-16 RX ORDER — ALBUTEROL SULFATE 90 UG/1
1 AEROSOL, METERED RESPIRATORY (INHALATION) EVERY 6 HOURS PRN
Qty: 2 EACH | Refills: 3 | Status: SHIPPED | OUTPATIENT
Start: 2021-08-16 | End: 2022-11-15 | Stop reason: SDUPTHER

## 2021-08-16 RX ADMIN — IOHEXOL 100 ML: 350 INJECTION, SOLUTION INTRAVENOUS at 10:45

## 2021-08-16 RX ADMIN — IOHEXOL 25 ML: 240 INJECTION, SOLUTION INTRATHECAL; INTRAVASCULAR; INTRAVENOUS; ORAL at 10:45

## 2021-08-16 ASSESSMENT — ENCOUNTER SYMPTOMS
SINUS PAIN: 0
PALPITATIONS: 0
TREMORS: 0
SHORTNESS OF BREATH: 1
HEARTBURN: 0
ORTHOPNEA: 0
FEVER: 0
HEADACHES: 0
CHILLS: 0
SPUTUM PRODUCTION: 0
INSOMNIA: 0
COUGH: 0
SORE THROAT: 0
WEIGHT LOSS: 0
DIZZINESS: 0
WHEEZING: 1

## 2021-08-16 ASSESSMENT — FIBROSIS 4 INDEX: FIB4 SCORE: 1.16

## 2021-08-16 NOTE — PROGRESS NOTES
CC: Follow-up with refills    HPI:  Prashant Hewitt is a 60 y.o. year old male here today for follow-up on moderate persistent asthma. Last seen in clinic 12/5/2019 by Dr. Easley.  Patient is a never smoker.    Pertinent past medical history includes gastrointestinal stromal tumor of stomach 2019, GERD, multiple nodules of the lung.    Reviewed in clinic vitals including /86, HR 63, O2 sat 97 and BMI of 28.82 kg/m².    Reviewed home medication regimen including Tudorza, Advair, Ventolin.     Reviewed most recent imaging including CT chest obtained 8/16/2021 demonstrating nonocclusive thrombus left internal jugular vein, atherosclerotic plaque aorta, coronary calcification seen, no significant lymphadenopathy, mild biapical scarring, nodules in the anterior left lower lobe decreased in size, largest now measures 1 x 1 x 0.6 cm, mild groundglass opacity medial left lower lobe with 4.5 mm nodule, bilateral calcified granulomata, stable pleural-based opacity in the lingula, cluster small nodules medial right middle lobe unchanged.     Previous CT scan obtained 6/29/2020 demonstrating enlarging clustered nodules left lower lobe, dominant bilobed nodule measuring 2.3 x 1.2 cm previously 1.4 x 0.8 cm, scattered hazy groundglass opacities right upper lobe, tree-in-bud nodules in the right lower lobe similar to prior.  Patient did undergo a lung biopsy 7/21/2020 which was negative.    Most recent pulmonary function testing include spirometry obtained 12/5/2019 demonstrating FEV1 of 1.71 L or 52% predicted, FVC of 3.31 L or 76% predicted, FEV1/FVC ratio 52, no significant postbronchodilator response. Per pulmonologist interpretation moderately severe obstructive ventilatory defect.      Review of Systems   Constitutional: Positive for malaise/fatigue (since on gleevk ). Negative for chills, fever and weight loss.   HENT: Positive for hearing loss and tinnitus (constant ). Negative for congestion, nosebleeds, sinus pain  and sore throat.    Eyes:        Presc glasses   Respiratory: Positive for shortness of breath (if off meds ) and wheezing (if off meds ). Negative for cough and sputum production.    Cardiovascular: Negative for chest pain, palpitations, orthopnea and leg swelling.   Gastrointestinal: Negative for heartburn.        Missing teeth, no swallowing difficulty    Neurological: Negative for dizziness, tremors and headaches.   Psychiatric/Behavioral: The patient does not have insomnia.        Past Medical History:   Diagnosis Date   • Asthma without status asthmaticus 5/22/2018   • Cancer (HCC) 2017    gist   • Chickenpox    • Gastrointestinal stromal tumor of stomach (HCC) 4/4/2017 March 2017 18 cm GIST tumor removed Dr. Jin, Dr. Isabel (surgeon)    • GERD (gastroesophageal reflux disease)    • Mumps    • Pneumonia     2017   • Snoring        Past Surgical History:   Procedure Laterality Date   • PB UPPER GI ENDOSCOPY,BIOPSY  7/16/2019    Procedure: GASTROSCOPY, WITH BIOPSY;  Surgeon: Zhou Miller M.D.;  Location: [facility];  Service: EUS   • GASTROSCOPY  7/16/2019    Procedure: GASTROSCOPY;  Surgeon: Zhou Miller M.D.;  Location: [facility];  Service: EUS   • EGD W/ENDOSCOPIC ULTRASOUND  7/16/2019    Procedure: EGD, WITH ENDOSCOPIC US - UPPER CURVILINEAR;  Surgeon: Zhou Miller M.D.;  Location: [facility];  Service: EUS   • EGD WITH ASP/BX  7/16/2019    Procedure: EGD, WITH ASPIRATION BIOPSY;  Surgeon: Zhou Miller M.D.;  Location: [facility];  Service: EUS   • CHOLECYSTECTOMY     • FINGER AMPUTATION Right     right index accident when 3 yo   • LAPAROTOMY     • OTHER      GIST removal 3/2017   • SEPTOPLASTY     • SINUSCOPE         Family History   Problem Relation Age of Onset   • Hypertension Mother    • Other Mother         glaucoma    • Cancer Father         pancreatic    • Heart Disease Father    • Hypertension Brother        Social History  "    Socioeconomic History   • Marital status:      Spouse name: Not on file   • Number of children: Not on file   • Years of education: Not on file   • Highest education level: Not on file   Occupational History   • Not on file   Tobacco Use   • Smoking status: Passive Smoke Exposure - Never Smoker   • Smokeless tobacco: Never Used   Vaping Use   • Vaping Use: Never used   Substance and Sexual Activity   • Alcohol use: Yes     Comment: 1-2 PER WEEK   • Drug use: No   • Sexual activity: Not on file   Other Topics Concern   • Not on file   Social History Narrative    ; owns own business      Social Determinants of Health     Financial Resource Strain:    • Difficulty of Paying Living Expenses:    Food Insecurity:    • Worried About Running Out of Food in the Last Year:    • Ran Out of Food in the Last Year:    Transportation Needs:    • Lack of Transportation (Medical):    • Lack of Transportation (Non-Medical):    Physical Activity:    • Days of Exercise per Week:    • Minutes of Exercise per Session:    Stress:    • Feeling of Stress :    Social Connections:    • Frequency of Communication with Friends and Family:    • Frequency of Social Gatherings with Friends and Family:    • Attends [employer] Services:    • Active Member of Clubs or Organizations:    • Attends Club or Organization Meetings:    • Marital Status:    Intimate Partner Violence:    • Fear of Current or Ex-Partner:    • Emotionally Abused:    • Physically Abused:    • Sexually Abused:        Allergies as of 08/16/2021 - Reviewed 12/14/2020   Allergen Reaction Noted   • Penicillins Rash    • Succinylcholine Unspecified    • Epinephrine Unspecified 07/10/2019        @Vital signs for this encounter:  Vitals:    08/16/21 1351   Height: 1.702 m (5' 7\")   Weight: 83.5 kg (184 lb)   Weight % change since last entry.: 0 %   BP: 130/86   Pulse: 63   BMI (Calculated): 28.82   Resp: 16       Current medications as of today "   Current Outpatient Medications   Medication Sig Dispense Refill   • Aclidinium Bromide (TUDORZA PRESSAIR) 400 MCG/ACT AEROSOL POWDER, BREATH ACTIVATED Take 1 Inhalation by mouth 2 Times a Day. 1 Each 5   • fluticasone-salmeterol (ADVAIR) 500-50 MCG/DOSE AEROSOL POWDER, BREATH ACTIVATED INHALE ONE PUFF BY MOUTH TWICE A DAY RINSE MOUTH AFTER EACH USE 1 Each 3   • albuterol (VENTOLIN HFA) 108 (90 Base) MCG/ACT Aero Soln inhalation aerosol Inhale 1 Puff by mouth every 6 hours as needed for Shortness of Breath. 1 Inhaler 1   • albuterol (PROVENTIL) 2.5mg/3ml Nebu Soln solution for nebulization albuterol sulfate 2.5 mg/3 mL (0.083 %) solution for nebulization     • imatinib (GLEEVEC) 400 MG tablet imatinib 400 mg tablet     • tamsulosin (FLOMAX) 0.4 MG capsule Take 1 Cap by mouth every day. (Patient not taking: Reported on 8/16/2021) 30 Cap 0   • Aclidinium Bromide 400 MCG/ACT AEROSOL POWDER, BREATH ACTIVATED Inhale 1 Puff by mouth every day.     • sildenafil citrate (VIAGRA) 100 MG tablet Take 0.5 Tabs by mouth 1 time daily as needed for Erectile Dysfunction. 30 Tab 2     No current facility-administered medications for this visit.         Physical Exam:   Gen:           Alert and oriented, No apparent distress. Mood and affect appropriate, normal interaction with provider.  Eyes:          sclere white, conjunctive moist.  Hearing:     Grossly intact.  Dentition:    Fair dentition.  Oropharynx:   Tongue normal, posterior pharynx without erythema or exudate.  Neck:        Supple, trachea midline, no masses.  Respiratory Effort: No intercostal retractions or use of accessory muscles.   Lung Auscultation:      Decreased; no rales, rhonchi or wheezing.  CV:            Regular rate and rhythm. No edema. No murmurs, rubs or gallops.  Digits, Nails, Ext: No clubbing, cyanosis, petechiae, or nodes.   Skin:        No rashes, lesions or ulcers noted on back or exposed skin surfaces.                     Assessment:  1. Moderate  persistent asthma without complication  Aclidinium Bromide (TUDORZA PRESSAIR) 400 MCG/ACT AEROSOL POWDER, BREATH ACTIVATED    fluticasone-salmeterol (ADVAIR) 500-50 MCG/DOSE AEROSOL POWDER, BREATH ACTIVATED    albuterol (VENTOLIN HFA) 108 (90 Base) MCG/ACT Aero Soln inhalation aerosol   2. Abnormal chest CT         Immunizations:    Flu: 8/13/2021  Pneumovax 23: 12/5/2019  Prevnar 13: 8/29/2018  Moderna SARS-CoV-2 vaccine: 8/13/2021    Plan:    60 y.o. year old male here today for follow-up on moderate persistent asthma. Last seen in clinic 12/5/2019 by Dr. Easley.  Patient is a never smoker.    Pertinent past medical history includes gastrointestinal stromal tumor of stomach 2019, GERD, multiple nodules of the lung.    Reviewed in clinic vitals including /86, HR 63, O2 sat 97 and BMI of 28.82 kg/m².    Reviewed home medication regimen including Tudorza, Advair, Ventolin.    Moderate persistent asthma: Patient does continue to use maintenance inhalers, and occasional Ventolin and benefit from current regimen.  Refills provided.    Reviewed most recent imaging including CT chest obtained 8/16/2021 demonstrating nonocclusive thrombus left internal jugular vein, atherosclerotic plaque aorta, coronary calcification seen, no significant lymphadenopathy, mild biapical scarring, nodules in the anterior left lower lobe decreased in size, largest now measures 1 x 1 x 0.6 cm, mild groundglass opacity medial left lower lobe with 4.5 mm nodule, bilateral calcified granulomata, stable pleural-based opacity in the lingula, cluster small nodules medial right middle lobe unchanged.     Abnormal chest CT: Continue to monitor GERD and history of GI stromal tumor, biopsy negative in July 2020.  Follow-up imaging per oncology.    Had first Covid vaccine, second pending.  We did discuss 6-month follow-up but will review with pulmonologist for recommendations.      This dictation was created using voice recognition software. The  accuracy of the dictation is limited to the abilities of the software. I expect there may be some errors of grammar and possibly content.

## 2021-08-16 NOTE — PATIENT INSTRUCTIONS
1-had first covid vaccine, second pending  2-refill on meds  3-reviewed CT chest, nodules are decreased to same  4-follow up CT scan in 6 months   5-follow up appt in 6 months

## 2021-08-17 ENCOUNTER — HOSPITAL ENCOUNTER (OUTPATIENT)
Dept: RADIOLOGY | Facility: MEDICAL CENTER | Age: 61
End: 2021-08-17
Attending: INTERNAL MEDICINE
Payer: COMMERCIAL

## 2021-08-17 DIAGNOSIS — C49.A9 GASTROINTESTINAL STROMAL TUMOR OF OTHER SITES (HCC): ICD-10-CM

## 2021-08-17 DIAGNOSIS — C49.A2 MALIGNANT GASTRIC STROMAL TUMOR (HCC): ICD-10-CM

## 2021-08-17 PROCEDURE — 93971 EXTREMITY STUDY: CPT | Mod: LT

## 2021-10-22 ENCOUNTER — TELEPHONE (OUTPATIENT)
Dept: SLEEP MEDICINE | Facility: MEDICAL CENTER | Age: 61
End: 2021-10-22

## 2021-10-22 NOTE — TELEPHONE ENCOUNTER
MEDICATION PRIOR AUTHORIZATION NEEDED:    1. Name of Medication: Tudorza 400 mcg    2. Requested By (Name of Pharmacy): [facility]     3. Is insurance on file current? yes    4. What is the name & phone number of the 3rd party payor? Daniel   normal

## 2021-10-25 NOTE — TELEPHONE ENCOUNTER
DOCUMENTATION OF PRIOR AUTH STATUS    1. Medication name and dose: Tudorza Pressair 400 mcg    2. Name and Phone # of Prescription coverage company: [employer]    3. Date Prior Auth was submitted: 10/17/2021    4. What information was given to obtain insurance decision: Clinical notes    5. Prior Auth letter Approved or Denied: Approved through 10/20/2022    6. Pharmacy notified: Yes    7. Patient notified: Yes

## 2021-10-27 ENCOUNTER — TELEPHONE (OUTPATIENT)
Dept: SLEEP MEDICINE | Facility: MEDICAL CENTER | Age: 61
End: 2021-10-27

## 2021-11-08 ENCOUNTER — HOSPITAL ENCOUNTER (OUTPATIENT)
Dept: LAB | Facility: MEDICAL CENTER | Age: 61
End: 2021-11-08
Attending: INTERNAL MEDICINE
Payer: COMMERCIAL

## 2021-11-08 LAB
ALBUMIN SERPL BCP-MCNC: 4.3 G/DL (ref 3.2–4.9)
ALBUMIN/GLOB SERPL: 2 G/DL
ALP SERPL-CCNC: 69 U/L (ref 30–99)
ALT SERPL-CCNC: 13 U/L (ref 2–50)
ANION GAP SERPL CALC-SCNC: 8 MMOL/L (ref 7–16)
AST SERPL-CCNC: 8 U/L (ref 12–45)
BASOPHILS # BLD AUTO: 0.4 % (ref 0–1.8)
BASOPHILS # BLD: 0.02 K/UL (ref 0–0.12)
BILIRUB SERPL-MCNC: 0.7 MG/DL (ref 0.1–1.5)
BUN SERPL-MCNC: 12 MG/DL (ref 8–22)
CALCIUM SERPL-MCNC: 8.9 MG/DL (ref 8.5–10.5)
CHLORIDE SERPL-SCNC: 107 MMOL/L (ref 96–112)
CO2 SERPL-SCNC: 25 MMOL/L (ref 20–33)
CREAT SERPL-MCNC: 0.95 MG/DL (ref 0.5–1.4)
EOSINOPHIL # BLD AUTO: 0.43 K/UL (ref 0–0.51)
EOSINOPHIL NFR BLD: 9.1 % (ref 0–6.9)
ERYTHROCYTE [DISTWIDTH] IN BLOOD BY AUTOMATED COUNT: 48.9 FL (ref 35.9–50)
GLOBULIN SER CALC-MCNC: 2.2 G/DL (ref 1.9–3.5)
GLUCOSE SERPL-MCNC: 99 MG/DL (ref 65–99)
HCT VFR BLD AUTO: 41.3 % (ref 42–52)
HGB BLD-MCNC: 14.2 G/DL (ref 14–18)
IMM GRANULOCYTES # BLD AUTO: 0.01 K/UL (ref 0–0.11)
IMM GRANULOCYTES NFR BLD AUTO: 0.2 % (ref 0–0.9)
LYMPHOCYTES # BLD AUTO: 1.13 K/UL (ref 1–4.8)
LYMPHOCYTES NFR BLD: 23.8 % (ref 22–41)
MCH RBC QN AUTO: 32.6 PG (ref 27–33)
MCHC RBC AUTO-ENTMCNC: 34.4 G/DL (ref 33.7–35.3)
MCV RBC AUTO: 94.9 FL (ref 81.4–97.8)
MONOCYTES # BLD AUTO: 0.45 K/UL (ref 0–0.85)
MONOCYTES NFR BLD AUTO: 9.5 % (ref 0–13.4)
NEUTROPHILS # BLD AUTO: 2.71 K/UL (ref 1.82–7.42)
NEUTROPHILS NFR BLD: 57 % (ref 44–72)
NRBC # BLD AUTO: 0 K/UL
NRBC BLD-RTO: 0 /100 WBC
PLATELET # BLD AUTO: 187 K/UL (ref 164–446)
PMV BLD AUTO: 10.6 FL (ref 9–12.9)
POTASSIUM SERPL-SCNC: 4.4 MMOL/L (ref 3.6–5.5)
PROT SERPL-MCNC: 6.5 G/DL (ref 6–8.2)
RBC # BLD AUTO: 4.35 M/UL (ref 4.7–6.1)
SODIUM SERPL-SCNC: 140 MMOL/L (ref 135–145)
WBC # BLD AUTO: 4.8 K/UL (ref 4.8–10.8)

## 2021-11-08 PROCEDURE — 85025 COMPLETE CBC W/AUTO DIFF WBC: CPT

## 2021-11-08 PROCEDURE — 80053 COMPREHEN METABOLIC PANEL: CPT

## 2021-11-08 PROCEDURE — 36415 COLL VENOUS BLD VENIPUNCTURE: CPT

## 2022-01-26 ENCOUNTER — TELEPHONE (OUTPATIENT)
Dept: SLEEP MEDICINE | Facility: MEDICAL CENTER | Age: 62
End: 2022-01-26

## 2022-01-26 NOTE — TELEPHONE ENCOUNTER
MEDICATION PRIOR AUTHORIZATION NEEDED:    1. Name of Medication: Tudorza Pressair 400 mcg    2. Requested By (Name of Pharmacy): [facility]     3. Is insurance on file current? yes    4. What is the name & phone number of the 3rd party payor? [employer]

## 2022-06-10 ENCOUNTER — HOSPITAL ENCOUNTER (OUTPATIENT)
Dept: LAB | Facility: MEDICAL CENTER | Age: 62
End: 2022-06-10
Attending: INTERNAL MEDICINE
Payer: COMMERCIAL

## 2022-06-10 LAB
ALBUMIN SERPL BCP-MCNC: 4.3 G/DL (ref 3.2–4.9)
ALBUMIN/GLOB SERPL: 1.7 G/DL
ALP SERPL-CCNC: 81 U/L (ref 30–99)
ALT SERPL-CCNC: 27 U/L (ref 2–50)
ANION GAP SERPL CALC-SCNC: 13 MMOL/L (ref 7–16)
AST SERPL-CCNC: 20 U/L (ref 12–45)
BASOPHILS # BLD AUTO: 0.2 % (ref 0–1.8)
BASOPHILS # BLD: 0.01 K/UL (ref 0–0.12)
BILIRUB SERPL-MCNC: 0.6 MG/DL (ref 0.1–1.5)
BUN SERPL-MCNC: 12 MG/DL (ref 8–22)
CALCIUM SERPL-MCNC: 9.1 MG/DL (ref 8.5–10.5)
CHLORIDE SERPL-SCNC: 105 MMOL/L (ref 96–112)
CO2 SERPL-SCNC: 23 MMOL/L (ref 20–33)
CREAT SERPL-MCNC: 1.09 MG/DL (ref 0.5–1.4)
EOSINOPHIL # BLD AUTO: 0.38 K/UL (ref 0–0.51)
EOSINOPHIL NFR BLD: 7.1 % (ref 0–6.9)
ERYTHROCYTE [DISTWIDTH] IN BLOOD BY AUTOMATED COUNT: 50.3 FL (ref 35.9–50)
GFR SERPLBLD CREATININE-BSD FMLA CKD-EPI: 77 ML/MIN/1.73 M 2
GLOBULIN SER CALC-MCNC: 2.5 G/DL (ref 1.9–3.5)
GLUCOSE SERPL-MCNC: 111 MG/DL (ref 65–99)
HCT VFR BLD AUTO: 44.7 % (ref 42–52)
HGB BLD-MCNC: 15 G/DL (ref 14–18)
IMM GRANULOCYTES # BLD AUTO: 0.02 K/UL (ref 0–0.11)
IMM GRANULOCYTES NFR BLD AUTO: 0.4 % (ref 0–0.9)
LYMPHOCYTES # BLD AUTO: 1.51 K/UL (ref 1–4.8)
LYMPHOCYTES NFR BLD: 28.3 % (ref 22–41)
MCH RBC QN AUTO: 31.8 PG (ref 27–33)
MCHC RBC AUTO-ENTMCNC: 33.6 G/DL (ref 33.7–35.3)
MCV RBC AUTO: 94.7 FL (ref 81.4–97.8)
MONOCYTES # BLD AUTO: 0.42 K/UL (ref 0–0.85)
MONOCYTES NFR BLD AUTO: 7.9 % (ref 0–13.4)
NEUTROPHILS # BLD AUTO: 2.99 K/UL (ref 1.82–7.42)
NEUTROPHILS NFR BLD: 56.1 % (ref 44–72)
NRBC # BLD AUTO: 0 K/UL
NRBC BLD-RTO: 0 /100 WBC
PLATELET # BLD AUTO: 203 K/UL (ref 164–446)
PMV BLD AUTO: 10.6 FL (ref 9–12.9)
POTASSIUM SERPL-SCNC: 4.2 MMOL/L (ref 3.6–5.5)
PROT SERPL-MCNC: 6.8 G/DL (ref 6–8.2)
RBC # BLD AUTO: 4.72 M/UL (ref 4.7–6.1)
SODIUM SERPL-SCNC: 141 MMOL/L (ref 135–145)
WBC # BLD AUTO: 5.3 K/UL (ref 4.8–10.8)

## 2022-06-10 PROCEDURE — 80053 COMPREHEN METABOLIC PANEL: CPT

## 2022-06-10 PROCEDURE — 36415 COLL VENOUS BLD VENIPUNCTURE: CPT

## 2022-06-10 PROCEDURE — 85025 COMPLETE CBC W/AUTO DIFF WBC: CPT

## 2022-06-13 ENCOUNTER — HOSPITAL ENCOUNTER (OUTPATIENT)
Dept: RADIOLOGY | Facility: MEDICAL CENTER | Age: 62
End: 2022-06-13
Attending: INTERNAL MEDICINE
Payer: COMMERCIAL

## 2022-06-13 DIAGNOSIS — R63.4 LOSS OF WEIGHT: ICD-10-CM

## 2022-06-13 DIAGNOSIS — C49.A9 GASTROINTESTINAL STROMAL TUMOR OF OTHER SITES (HCC): ICD-10-CM

## 2022-06-13 DIAGNOSIS — C49.A2 MALIGNANT GASTRIC STROMAL TUMOR (HCC): ICD-10-CM

## 2022-06-13 DIAGNOSIS — D64.9 ANEMIA, UNSPECIFIED TYPE: ICD-10-CM

## 2022-06-13 PROCEDURE — 71260 CT THORAX DX C+: CPT

## 2022-06-13 PROCEDURE — 700117 HCHG RX CONTRAST REV CODE 255: Performed by: INTERNAL MEDICINE

## 2022-06-13 RX ADMIN — IOHEXOL 25 ML: 240 INJECTION, SOLUTION INTRATHECAL; INTRAVASCULAR; INTRAVENOUS; ORAL at 19:30

## 2022-06-13 RX ADMIN — IOHEXOL 100 ML: 350 INJECTION, SOLUTION INTRAVENOUS at 19:30

## 2022-11-15 ENCOUNTER — OFFICE VISIT (OUTPATIENT)
Dept: SLEEP MEDICINE | Facility: MEDICAL CENTER | Age: 62
End: 2022-11-15
Payer: COMMERCIAL

## 2022-11-15 VITALS
SYSTOLIC BLOOD PRESSURE: 144 MMHG | OXYGEN SATURATION: 95 % | DIASTOLIC BLOOD PRESSURE: 80 MMHG | HEART RATE: 76 BPM | WEIGHT: 192.38 LBS | HEIGHT: 67 IN | BODY MASS INDEX: 30.19 KG/M2

## 2022-11-15 DIAGNOSIS — R91.8 MULTIPLE NODULES OF LUNG: ICD-10-CM

## 2022-11-15 DIAGNOSIS — J45.40 MODERATE PERSISTENT ASTHMA WITHOUT COMPLICATION: ICD-10-CM

## 2022-11-15 PROCEDURE — 99213 OFFICE O/P EST LOW 20 MIN: CPT | Performed by: INTERNAL MEDICINE

## 2022-11-15 RX ORDER — ALBUTEROL SULFATE 2.5 MG/3ML
2.5 SOLUTION RESPIRATORY (INHALATION) EVERY 6 HOURS PRN
Qty: 120 EACH | Refills: 0 | Status: SHIPPED | OUTPATIENT
Start: 2022-11-15 | End: 2024-01-17 | Stop reason: SDUPTHER

## 2022-11-15 RX ORDER — AZITHROMYCIN 250 MG/1
TABLET, FILM COATED ORAL
Qty: 6 TABLET | Refills: 0 | Status: SHIPPED
Start: 2022-11-15 | End: 2023-05-15

## 2022-11-15 RX ORDER — ALBUTEROL SULFATE 90 UG/1
1 AEROSOL, METERED RESPIRATORY (INHALATION) EVERY 6 HOURS PRN
Qty: 2 EACH | Refills: 3 | Status: SHIPPED | OUTPATIENT
Start: 2022-11-15 | End: 2024-01-17 | Stop reason: SDUPTHER

## 2022-11-15 RX ORDER — TIOTROPIUM BROMIDE INHALATION SPRAY 3.12 UG/1
5 SPRAY, METERED RESPIRATORY (INHALATION) DAILY
Qty: 1 EACH | Refills: 11 | Status: SHIPPED | OUTPATIENT
Start: 2022-11-15 | End: 2024-01-17 | Stop reason: SDUPTHER

## 2022-11-15 RX ORDER — FLUTICASONE PROPIONATE AND SALMETEROL XINAFOATE 230; 21 UG/1; UG/1
2 AEROSOL, METERED RESPIRATORY (INHALATION) 2 TIMES DAILY
Qty: 1 EACH | Refills: 11 | Status: SHIPPED | OUTPATIENT
Start: 2022-11-15 | End: 2024-01-17 | Stop reason: SDUPTHER

## 2022-11-15 ASSESSMENT — ENCOUNTER SYMPTOMS
MUSCULOSKELETAL NEGATIVE: 1
CARDIOVASCULAR NEGATIVE: 1
SHORTNESS OF BREATH: 1
NEUROLOGICAL NEGATIVE: 1
SPUTUM PRODUCTION: 1
GASTROINTESTINAL NEGATIVE: 1
PSYCHIATRIC NEGATIVE: 1
EYES NEGATIVE: 1
WHEEZING: 1
COUGH: 1

## 2022-11-15 ASSESSMENT — FIBROSIS 4 INDEX: FIB4 SCORE: 1.16

## 2022-11-15 ASSESSMENT — PATIENT HEALTH QUESTIONNAIRE - PHQ9: CLINICAL INTERPRETATION OF PHQ2 SCORE: 0

## 2022-11-15 NOTE — PROGRESS NOTES
Pulmonary Clinic follow up    Date of Service: 11/15/2022    Reason for follow up:  Asthma (Last seen 08/16/21), Other (CT-TAP 06/13/22), Cough (W/white phlegm x6days ), and Medication Refill (Albuter. Neb. )      Problem List Items Addressed This Visit       Multiple nodules of lung     Patient follows up with Dr. Pugh for his previous cancer history and does get a pan CT next is December 2022 for right middle lobe nodule is being followed last was June 2022.         Moderate persistent asthma without complication     Patient with moderate persistent asthma on Advair and Tudorza.  Patient has not followed up in a while in pulmonary clinic but appears to be doing well.  Currently with upper respiratory tract infection with left lower lobe bronchi suggesting of early pneumonia.  Z-Alrry given  Refilled patient's prescriptions were changed Advair Diskus to HFA due to insurance coverage and also changed her dosage to Spiriva due to insurance coverage.  Reviewed with patient if this does not work to let us know so we can proceed in trying to get approval for his other previously used inhalers.  Albuterol as needed.  Albuterol nebulizers backup.  Inhaler teaching reviewed with patient.    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  His next trip is going to be in the spring and he has requested doxycycline which we will fill prior to him going for his deer hunt         Relevant Medications    tiotropium (SPIRIVA RESPIMAT) 2.5 mcg/Act Aero Soln    albuterol (PROVENTIL) 2.5mg/3ml Nebu Soln solution for nebulization    albuterol (VENTOLIN HFA) 108 (90 Base) MCG/ACT Aero Soln inhalation aerosol    fluticasone-salmeterol (ADVAIR HFA) 230-21 MCG/ACT inhaler    Other Relevant Orders    CULTURE RESPIRATORY W/ [facility] STN         History of Present Illness: Prashant Hewitt is a 61  y.o. male with a past medical history of moderate persistent asthma on Advair and Tudorza.  Never smoker.  History of gastrointestinal stromal tumor of stomach 2019 followed by Dr. Pugh, right middle lobe nodule last CT June 2022 and reflux.  Seen last by Darby Cheung August 2021.  Last pulmonary function testing done 2019 showed FEV1 1.71 L [52%], ratio 52% with no bronchodilator response.  No recent exacerbations  No ER visits  Uses albuterol every morning as that is how he is always done it prior to him using his Advair and his Tudorza  Coughing with thick and bubbly  Recent tear takes and had to get doxycycline in the [place]    Review of Systems   Constitutional:  Positive for malaise/fatigue.   HENT: Negative.     Eyes: Negative.    Respiratory:  Positive for cough, sputum production, shortness of breath and wheezing.    Cardiovascular: Negative.    Gastrointestinal: Negative.    Genitourinary: Negative.    Musculoskeletal: Negative.    Skin: Negative.    Neurological: Negative.    Endo/Heme/Allergies: Negative.    Psychiatric/Behavioral: Negative.       Current Outpatient Medications on File Prior to Visit   Medication Sig Dispense Refill    sildenafil citrate (VIAGRA) 100 MG tablet Take 0.5 Tabs by mouth 1 time daily as needed for Erectile Dysfunction. 30 Tab 2     No current facility-administered medications on file prior to visit.       Social History     Tobacco Use    Smoking status: Passive Smoke Exposure - Never Smoker    Smokeless tobacco: Never   Vaping Use    Vaping Use: Never used   Substance Use Topics    Alcohol use: Yes     Comment: 1-2 PER WEEK    Drug use: No        Past Medical History:   Diagnosis Date    Asthma without status asthmaticus 5/22/2018    Cancer (HCC) 2017    gist    Chickenpox     Gastrointestinal stromal tumor of stomach (HCC) 4/4/2017 March 2017 18 cm GIST tumor removed Dr. Jin, Dr. Isabel (surgeon)     GERD (gastroesophageal reflux disease)     Mumps     Pneumonia   "   2017    Snoring        Past Surgical History:   Procedure Laterality Date    MO UPPER GI ENDOSCOPY,BIOPSY  7/16/2019    Procedure: GASTROSCOPY, WITH BIOPSY;  Surgeon: Zhou Miller M.D.;  Location: [facility];  Service: EUS    GASTROSCOPY  7/16/2019    Procedure: GASTROSCOPY;  Surgeon: Zhou Miller M.D.;  Location: [facility];  Service: EUS    EGD W/ENDOSCOPIC ULTRASOUND  7/16/2019    Procedure: EGD, WITH ENDOSCOPIC US - UPPER CURVILINEAR;  Surgeon: Zhou Miller M.D.;  Location: [facility];  Service: EUS    EGD WITH ASP/BX  7/16/2019    Procedure: EGD, WITH ASPIRATION BIOPSY;  Surgeon: Zhou Miller M.D.;  Location: [facility];  Service: EUS    CHOLECYSTECTOMY      FINGER AMPUTATION Right     right index accident when 5 yo    LAPAROTOMY      OTHER      GIST removal 3/2017    SEPTOPLASTY      SINUSCOPE         Allergies: Penicillins, Succinylcholine, and Epinephrine    Family History   Problem Relation Age of Onset    Hypertension Mother     Other Mother         glaucoma     Cancer Father         pancreatic     Heart Disease Father     Hypertension Brother        Vitals:    11/15/22 0836   Height: 1.702 m (5' 7\")   Weight: 87.3 kg (192 lb 6 oz)   Weight % change since last entry.: 0 %   BP: (!) 144/80   Pulse: 76   BMI (Calculated): 30.13       Physical Examination  Physical Exam  Constitutional:       Appearance: He is obese.   HENT:      Head: Normocephalic and atraumatic.      Nose: Nose normal.   Eyes:      Extraocular Movements: Extraocular movements intact.      Pupils: Pupils are equal, round, and reactive to light.   Cardiovascular:      Rate and Rhythm: Normal rate and regular rhythm.   Pulmonary:      Breath sounds: Rhonchi present.      Comments: Left lower lobe rhonchi  Musculoskeletal:         General: Normal range of motion.      Cervical back: Normal range of motion.   Skin:     General: Skin is warm and dry.   Neurological:      " General: No focal deficit present.      Mental Status: He is oriented to person, place, and time.   Psychiatric:         Mood and Affect: Mood normal.         Behavior: Behavior normal.         Thought Content: Thought content normal.         Judgment: Judgment normal.              Frances Mckeon M.D., MD MPH [facility]  [facility]

## 2022-11-15 NOTE — ASSESSMENT & PLAN NOTE
Patient follows up with Dr. Pugh for his previous cancer history and does get a pan CT next is December 2022 for right middle lobe nodule is being followed last was June 2022.

## 2022-11-15 NOTE — ASSESSMENT & PLAN NOTE
Patient with moderate persistent asthma on Advair and Tudorza.  Patient has not followed up in a while in pulmonary clinic but appears to be doing well.  Currently with upper respiratory tract infection with left lower lobe bronchi suggesting of early pneumonia.  Z-Larry given  Refilled patient's prescriptions were changed Advair Diskus to HFA due to insurance coverage and also changed her dosage to Spiriva due to insurance coverage.  Reviewed with patient if this does not work to let us know so we can proceed in trying to get approval for his other previously used inhalers.  Albuterol as needed.  Albuterol nebulizers backup.  Inhaler teaching reviewed with patient.    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  His next trip is going to be in the spring and he has requested doxycycline which we will fill prior to him going for his deer hunt

## 2022-11-16 ENCOUNTER — HOSPITAL ENCOUNTER (OUTPATIENT)
Facility: MEDICAL CENTER | Age: 62
End: 2022-11-16
Attending: INTERNAL MEDICINE
Payer: COMMERCIAL

## 2022-11-16 DIAGNOSIS — J45.40 MODERATE PERSISTENT ASTHMA WITHOUT COMPLICATION: ICD-10-CM

## 2022-11-16 LAB
GRAM STN SPEC: NORMAL
SIGNIFICANT IND 70042: NORMAL
SITE SITE: NORMAL
SOURCE SOURCE: NORMAL

## 2022-11-16 PROCEDURE — 87205 SMEAR GRAM STAIN: CPT

## 2022-11-16 PROCEDURE — 87070 CULTURE OTHR SPECIMN AEROBIC: CPT

## 2022-11-18 ENCOUNTER — HOSPITAL ENCOUNTER (OUTPATIENT)
Dept: RADIOLOGY | Facility: MEDICAL CENTER | Age: 62
End: 2022-11-18
Attending: INTERNAL MEDICINE
Payer: COMMERCIAL

## 2022-11-18 DIAGNOSIS — C49.A9 GASTROINTESTINAL STROMAL TUMOR OF OTHER SITES (HCC): ICD-10-CM

## 2022-11-18 DIAGNOSIS — C49.A2 MALIGNANT GASTRIC STROMAL TUMOR (HCC): ICD-10-CM

## 2022-11-18 DIAGNOSIS — R63.4 LOSS OF WEIGHT: ICD-10-CM

## 2022-11-18 DIAGNOSIS — D64.9 ANEMIA, UNSPECIFIED TYPE: ICD-10-CM

## 2022-11-18 LAB
BACTERIA SPEC RESP CULT: NORMAL
GRAM STN SPEC: NORMAL
SIGNIFICANT IND 70042: NORMAL
SITE SITE: NORMAL
SOURCE SOURCE: NORMAL

## 2022-11-18 PROCEDURE — 71250 CT THORAX DX C-: CPT

## 2022-11-18 PROCEDURE — 700117 HCHG RX CONTRAST REV CODE 255: Performed by: INTERNAL MEDICINE

## 2022-11-18 RX ADMIN — IOHEXOL 20 ML: 240 INJECTION, SOLUTION INTRATHECAL; INTRAVASCULAR; INTRAVENOUS; ORAL at 16:00

## 2022-11-22 ENCOUNTER — OFFICE VISIT (OUTPATIENT)
Dept: MEDICAL GROUP | Facility: PHYSICIAN GROUP | Age: 62
End: 2022-11-22
Payer: COMMERCIAL

## 2022-11-22 VITALS
WEIGHT: 194.6 LBS | OXYGEN SATURATION: 97 % | DIASTOLIC BLOOD PRESSURE: 78 MMHG | TEMPERATURE: 97.6 F | HEART RATE: 62 BPM | RESPIRATION RATE: 16 BRPM | SYSTOLIC BLOOD PRESSURE: 130 MMHG | BODY MASS INDEX: 30.54 KG/M2 | HEIGHT: 67 IN

## 2022-11-22 DIAGNOSIS — K76.0 FATTY LIVER: ICD-10-CM

## 2022-11-22 DIAGNOSIS — J45.909 ASTHMA WITHOUT STATUS ASTHMATICUS WITHOUT COMPLICATION, UNSPECIFIED ASTHMA SEVERITY, UNSPECIFIED WHETHER PERSISTENT: Chronic | ICD-10-CM

## 2022-11-22 DIAGNOSIS — R91.8 MULTIPLE NODULES OF LUNG: ICD-10-CM

## 2022-11-22 DIAGNOSIS — R73.03 PREDIABETES: ICD-10-CM

## 2022-11-22 DIAGNOSIS — Z00.00 WELL ADULT EXAM: ICD-10-CM

## 2022-11-22 DIAGNOSIS — L98.9 SKIN LESION: ICD-10-CM

## 2022-11-22 DIAGNOSIS — C49.A2 GASTROINTESTINAL STROMAL TUMOR OF STOMACH (HCC): ICD-10-CM

## 2022-11-22 PROCEDURE — 99204 OFFICE O/P NEW MOD 45 MIN: CPT | Performed by: INTERNAL MEDICINE

## 2022-11-22 RX ORDER — BENZONATATE 100 MG/1
100 CAPSULE ORAL 3 TIMES DAILY PRN
Qty: 60 CAPSULE | Refills: 0 | Status: SHIPPED
Start: 2022-11-22 | End: 2023-05-15

## 2022-11-22 ASSESSMENT — FIBROSIS 4 INDEX: FIB4 SCORE: 1.16

## 2022-11-22 NOTE — ASSESSMENT & PLAN NOTE
This is a chronic condition.  The patient presently followed by pulmonologist.  The patient currently on Advair 2 puffs twice daily and Spiriva.  He also uses albuterol as needed for rescue treatment.    Patient was seen by pulmonologist recently and was treated for possible pneumonia with Zithromax  Patient reported that the cough has improved but persists.  Patient stated that he has try to contact the pulmonologist but unable to get through.  Patient is requesting a cough medicine until he sees the pulmonologist.  Patient denies fever or chills.

## 2022-11-22 NOTE — PROGRESS NOTES
PRIMARY CARE CLINIC VISIT    Chief complaint:    Establish care  Request cough medication  Medical condition discussion      History of Present Illness     Asthma without status asthmaticus  This is a chronic condition.  The patient presently followed by pulmonologist.  The patient currently on Advair 2 puffs twice daily and Spiriva.  He also uses albuterol as needed for rescue treatment.    Patient was seen by pulmonologist recently and was treated for possible pneumonia with Zithromax  Patient reported that the cough has improved but persists.  Patient stated that he has try to contact the pulmonologist but unable to get through.  Patient is requesting a cough medicine until he sees the pulmonologist.  Patient denies fever or chills.    Fatty liver  This is a chronic condition noted with recent CT scan.  Strongly advised the patient to quit drinking alcohol.  Stressed importance of weight loss.    Gastrointestinal stromal tumor of stomach (HCC)  This is a chronic condition.  The patient status post surgery 2017.  The patient presently followed by oncologist Dr. Pugh  Recent CT scan result discussed with the patient.  Patient also has pending appointment to follow-up with an oncologist    Multiple nodules of lung  This is a chronic condition.  Followed by pulmonologist.  Patient denies fever or chills.  The patient was seen by pulmonologist recently.  Please refer to the pulmonologist note for detail.    Prediabetes  This is a chronic condition noted with recent lab test.  Lab test result discussed with the patient.  Stressed importance of diet and exercise    Current Outpatient Medications on File Prior to Visit   Medication Sig Dispense Refill    tiotropium (SPIRIVA RESPIMAT) 2.5 mcg/Act Aero Soln Inhale 2 Inhalation every day. 1 Each 11    albuterol (PROVENTIL) 2.5mg/3ml Nebu Soln solution for nebulization Take 3 mL by nebulization every 6 hours as needed for Shortness of Breath. 120 Each 0    albuterol  "(VENTOLIN HFA) 108 (90 Base) MCG/ACT Aero Soln inhalation aerosol Inhale 1 Puff every 6 hours as needed for Shortness of Breath. 2 Each 3    fluticasone-salmeterol (ADVAIR HFA) 230-21 MCG/ACT inhaler Inhale 2 Puffs 2 times a day. Inhalation with spacer. Rinse mouth after each use. 1 Each 11    azithromycin (ZITHROMAX) 250 MG Tab Take 2 tablets on day 1, then take 1 tablet a day for 4 days. 6 Tablet 0    sildenafil citrate (VIAGRA) 100 MG tablet Take 0.5 Tabs by mouth 1 time daily as needed for Erectile Dysfunction. 30 Tab 2     No current facility-administered medications on file prior to visit.        Allergies: Penicillins, Succinylcholine, and Epinephrine    ROS  As per HPI above. All other systems reviewed and negative.      Past Medical, Social, and Family history reviewed and updated in EPIC     Objective     /78 (BP Location: Left arm, Patient Position: Sitting, BP Cuff Size: Adult)   Pulse 62   Temp 36.4 °C (97.6 °F) (Temporal)   Resp 16   Ht 1.702 m (5' 7\")   Wt 88.3 kg (194 lb 9.6 oz)   SpO2 97%    Body mass index is 30.48 kg/m².    General: alert in no apparent distress.  Cardiovascular: regular rate and rhythm  Pulmonary: lungs : no wheezing   Gastrointestinal: BS present. No obvious mass noted  Scalp show several raised hyperpigmented maculopapular lesions        Assessment and Plan     1. Gastrointestinal stromal tumor of stomach (HCC)  Chronic condition therapy with surgery.  Advised the patient to continue follow-up with oncology service.  Recent CT scan result discussed with the patient.    2. Multiple nodules of lung  3. Asthma without status asthmaticus without complication, unspecified asthma severity, unspecified whether persistent  Chronic conditions.  Advised the patient to follow-up with pulmonologist.  Patient was treated for pneumonia with azithromycin recently.  The cough has improved but persist.  Today I prescribed Tessalon.  Advised the patient to be sure to follow-up with " pulmonologist.  The patient voiced understanding    4. Prediabetes  Chronic condition.  Advised the patient regarding diet and exercise.  The patient will try to lose some weight  5. Fatty liver  This is a chronic condition.  Strongly advised the patient to quit drinking alcohol.  Stressed importance of weight loss.  6. Skin lesions noted on scalp.  This is a new condition.  - Referral to Dermatology  Sun protection advised      Follow-up 6 months                  Please note that this dictation was created using voice recognition software. I have made every reasonable attempt to correct obvious errors, but I expect that there are errors of grammar and possibly content that I did not discover before finalizing the note.    Baron Momin MD  Internal Medicine  [facility]

## 2022-11-22 NOTE — ASSESSMENT & PLAN NOTE
This is a chronic condition noted with recent CT scan.  Strongly advised the patient to quit drinking alcohol.  Stressed importance of weight loss.

## 2022-11-22 NOTE — ASSESSMENT & PLAN NOTE
This is a chronic condition.  Followed by pulmonologist.  Patient denies fever or chills.  The patient was seen by pulmonologist recently.  Please refer to the pulmonologist note for detail.

## 2022-11-22 NOTE — ASSESSMENT & PLAN NOTE
This is a chronic condition noted with recent lab test.  Lab test result discussed with the patient.  Stressed importance of diet and exercise

## 2022-11-22 NOTE — ASSESSMENT & PLAN NOTE
This is a chronic condition.  The patient status post surgery 2017.  The patient presently followed by oncologist Dr. Pugh  Recent CT scan result discussed with the patient.  Patient also has pending appointment to follow-up with an oncologist

## 2022-11-30 ENCOUNTER — PATIENT MESSAGE (OUTPATIENT)
Dept: SLEEP MEDICINE | Facility: MEDICAL CENTER | Age: 62
End: 2022-11-30
Payer: COMMERCIAL

## 2022-12-02 NOTE — ANESTHESIA PREPROCEDURE EVALUATION
Relevant Problems   (+) Asthma without status asthmaticus       Physical Exam    Airway   Mallampati: II  TM distance: >3 FB  Neck ROM: full       Cardiovascular - normal exam  Rhythm: regular  Rate: normal  (-) murmur     Dental - normal exam         Pulmonary - normal exam  Breath sounds clear to auscultation     Abdominal    Neurological - normal exam                 Anesthesia Plan    ASA 2       Plan - MAC             Induction: intravenous    Postoperative Plan: Postoperative administration of opioids is intended.    Pertinent diagnostic labs and testing reviewed    Informed Consent:    Anesthetic plan and risks discussed with patient.    Use of blood products discussed with: patient whom consented to blood products.          Yes

## 2022-12-16 ENCOUNTER — PATIENT MESSAGE (OUTPATIENT)
Dept: SLEEP MEDICINE | Facility: MEDICAL CENTER | Age: 62
End: 2022-12-16
Payer: COMMERCIAL

## 2022-12-19 ENCOUNTER — HOSPITAL ENCOUNTER (OUTPATIENT)
Dept: LAB | Facility: MEDICAL CENTER | Age: 62
End: 2022-12-19
Attending: INTERNAL MEDICINE
Payer: COMMERCIAL

## 2022-12-19 DIAGNOSIS — Z00.00 WELL ADULT EXAM: ICD-10-CM

## 2022-12-19 LAB
ALBUMIN SERPL BCP-MCNC: 4.6 G/DL (ref 3.2–4.9)
ALBUMIN/GLOB SERPL: 1.7 G/DL
ALP SERPL-CCNC: 88 U/L (ref 30–99)
ALT SERPL-CCNC: 35 U/L (ref 2–50)
ANION GAP SERPL CALC-SCNC: 9 MMOL/L (ref 7–16)
AST SERPL-CCNC: 19 U/L (ref 12–45)
BASOPHILS # BLD AUTO: 0.3 % (ref 0–1.8)
BASOPHILS # BLD: 0.02 K/UL (ref 0–0.12)
BILIRUB SERPL-MCNC: 0.5 MG/DL (ref 0.1–1.5)
BUN SERPL-MCNC: 12 MG/DL (ref 8–22)
CALCIUM ALBUM COR SERPL-MCNC: 9.2 MG/DL (ref 8.5–10.5)
CALCIUM SERPL-MCNC: 9.7 MG/DL (ref 8.5–10.5)
CHLORIDE SERPL-SCNC: 102 MMOL/L (ref 96–112)
CO2 SERPL-SCNC: 26 MMOL/L (ref 20–33)
CREAT SERPL-MCNC: 0.89 MG/DL (ref 0.5–1.4)
EOSINOPHIL # BLD AUTO: 0.38 K/UL (ref 0–0.51)
EOSINOPHIL NFR BLD: 6.4 % (ref 0–6.9)
ERYTHROCYTE [DISTWIDTH] IN BLOOD BY AUTOMATED COUNT: 40.8 FL (ref 35.9–50)
GFR SERPLBLD CREATININE-BSD FMLA CKD-EPI: 97 ML/MIN/1.73 M 2
GLOBULIN SER CALC-MCNC: 2.7 G/DL (ref 1.9–3.5)
GLUCOSE SERPL-MCNC: 108 MG/DL (ref 65–99)
HCT VFR BLD AUTO: 48.5 % (ref 42–52)
HCV AB SER QL: NORMAL
HGB BLD-MCNC: 16.3 G/DL (ref 14–18)
IMM GRANULOCYTES # BLD AUTO: 0.02 K/UL (ref 0–0.11)
IMM GRANULOCYTES NFR BLD AUTO: 0.3 % (ref 0–0.9)
LYMPHOCYTES # BLD AUTO: 1.26 K/UL (ref 1–4.8)
LYMPHOCYTES NFR BLD: 21.2 % (ref 22–41)
MCH RBC QN AUTO: 29.3 PG (ref 27–33)
MCHC RBC AUTO-ENTMCNC: 33.6 G/DL (ref 33.7–35.3)
MCV RBC AUTO: 87.1 FL (ref 81.4–97.8)
MONOCYTES # BLD AUTO: 0.48 K/UL (ref 0–0.85)
MONOCYTES NFR BLD AUTO: 8.1 % (ref 0–13.4)
NEUTROPHILS # BLD AUTO: 3.78 K/UL (ref 1.82–7.42)
NEUTROPHILS NFR BLD: 63.7 % (ref 44–72)
NRBC # BLD AUTO: 0 K/UL
NRBC BLD-RTO: 0 /100 WBC
PLATELET # BLD AUTO: 220 K/UL (ref 164–446)
PMV BLD AUTO: 10.5 FL (ref 9–12.9)
POTASSIUM SERPL-SCNC: 4 MMOL/L (ref 3.6–5.5)
PROT SERPL-MCNC: 7.3 G/DL (ref 6–8.2)
RBC # BLD AUTO: 5.57 M/UL (ref 4.7–6.1)
SODIUM SERPL-SCNC: 137 MMOL/L (ref 135–145)
WBC # BLD AUTO: 5.9 K/UL (ref 4.8–10.8)

## 2022-12-19 PROCEDURE — 85025 COMPLETE CBC W/AUTO DIFF WBC: CPT

## 2022-12-19 PROCEDURE — 36415 COLL VENOUS BLD VENIPUNCTURE: CPT

## 2022-12-19 PROCEDURE — 80053 COMPREHEN METABOLIC PANEL: CPT

## 2022-12-19 PROCEDURE — 86803 HEPATITIS C AB TEST: CPT

## 2023-02-09 ENCOUNTER — OFFICE VISIT (OUTPATIENT)
Dept: DERMATOLOGY | Facility: IMAGING CENTER | Age: 63
End: 2023-02-09
Payer: COMMERCIAL

## 2023-02-09 DIAGNOSIS — L57.0 ACTINIC KERATOSIS: ICD-10-CM

## 2023-02-09 PROCEDURE — 17003 DESTRUCT PREMALG LES 2-14: CPT | Performed by: NURSE PRACTITIONER

## 2023-02-09 PROCEDURE — 17000 DESTRUCT PREMALG LESION: CPT | Performed by: NURSE PRACTITIONER

## 2023-02-09 NOTE — PROGRESS NOTES
"DERMATOLOGY NOTE  NEW VISIT       Chief complaint: Establish Care and Skin Lesion   Pt was referred to us for lesions on scalp        History of skin cancer: No  History of precancers/actinic keratoses: No  History of biopsies:Yes, Details: Benign  History of blistering/severe sunburns:Yes, Details: Childhood to adult  no blisters  Family history of skin cancer:Yes, Details: Unsure   Family history of atypical moles:Yes, Details: Sister       Allergies   Allergen Reactions    Penicillins Rash     Rash as [age] year old    Succinylcholine Unspecified     \"weakness and ill\", stopped breathing    Epinephrine Unspecified     Highly sensitive.   7/17/20- pt states he can have epinephrine at reduced dose.        MEDICATIONS:  Medications relevant to specialty reviewed.     REVIEW OF SYSTEMS:   Positive for skin (see HPI)  Negative for fevers and chills       EXAM:  There were no vitals taken for this visit.  Constitutional: Well-developed, well-nourished, and in no distress.     A focused skin exam was performed including the affected areas of the head (including face). Notable findings on exam today listed below and/or in assessment/plan.     Numerous ill-defined erythematous gritty/scaly papules over the scalp-vertex and scalp, forehead and R temple      IMPRESSION / PLAN:    1. Actinic keratosis  - NMSC education/counseling /handouts given on sun protection and skin cancer  CRYOTHERAPY:  Risks (including, but not limited to: skin discoloration, redness, blister, blood blister, recurrence, need for further treatment, infection, scar) and benefits of cryotherapy discussed. Patient verbally agreed to proceed with treatment. 1 cryotherapy freeze thaw cycles of 10 seconds were applied to 12 lesions on areas as noted on exam with cryac. Patient tolerated procedure well. Aftercare instructions given--no specific care needed unless irritated during healing process, can apply Vaseline with small band-aid if needed.  Follow up 6 " weeks, will discuss field tx        Discussed risks, benefits, alternative treatments as well as common side effects associated with prescribed treatment, Patient verbalized understanding and agrees with plan regarding the above            Please note that this dictation was created using voice recognition software. I have made every reasonable attempt to correct obvious errors, but I expect that there are errors of grammar and possibly content that I did not discover before finalizing the note.      Return to clinic in: Return in about 6 weeks (around 3/23/2023) for AK follow up. and as needed for any new or changing skin lesions.

## 2023-04-10 ENCOUNTER — APPOINTMENT (OUTPATIENT)
Dept: DERMATOLOGY | Facility: IMAGING CENTER | Age: 63
End: 2023-04-10
Payer: COMMERCIAL

## 2023-04-20 ENCOUNTER — OFFICE VISIT (OUTPATIENT)
Dept: DERMATOLOGY | Facility: IMAGING CENTER | Age: 63
End: 2023-04-20
Payer: COMMERCIAL

## 2023-04-20 DIAGNOSIS — L57.0 ACTINIC KERATOSIS: ICD-10-CM

## 2023-04-20 PROCEDURE — 17003 DESTRUCT PREMALG LES 2-14: CPT | Performed by: NURSE PRACTITIONER

## 2023-04-20 PROCEDURE — 17000 DESTRUCT PREMALG LESION: CPT | Performed by: NURSE PRACTITIONER

## 2023-04-20 NOTE — PROGRESS NOTES
"DERMATOLOGY NOTE  FOLLOW UP VISIT       Chief complaint: Actinic Keratosis    Pt here for AK Fv, previously txd w/ ln2     History of skin cancer: No  History of precancers/actinic keratoses: No  History of biopsies:Yes, Details: Benign  History of blistering/severe sunburns:Yes, Details: Childhood to adult  no blisters  Family history of skin cancer:Yes, Details: Unsure   Family history of atypical moles:Yes, Details: Sister       Allergies   Allergen Reactions    Penicillins Rash     Rash as [age] year old    Succinylcholine Unspecified     \"weakness and ill\", stopped breathing    Epinephrine Unspecified     Highly sensitive.   7/17/20- pt states he can have epinephrine at reduced dose.        MEDICATIONS:  Medications relevant to specialty reviewed.     REVIEW OF SYSTEMS:   Positive for skin (see HPI)  Negative for fevers and chills       EXAM:  There were no vitals taken for this visit.  Constitutional: Well-developed, well-nourished, and in no distress.     A focused skin exam was performed including the affected areas of the head (including face). Notable findings on exam today listed below and/or in assessment/plan.     3 remaining ill-defined erythematous gritty/scaly papules over the scalp-vertex      IMPRESSION / PLAN:    1. Actinic keratosis  - NMSC education/counseling /handouts given on sun protection and skin cancer  CRYOTHERAPY:  Risks (including, but not limited to: skin discoloration, redness, blister, blood blister, recurrence, need for further treatment, infection, scar) and benefits of cryotherapy discussed. Patient verbally agreed to proceed with treatment. 1 cryotherapy freeze thaw cycles of 10 seconds were applied to 3 lesions on areas as noted on exam with cryac. Patient tolerated procedure well. Aftercare instructions given--no specific care needed unless irritated during healing process, can apply Vaseline with small band-aid if needed.  Follow up as needed     Patient verbalized " understanding and agrees with plan regarding the above      Please note that this dictation was created using voice recognition software. I have made every reasonable attempt to correct obvious errors, but I expect that there are errors of grammar and possibly content that I did not discover before finalizing the note.      Return to clinic in: Return for PRN. and as needed for any new or changing skin lesions.

## 2023-05-15 ENCOUNTER — HOSPITAL ENCOUNTER (OUTPATIENT)
Dept: LAB | Facility: MEDICAL CENTER | Age: 63
End: 2023-05-15
Attending: INTERNAL MEDICINE
Payer: COMMERCIAL

## 2023-05-15 ENCOUNTER — HOSPITAL ENCOUNTER (OUTPATIENT)
Dept: RADIOLOGY | Facility: MEDICAL CENTER | Age: 63
End: 2023-05-15
Attending: INTERNAL MEDICINE
Payer: COMMERCIAL

## 2023-05-15 ENCOUNTER — OFFICE VISIT (OUTPATIENT)
Dept: MEDICAL GROUP | Facility: PHYSICIAN GROUP | Age: 63
End: 2023-05-15
Payer: COMMERCIAL

## 2023-05-15 VITALS
HEART RATE: 72 BPM | SYSTOLIC BLOOD PRESSURE: 134 MMHG | HEIGHT: 67 IN | TEMPERATURE: 98.3 F | WEIGHT: 194.38 LBS | RESPIRATION RATE: 18 BRPM | BODY MASS INDEX: 30.51 KG/M2 | OXYGEN SATURATION: 96 % | DIASTOLIC BLOOD PRESSURE: 80 MMHG

## 2023-05-15 DIAGNOSIS — S20.361D TICK BITE OF RIGHT FRONT WALL OF THORAX, SUBSEQUENT ENCOUNTER: ICD-10-CM

## 2023-05-15 DIAGNOSIS — J45.909 ASTHMA WITHOUT STATUS ASTHMATICUS WITHOUT COMPLICATION, UNSPECIFIED ASTHMA SEVERITY, UNSPECIFIED WHETHER PERSISTENT: Chronic | ICD-10-CM

## 2023-05-15 DIAGNOSIS — M25.511 CHRONIC RIGHT SHOULDER PAIN: ICD-10-CM

## 2023-05-15 DIAGNOSIS — G89.29 CHRONIC RIGHT SHOULDER PAIN: ICD-10-CM
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
## 2023-05-15 DIAGNOSIS — M79.641 RIGHT HAND PAIN: ICD-10-CM

## 2023-05-15 DIAGNOSIS — S20.369D: ICD-10-CM
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
## 2023-05-15 PROCEDURE — 73030 X-RAY EXAM OF SHOULDER: CPT | Mod: RT

## 2023-05-15 PROCEDURE — 3079F DIAST BP 80-89 MM HG: CPT | Performed by: INTERNAL MEDICINE

## 2023-05-15 PROCEDURE — 73130 X-RAY EXAM OF HAND: CPT | Mod: RT

## 2023-05-15 PROCEDURE — 36415 COLL VENOUS BLD VENIPUNCTURE: CPT

## 2023-05-15 PROCEDURE — 3075F SYST BP GE 130 - 139MM HG: CPT | Performed by: INTERNAL MEDICINE

## 2023-05-15 PROCEDURE — 99214 OFFICE O/P EST MOD 30 MIN: CPT | Performed by: INTERNAL MEDICINE

## 2023-05-15 PROCEDURE — 87476 LYME DIS DNA AMP PROBE: CPT

## 2023-05-15 ASSESSMENT — FIBROSIS 4 INDEX: FIB4 SCORE: 0.91

## 2023-05-15 ASSESSMENT — PATIENT HEALTH QUESTIONNAIRE - PHQ9: CLINICAL INTERPRETATION OF PHQ2 SCORE: 0

## 2023-05-15 NOTE — ASSESSMENT & PLAN NOTE
Patient reported he grew up in [place] for many years  Patient reported tick bites when he was visiting [place] in November 2022.  The patient showed me pictures.  Patient stated that he was seen by medical provider and was treated with doxycycline.  Patient is here today requesting testing for Lyme's disease.

## 2023-05-15 NOTE — PROGRESS NOTES
PRIMARY CARE CLINIC VISIT    Chief complaint:    Right shoulder and right hand pain  Testing for Lyme's disease      History of Present Illness     Right shoulder pain  Chronic condition for the patient reported right shoulder pain noted since the last 6 months.   Patient denies history of trauma or injury.  He denies chest pain shortness of breath.  He also denies neck pain.  Pain is worse with shoulder movements.    Right hand pain  Also a chronic condition.  Patient denies recent trauma or injury.  He has been taking over-the-counter Tylenol and Advil with very minimal improvement.    Tick bites  Patient reported he grew up in [place] for many years  Patient reported tick bites when he was visiting [place] in November 2022.  The patient showed me pictures.  Patient stated that he was seen by medical provider and was treated with doxycycline.  Patient is here today requesting testing for Lyme's disease.    Asthma  Chronic ongoing condition for the patient followed by pulmonology service.  The patient currently using Advair 2 puff twice daily.  He uses albuterol as needed.  Currently the patient denies shortness of breath or wheezing.    Current Outpatient Medications on File Prior to Visit   Medication Sig Dispense Refill    tiotropium (SPIRIVA RESPIMAT) 2.5 mcg/Act Aero Soln Inhale 2 Inhalation every day. 1 Each 11    albuterol (PROVENTIL) 2.5mg/3ml Nebu Soln solution for nebulization Take 3 mL by nebulization every 6 hours as needed for Shortness of Breath. 120 Each 0    albuterol (VENTOLIN HFA) 108 (90 Base) MCG/ACT Aero Soln inhalation aerosol Inhale 1 Puff every 6 hours as needed for Shortness of Breath. 2 Each 3    fluticasone-salmeterol (ADVAIR HFA) 230-21 MCG/ACT inhaler Inhale 2 Puffs 2 times a day. Inhalation with spacer. Rinse mouth after each use. 1 Each 11    sildenafil citrate (VIAGRA) 100 MG tablet Take 0.5 Tabs by mouth 1 time daily as needed for Erectile Dysfunction. 30 Tab 2     No current  facility-administered medications on file prior to visit.        Allergies: Penicillins, Succinylcholine, and Epinephrine    Current Outpatient Medications Ordered in Epic   Medication Sig Dispense Refill    tiotropium (SPIRIVA RESPIMAT) 2.5 mcg/Act Aero Soln Inhale 2 Inhalation every day. 1 Each 11    albuterol (PROVENTIL) 2.5mg/3ml Nebu Soln solution for nebulization Take 3 mL by nebulization every 6 hours as needed for Shortness of Breath. 120 Each 0    albuterol (VENTOLIN HFA) 108 (90 Base) MCG/ACT Aero Soln inhalation aerosol Inhale 1 Puff every 6 hours as needed for Shortness of Breath. 2 Each 3    fluticasone-salmeterol (ADVAIR HFA) 230-21 MCG/ACT inhaler Inhale 2 Puffs 2 times a day. Inhalation with spacer. Rinse mouth after each use. 1 Each 11    sildenafil citrate (VIAGRA) 100 MG tablet Take 0.5 Tabs by mouth 1 time daily as needed for Erectile Dysfunction. 30 Tab 2     No current Epic-ordered facility-administered medications on file.       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 Jin, Dr. Isabel (surgeon)     GERD (gastroesophageal reflux disease)     Mumps     Pneumonia     2017    Snoring        Past Surgical History:   Procedure Laterality Date    OR UPPER GI ENDOSCOPY,BIOPSY  7/16/2019    Procedure: GASTROSCOPY, WITH BIOPSY;  Surgeon: Zhou Miller M.D.;  Location: [facility];  Service: EUS    GASTROSCOPY  7/16/2019    Procedure: GASTROSCOPY;  Surgeon: Zhou Miller M.D.;  Location: [facility];  Service: EUS    EGD W/ENDOSCOPIC ULTRASOUND  7/16/2019    Procedure: EGD, WITH ENDOSCOPIC US - UPPER CURVILINEAR;  Surgeon: Zhou Miller M.D.;  Location: [facility];  Service: EUS    EGD WITH ASP/BX  7/16/2019    Procedure: EGD, WITH ASPIRATION BIOPSY;  Surgeon: Zhou  "XUAN Miller M.D.;  Location: SURGERY [facility];  Service: EUS    CHOLECYSTECTOMY      FINGER AMPUTATION Right     right index accident when 3 yo    LAPAROTOMY      OTHER      GIST removal 3/2017    SEPTOPLASTY      SINUSCOPE         Family History   Problem Relation Age of Onset    Hypertension Mother     Other Mother         glaucoma     Cancer Father         pancreatic     Heart Disease Father     Hypertension Brother        Social History     Tobacco Use   Smoking Status Never    Passive exposure: Yes   Smokeless Tobacco Never   Vaping Use    Vaping Use: Never used       Social History     Substance and Sexual Activity   Alcohol Use Yes    Comment: 1-2 PER WEEK       Review of systems.  As per HPI above. All other systems reviewed and negative.      Past Medical, Social, and Family history reviewed and updated in EPIC     Objective     /80 (BP Location: Left arm, Patient Position: Sitting, BP Cuff Size: Adult)   Pulse 72   Temp 36.8 °C (98.3 °F) (Temporal)   Resp 18   Ht 1.702 m (5' 7\")   Wt 88.2 kg (194 lb 6 oz)   SpO2 96%    Body mass index is 30.44 kg/m².    General: alert in no apparent distress.  Cardiovascular: regular rate and rhythm  Pulmonary: lungs : no wheezing   Gastrointestinal: BS present. No obvious mass noted  Right shoulder  no significant swelling redness or deformity.   ROM limited due to pain violeta w shoulder abduction, flexion and extension  Pos impingement sign. Neg thumb down abduction test     Right hand no office swelling redness or deformity.  Range of motion of the finger joints slightly restricted especially with flexion and extension .  Handgrip difficult to evaluate due to pain        Lab Results   Component Value Date/Time    WBC 5.9 12/19/2022 08:10 AM    HEMOGLOBIN 16.3 12/19/2022 08:10 AM    HEMATOCRIT 48.5 12/19/2022 08:10 AM    MCV 87.1 12/19/2022 08:10 AM    PLATELETCT 220 12/19/2022 08:10 AM         Lab Results   Component Value Date/Time    SODIUM 137 " 12/19/2022 08:10 AM    POTASSIUM 4.0 12/19/2022 08:10 AM    GLUCOSE 108 (H) 12/19/2022 08:10 AM    BUN 12 12/19/2022 08:10 AM    CREATININE 0.89 12/19/2022 08:10 AM       Lab Results   Component Value Date/Time    CHOLSTRLTOT 173 10/03/2018 08:34 AM    TRIGLYCERIDE 118 10/03/2018 08:34 AM    HDL 37 (A) 10/03/2018 08:34 AM     (H) 10/03/2018 08:34 AM       Lab Results   Component Value Date/Time    ALTSGPT 35 12/19/2022 08:10 AM             Assessment and Plan     1. Chronic right shoulder pain  2. Right hand pain  Chronic condition noted since the last 6 months  Exam today is suggestive of rotator cuff tendinitis/bursitis.  Rule out other causes.  Also rule out possible degenerative joint/osteoarthritis of the right hand versus others  - DX-SHOULDER 2+ RIGHT; Future  - DX-HAND 3+ RIGHT; Future  - Referral to Orthopedics  Patient may continue to take Tylenol 500 mg every 6 hours PN for pain in the interim.      3. Tick bite of front wall of thorax, unspecified location, subsequent encounter  - LYME PCR,SERUM/PLASMA; Future  Advised the patient to follow-up after lab test done.    4. Asthma     Stable condition to continue with Advair 2 puff twice daily and Spiriva daily.  Continue follow-up with pulmonology service as directed.      reCommend patient follow-up after lab test done.                Please note that this dictation was created using voice recognition software. I have made every reasonable attempt to correct obvious errors, but I expect that there are errors of grammar and possibly content that I did not discover before finalizing the note.    Baron Momin MD  Internal Medicine  [facility]

## 2023-05-15 NOTE — ASSESSMENT & PLAN NOTE
Also a chronic condition.  Patient denies recent trauma or injury.  He has been taking over-the-counter Tylenol and Advil with very minimal improvement.

## 2023-05-15 NOTE — ASSESSMENT & PLAN NOTE
Chronic condition for the patient reported right shoulder pain noted since the last 6 months.   Patient denies history of trauma or injury.  He denies chest pain shortness of breath.  He also denies neck pain.  Pain is worse with shoulder movements.

## 2023-05-15 NOTE — ASSESSMENT & PLAN NOTE
Chronic ongoing condition for the patient followed by pulmonology service.  The patient currently using Advair 2 puff twice daily.  He uses albuterol as needed.  Currently the patient denies shortness of breath or wheezing.

## 2023-05-17 ENCOUNTER — HOSPITAL ENCOUNTER (OUTPATIENT)
Dept: LAB | Facility: MEDICAL CENTER | Age: 63
End: 2023-05-17
Attending: INTERNAL MEDICINE
Payer: COMMERCIAL

## 2023-05-17 LAB
BASOPHILS # BLD AUTO: 0.4 % (ref 0–1.8)
BASOPHILS # BLD: 0.02 K/UL (ref 0–0.12)
EOSINOPHIL # BLD AUTO: 0.27 K/UL (ref 0–0.51)
EOSINOPHIL NFR BLD: 4.9 % (ref 0–6.9)
ERYTHROCYTE [DISTWIDTH] IN BLOOD BY AUTOMATED COUNT: 39.9 FL (ref 35.9–50)
HCT VFR BLD AUTO: 47.3 % (ref 42–52)
HGB BLD-MCNC: 15.9 G/DL (ref 14–18)
IMM GRANULOCYTES # BLD AUTO: 0.02 K/UL (ref 0–0.11)
IMM GRANULOCYTES NFR BLD AUTO: 0.4 % (ref 0–0.9)
LYMPHOCYTES # BLD AUTO: 1.45 K/UL (ref 1–4.8)
LYMPHOCYTES NFR BLD: 26.2 % (ref 22–41)
MCH RBC QN AUTO: 28.8 PG (ref 27–33)
MCHC RBC AUTO-ENTMCNC: 33.6 G/DL (ref 33.7–35.3)
MCV RBC AUTO: 85.5 FL (ref 81.4–97.8)
MONOCYTES # BLD AUTO: 0.5 K/UL (ref 0–0.85)
MONOCYTES NFR BLD AUTO: 9 % (ref 0–13.4)
NEUTROPHILS # BLD AUTO: 3.27 K/UL (ref 1.82–7.42)
NEUTROPHILS NFR BLD: 59.1 % (ref 44–72)
NRBC # BLD AUTO: 0 K/UL
NRBC BLD-RTO: 0 /100 WBC
PLATELET # BLD AUTO: 217 K/UL (ref 164–446)
PMV BLD AUTO: 10.5 FL (ref 9–12.9)
RBC # BLD AUTO: 5.53 M/UL (ref 4.7–6.1)
WBC # BLD AUTO: 5.5 K/UL (ref 4.8–10.8)

## 2023-05-17 PROCEDURE — 36415 COLL VENOUS BLD VENIPUNCTURE: CPT

## 2023-05-17 PROCEDURE — 80053 COMPREHEN METABOLIC PANEL: CPT

## 2023-05-17 PROCEDURE — 85025 COMPLETE CBC W/AUTO DIFF WBC: CPT

## 2023-05-18 ENCOUNTER — HOSPITAL ENCOUNTER (OUTPATIENT)
Dept: RADIOLOGY | Facility: MEDICAL CENTER | Age: 63
End: 2023-05-18
Attending: INTERNAL MEDICINE
Payer: COMMERCIAL

## 2023-05-18 DIAGNOSIS — R63.4 ABNORMAL WEIGHT LOSS: ICD-10-CM

## 2023-05-18 DIAGNOSIS — C49.A9 GASTROINTESTINAL STROMAL TUMOR OF OTHER SITES (HCC): ICD-10-CM

## 2023-05-18 DIAGNOSIS — D64.9 ANEMIA, UNSPECIFIED TYPE: ICD-10-CM

## 2023-05-18 DIAGNOSIS — C49.A2: ICD-10-CM

## 2023-05-18 LAB
ALBUMIN SERPL BCP-MCNC: 4.3 G/DL (ref 3.2–4.9)
ALBUMIN/GLOB SERPL: 1.6 G/DL
ALP SERPL-CCNC: 85 U/L (ref 30–99)
ALT SERPL-CCNC: 29 U/L (ref 2–50)
ANION GAP SERPL CALC-SCNC: 11 MMOL/L (ref 7–16)
AST SERPL-CCNC: 23 U/L (ref 12–45)
B BURGDOR DNA SPEC QL NAA+PROBE: NOT DETECTED
BILIRUB SERPL-MCNC: 0.6 MG/DL (ref 0.1–1.5)
BUN SERPL-MCNC: 14 MG/DL (ref 8–22)
CALCIUM ALBUM COR SERPL-MCNC: 9.1 MG/DL (ref 8.5–10.5)
CALCIUM SERPL-MCNC: 9.3 MG/DL (ref 8.5–10.5)
CHLORIDE SERPL-SCNC: 104 MMOL/L (ref 96–112)
CO2 SERPL-SCNC: 22 MMOL/L (ref 20–33)
CREAT SERPL-MCNC: 0.78 MG/DL (ref 0.5–1.4)
FASTING STATUS PATIENT QL REPORTED: NORMAL
GFR SERPLBLD CREATININE-BSD FMLA CKD-EPI: 101 ML/MIN/1.73 M 2
GLOBULIN SER CALC-MCNC: 2.7 G/DL (ref 1.9–3.5)
GLUCOSE SERPL-MCNC: 100 MG/DL (ref 65–99)
LYME SOURCE Q4169: NORMAL
POTASSIUM SERPL-SCNC: 4.4 MMOL/L (ref 3.6–5.5)
PROT SERPL-MCNC: 7 G/DL (ref 6–8.2)
SODIUM SERPL-SCNC: 137 MMOL/L (ref 135–145)

## 2023-05-18 PROCEDURE — 71260 CT THORAX DX C+: CPT

## 2023-05-18 PROCEDURE — 700117 HCHG RX CONTRAST REV CODE 255: Performed by: INTERNAL MEDICINE

## 2023-05-18 RX ADMIN — IOHEXOL 25 ML: 240 INJECTION, SOLUTION INTRATHECAL; INTRAVASCULAR; INTRAVENOUS; ORAL at 11:19

## 2023-05-18 RX ADMIN — IOHEXOL 100 ML: 350 INJECTION, SOLUTION INTRAVENOUS at 11:28

## 2023-06-19 PROBLEM — M65.351 TRIGGER FINGER, RIGHT LITTLE FINGER: Status: ACTIVE | Noted: 2023-06-19

## 2023-06-19 PROBLEM — M65.341 TRIGGER FINGER, RIGHT RING FINGER: Status: ACTIVE | Noted: 2023-06-19

## 2023-06-19 PROBLEM — M65.331 TRIGGER FINGER, RIGHT MIDDLE FINGER: Status: ACTIVE | Noted: 2023-06-19

## 2023-06-20 ENCOUNTER — HOSPITAL ENCOUNTER (OUTPATIENT)
Dept: RADIOLOGY | Facility: MEDICAL CENTER | Age: 63
End: 2023-06-20
Attending: ORTHOPAEDIC SURGERY
Payer: COMMERCIAL

## 2023-06-20 DIAGNOSIS — M75.101 ROTATOR CUFF SYNDROME OF RIGHT SHOULDER: ICD-10-CM

## 2023-06-20 PROCEDURE — 73221 MRI JOINT UPR EXTREM W/O DYE: CPT

## 2024-01-15 DIAGNOSIS — J45.40 MODERATE PERSISTENT ASTHMA WITHOUT COMPLICATION: ICD-10-CM

## 2024-01-15 NOTE — TELEPHONE ENCOUNTER
Pt called and lm requesting refills on his medications  Called and spoke with pt. Notified pt I received his message and will forward this to a provider.     Rx's Pended    Have we ever prescribed this med? Yes.  If yes, what date? 11/15/22    Last OV: 11/15/22 with Dr Das    Next OV: 03/07/24 with Dr Das     DX:     Medications:   Requested Prescriptions     Pending Prescriptions Disp Refills    albuterol (PROVENTIL) 2.5mg/3ml Nebu Soln solution for nebulization 120 Each 0     Sig: Take 3 mL by nebulization every 6 hours as needed for Shortness of Breath.    fluticasone-salmeterol (ADVAIR HFA) 230-21 MCG/ACT inhaler 1 Each 11     Sig: Inhale 2 Puffs 2 times a day. Inhalation with spacer. Rinse mouth after each use.    tiotropium (SPIRIVA RESPIMAT) 2.5 mcg/Act Aero Soln 1 Each 11     Sig: Inhale 2 Inhalations every day.    albuterol (VENTOLIN HFA) 108 (90 Base) MCG/ACT Aero Soln inhalation aerosol 2 Each 3     Sig: Inhale 1 Puff every 6 hours as needed for Shortness of Breath.

## 2024-01-17 RX ORDER — FLUTICASONE PROPIONATE AND SALMETEROL XINAFOATE 230; 21 UG/1; UG/1
2 AEROSOL, METERED RESPIRATORY (INHALATION) 2 TIMES DAILY
Qty: 1 EACH | Refills: 11 | Status: SHIPPED | OUTPATIENT
Start: 2024-01-17 | End: 2024-03-07 | Stop reason: SDUPTHER

## 2024-01-17 RX ORDER — TIOTROPIUM BROMIDE INHALATION SPRAY 3.12 UG/1
5 SPRAY, METERED RESPIRATORY (INHALATION) DAILY
Qty: 1 EACH | Refills: 11 | Status: SHIPPED | OUTPATIENT
Start: 2024-01-17 | End: 2024-03-07 | Stop reason: SDUPTHER

## 2024-01-17 RX ORDER — ALBUTEROL SULFATE 2.5 MG/3ML
2.5 SOLUTION RESPIRATORY (INHALATION) EVERY 6 HOURS PRN
Qty: 120 EACH | Refills: 0 | Status: SHIPPED | OUTPATIENT
Start: 2024-01-17

## 2024-01-17 RX ORDER — ALBUTEROL SULFATE 90 UG/1
1 AEROSOL, METERED RESPIRATORY (INHALATION) EVERY 6 HOURS PRN
Qty: 2 EACH | Refills: 3 | Status: SHIPPED | OUTPATIENT
Start: 2024-01-17

## 2024-03-07 ENCOUNTER — OFFICE VISIT (OUTPATIENT)
Dept: SLEEP MEDICINE | Facility: MEDICAL CENTER | Age: 64
End: 2024-03-07
Attending: INTERNAL MEDICINE
Payer: COMMERCIAL

## 2024-03-07 VITALS
BODY MASS INDEX: 31.08 KG/M2 | HEART RATE: 74 BPM | SYSTOLIC BLOOD PRESSURE: 134 MMHG | HEIGHT: 67 IN | OXYGEN SATURATION: 94 % | DIASTOLIC BLOOD PRESSURE: 86 MMHG | WEIGHT: 198 LBS

## 2024-03-07 DIAGNOSIS — J45.909 ASTHMA WITHOUT STATUS ASTHMATICUS WITHOUT COMPLICATION, UNSPECIFIED ASTHMA SEVERITY, UNSPECIFIED WHETHER PERSISTENT: Chronic | ICD-10-CM

## 2024-03-07 DIAGNOSIS — J45.40 MODERATE PERSISTENT ASTHMA WITHOUT COMPLICATION: ICD-10-CM

## 2024-03-07 PROCEDURE — 99212 OFFICE O/P EST SF 10 MIN: CPT | Performed by: INTERNAL MEDICINE

## 2024-03-07 PROCEDURE — 3075F SYST BP GE 130 - 139MM HG: CPT | Performed by: INTERNAL MEDICINE

## 2024-03-07 PROCEDURE — 3079F DIAST BP 80-89 MM HG: CPT | Performed by: INTERNAL MEDICINE

## 2024-03-07 RX ORDER — TIOTROPIUM BROMIDE INHALATION SPRAY 3.12 UG/1
5 SPRAY, METERED RESPIRATORY (INHALATION) DAILY
Qty: 1 EACH | Refills: 11 | Status: SHIPPED | OUTPATIENT
Start: 2024-03-07

## 2024-03-07 RX ORDER — FLUTICASONE PROPIONATE AND SALMETEROL XINAFOATE 230; 21 UG/1; UG/1
2 AEROSOL, METERED RESPIRATORY (INHALATION) 2 TIMES DAILY
Qty: 1 EACH | Refills: 11 | Status: SHIPPED | OUTPATIENT
Start: 2024-03-07

## 2024-03-07 ASSESSMENT — ENCOUNTER SYMPTOMS
PSYCHIATRIC NEGATIVE: 1
MUSCULOSKELETAL NEGATIVE: 1
EYES NEGATIVE: 1
CONSTITUTIONAL NEGATIVE: 1
GASTROINTESTINAL NEGATIVE: 1
RESPIRATORY NEGATIVE: 1
NEUROLOGICAL NEGATIVE: 1
CARDIOVASCULAR NEGATIVE: 1

## 2024-03-07 ASSESSMENT — PATIENT HEALTH QUESTIONNAIRE - PHQ9: CLINICAL INTERPRETATION OF PHQ2 SCORE: 0

## 2024-03-07 ASSESSMENT — FIBROSIS 4 INDEX: FIB4 SCORE: 1.24

## 2024-03-07 NOTE — PROGRESS NOTES
Pulmonary Clinic follow up    Date of Service: 3/7/2024    Reason for follow up:  Asthma (Last seen 11/15/22) and Results (Sputum 11/16/22, CT-TAP 05/18/23/)      Problem List Items Addressed This Visit       Asthma (Chronic)     Moderate persistent on Advair and Spiriva  Optimized  No exacerbations  FEV1 52% with FEV1 over FVC ratio 52%  On Advair and Spiriva.  Reviewed inhalers and how they work and inhaler teaching.  Patient is frustrated with the sputum production and reviewed asthma pathophysiology and why sputum production is prevalent in asthma.  Reviewed use of a flutter valve 3 times a week.  This will help decrease the amount of phlegm.  Medications refilled           Relevant Medications    tiotropium (SPIRIVA RESPIMAT) 2.5 mcg/Act Aero Soln    fluticasone-salmeterol (ADVAIR HFA) 230-21 MCG/ACT inhaler    Other Relevant Orders    PULMONARY FUNCTION TESTS -Test requested: Complete Pulmonary Function Test     Other Visit Diagnoses       Moderate persistent asthma without complication        Relevant Medications    tiotropium (SPIRIVA RESPIMAT) 2.5 mcg/Act Aero Soln    fluticasone-salmeterol (ADVAIR HFA) 230-21 MCG/ACT inhaler          Acapella     History of Present Illness: Prashant Hewitt is a 63 y.o. male with a past medical history of  moderate persistent asthma on Advair and spiriva  Never smoker.  History of gastrointestinal stromal tumor of stomach 2019   I last saw patient on November 2022.  In 2019 his FEV1 was 1.71 L [52%] with a ratio 52% no bronchodilator response.  He had been optimized from the last saw him and he was taking Advair and Tudorza.  Patient's insurance to change needed and HFA so changed to Advair HFA and then Spiriva for insurance coverage as well.  Patient [place] a lot [place] time in [place]    His last pan CT was 5/18/2023 and no reoccurrence has been noted on CT  No Er visits  Sputum is white and thick   Almost never uses albuterol  Uses neb 1-2 times a  week    Review of Systems   Constitutional: Negative.    HENT: Negative.     Eyes: Negative.    Respiratory: Negative.     Cardiovascular: Negative.    Gastrointestinal: Negative.    Genitourinary: Negative.    Musculoskeletal: Negative.    Skin: Negative.    Neurological: Negative.    Endo/Heme/Allergies: Negative.    Psychiatric/Behavioral: Negative.         Current Outpatient Medications on File Prior to Visit   Medication Sig Dispense Refill    albuterol (PROVENTIL) 2.5mg/3ml Nebu Soln solution for nebulization Take 3 mL by nebulization every 6 hours as needed for Shortness of Breath. 120 Each 0    albuterol (VENTOLIN HFA) 108 (90 Base) MCG/ACT Aero Soln inhalation aerosol Inhale 1 Puff every 6 hours as needed for Shortness of Breath. 2 Each 3    ibuprofen (MOTRIN) 800 MG Tab       ibuprofen (MOTRIN) 800 MG Tab Take 800 mg by mouth.      sildenafil citrate (VIAGRA) 100 MG tablet Take 0.5 Tabs by mouth 1 time daily as needed for Erectile Dysfunction. 30 Tab 2     No current facility-administered medications on file prior to visit.       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 Jin, Dr. Isabel (surgeon)     GERD (gastroesophageal reflux disease)     Mumps     Pneumonia     2017    Snoring        Past Surgical History:   Procedure Laterality Date    WA UPPER GI ENDOSCOPY,BIOPSY  7/16/2019    Procedure: GASTROSCOPY, WITH BIOPSY;  Surgeon: Zhou Miller M.D.;  Location: SURGERY [facility];  Service: EUS    GASTROSCOPY  7/16/2019    Procedure: GASTROSCOPY;  Surgeon: Zhou Miller  "M.D.;  Location: [facility];  Service: EUS    EGD W/ENDOSCOPIC ULTRASOUND  7/16/2019    Procedure: EGD, WITH ENDOSCOPIC US - UPPER CURVILINEAR;  Surgeon: Zhou Miller M.D.;  Location: [facility];  Service: EUS    EGD WITH ASP/BX  7/16/2019    Procedure: EGD, WITH ASPIRATION BIOPSY;  Surgeon: Zhou Miller M.D.;  Location: [facility];  Service: EUS    CHOLECYSTECTOMY      FINGER AMPUTATION Right     right index accident when 5 yo    LAPAROTOMY      OTHER      GIST removal 3/2017    SEPTOPLASTY      SINUSCOPE         Allergies: Penicillins and Succinylcholine    Family History   Problem Relation Age of Onset    Hypertension Mother     Other Mother         glaucoma     Cancer Father         pancreatic     Heart Disease Father     Hypertension Brother        Vitals:    03/07/24 1029   Height: 1.702 m (5' 7\")   Weight: 89.8 kg (198 lb)   Weight % change since last entry.: 0 %   BP: 134/86   Pulse: 74   BMI (Calculated): 31.01       Physical Examination  Physical Exam  Constitutional:       Appearance: He is obese.   HENT:      Head: Normocephalic and atraumatic.      Mouth/Throat:      Mouth: Mucous membranes are moist.   Eyes:      Extraocular Movements: Extraocular movements intact.      Pupils: Pupils are equal, round, and reactive to light.   Cardiovascular:      Rate and Rhythm: Normal rate.   Pulmonary:      Effort: Pulmonary effort is normal.      Breath sounds: Normal breath sounds.   Musculoskeletal:         General: Normal range of motion.      Cervical back: Normal range of motion.   Skin:     General: Skin is warm and dry.   Neurological:      General: No focal deficit present.      Mental Status: He is alert and oriented to person, place, and time.   Psychiatric:         Mood and Affect: Mood normal.         Behavior: Behavior normal.         Thought Content: Thought content normal.         Judgment: Judgment normal.              Frances Mckeon M.D., MD " MPH LACY  [facility]

## 2024-03-08 NOTE — ASSESSMENT & PLAN NOTE
Moderate persistent on Advair and Spiriva  Optimized  No exacerbations  FEV1 52% with FEV1 over FVC ratio 52%  On Advair and Spiriva.  Reviewed inhalers and how they work and inhaler teaching.  Patient is frustrated with the sputum production and reviewed asthma pathophysiology and why sputum production is prevalent in asthma.  Reviewed use of a flutter valve 3 times a week.  This will help decrease the amount of phlegm.  Medications refilled

## 2024-03-19 ENCOUNTER — NON-PROVIDER VISIT (OUTPATIENT)
Dept: SLEEP MEDICINE | Facility: MEDICAL CENTER | Age: 64
End: 2024-03-19
Attending: INTERNAL MEDICINE
Payer: COMMERCIAL

## 2024-03-19 VITALS — HEIGHT: 67 IN | BODY MASS INDEX: 31.17 KG/M2 | WEIGHT: 198.6 LBS

## 2024-03-19 DIAGNOSIS — J45.909 ASTHMA WITHOUT STATUS ASTHMATICUS WITHOUT COMPLICATION, UNSPECIFIED ASTHMA SEVERITY, UNSPECIFIED WHETHER PERSISTENT: Chronic | ICD-10-CM

## 2024-03-19 PROCEDURE — 94729 DIFFUSING CAPACITY: CPT | Performed by: INTERNAL MEDICINE

## 2024-03-19 PROCEDURE — 94726 PLETHYSMOGRAPHY LUNG VOLUMES: CPT | Performed by: INTERNAL MEDICINE

## 2024-03-19 PROCEDURE — 94060 EVALUATION OF WHEEZING: CPT | Performed by: INTERNAL MEDICINE

## 2024-03-19 PROCEDURE — 94060 EVALUATION OF WHEEZING: CPT | Mod: 26 | Performed by: INTERNAL MEDICINE

## 2024-03-19 PROCEDURE — 94729 DIFFUSING CAPACITY: CPT | Mod: 26 | Performed by: INTERNAL MEDICINE

## 2024-03-19 PROCEDURE — 94726 PLETHYSMOGRAPHY LUNG VOLUMES: CPT | Mod: 26 | Performed by: INTERNAL MEDICINE

## 2024-03-19 ASSESSMENT — PULMONARY FUNCTION TESTS
FEV1_LLN: 2.62
FVC_PERCENT_PREDICTED: 62
FEV1_PERCENT_CHANGE: -1
FEV1/FVC_PERCENT_PREDICTED: 73
FEV1/FVC_PERCENT_LLN: 65
FEV1_PERCENT_PREDICTED: 45
FVC_PREDICTED: 4.06
FEV1/FVC: 58
FEV1_PREDICTED: 3.14
FEV1/FVC: 58
FVC: 2.54
FEV1/FVC_PERCENT_CHANGE: -1
FVC_PERCENT_PREDICTED: 62
FVC: 2.52
FEV1/FVC: 57.14
FEV1_PERCENT_CHANGE: 0
FVC_LLN: 3.39
FEV1/FVC_PERCENT_PREDICTED: 75
FEV1: 1.48
FEV1_PERCENT_PREDICTED: 46
FEV1: 1.44
FEV1/FVC_PREDICTED: 78
FEV1/FVC_PERCENT_PREDICTED: 77
FEV1/FVC: 57
FEV1/FVC_PERCENT_PREDICTED: 73
FEV1/FVC_PERCENT_PREDICTED: 74

## 2024-03-19 ASSESSMENT — FIBROSIS 4 INDEX: FIB4 SCORE: 1.24

## 2024-03-19 NOTE — PROCEDURES
Tech: Kimberly Santo, RT  Good patient effort & cooperation.  Test was performed on the [employer] Body Plethysmograph- Elite DX system.  The predicted sets used for Spirometry are GLI-2012, for Lung Volumes are ITS, and for DLCO is GLI 2017.  The results of this test meet the ATS standards for acceptability and repeatability.  The DLCO was uncorrected for Hb.  A bronchodilator of Ventolin HFA 2 puffs via spacer was administered.  DLCO was performed during dilation period.    Pulmonary function test  Date of study 3/19/2023  Interpreting physician: Ivonne Portillo  Reason for study: Asthma    Results:  Spirometry:  FVC is 2.54 L which is 62% predicted without a significant change postbronchodilator  FEV1 is 1.48 L which is 46% predicted without a significant change postbronchodilator  FEV1/FVC is 57    Lung volumes:  TLC is 6.17 L which is 96% predicted  RV is 3.12 L which is 145% predicted    Diffusion capacity:  DLCO is 32.63 which is on room 31% predicted  DL/VA is 6.17 which is 159% predicted    Interpretation:  1.  Spirometry is significant for a severe obstruction  2.  There is no significant change postbronchodilator  3.  The flow-volume loop is consistent with a restriction combined with obstruction  4.  Lung volumes demonstrate air trapping with an RV/TLC ratio of 51 without hyperinflation  5.  The diffusion capacity is elevated which can be seen in left right heart strain, hyperinflation, obesity and asthma, correlate clinically  6.  In comparison to the prior spirometry from 12/5/2019 the FEV1 has decreased from 1.71 to 1.48 L.    I, Dr. Ivonne Portillo have interpreted and dictated the results of this study.    Ivonne Portillo MD RD  Pulmonary and Critical Care    Available on [facility]

## 2024-04-07 ENCOUNTER — HOSPITAL ENCOUNTER (OUTPATIENT)
Dept: RADIOLOGY | Facility: MEDICAL CENTER | Age: 64
End: 2024-04-07
Attending: FAMILY MEDICINE
Payer: COMMERCIAL

## 2024-04-07 ENCOUNTER — OFFICE VISIT (OUTPATIENT)
Dept: URGENT CARE | Facility: PHYSICIAN GROUP | Age: 64
End: 2024-04-07
Payer: COMMERCIAL

## 2024-04-07 VITALS
SYSTOLIC BLOOD PRESSURE: 138 MMHG | RESPIRATION RATE: 18 BRPM | HEIGHT: 67 IN | WEIGHT: 197.3 LBS | HEART RATE: 66 BPM | BODY MASS INDEX: 30.97 KG/M2 | TEMPERATURE: 97.6 F | DIASTOLIC BLOOD PRESSURE: 84 MMHG | OXYGEN SATURATION: 96 %

## 2024-04-07 DIAGNOSIS — J45.41 MODERATE PERSISTENT ASTHMA WITH EXACERBATION: ICD-10-CM

## 2024-04-07 DIAGNOSIS — J22 ACUTE RESPIRATORY INFECTION: ICD-10-CM

## 2024-04-07 DIAGNOSIS — J98.01 BRONCHOSPASM: ICD-10-CM

## 2024-04-07 PROCEDURE — 71046 X-RAY EXAM CHEST 2 VIEWS: CPT

## 2024-04-07 PROCEDURE — 3075F SYST BP GE 130 - 139MM HG: CPT | Performed by: FAMILY MEDICINE

## 2024-04-07 PROCEDURE — 3079F DIAST BP 80-89 MM HG: CPT | Performed by: FAMILY MEDICINE

## 2024-04-07 PROCEDURE — 99204 OFFICE O/P NEW MOD 45 MIN: CPT | Performed by: FAMILY MEDICINE

## 2024-04-07 RX ORDER — PREDNISONE 10 MG/1
60 TABLET ORAL DAILY
Qty: 30 TABLET | Refills: 0 | Status: SHIPPED | OUTPATIENT
Start: 2024-04-07 | End: 2024-04-07 | Stop reason: SDUPTHER

## 2024-04-07 RX ORDER — DOXYCYCLINE HYCLATE 100 MG
100 TABLET ORAL 2 TIMES DAILY
Qty: 14 TABLET | Refills: 0 | Status: SHIPPED | OUTPATIENT
Start: 2024-04-07 | End: 2024-04-07 | Stop reason: SDUPTHER

## 2024-04-07 RX ORDER — PREDNISONE 10 MG/1
60 TABLET ORAL DAILY
Qty: 30 TABLET | Refills: 0 | Status: SHIPPED | OUTPATIENT
Start: 2024-04-07 | End: 2024-04-12

## 2024-04-07 RX ORDER — DOXYCYCLINE HYCLATE 100 MG
100 TABLET ORAL 2 TIMES DAILY
Qty: 14 TABLET | Refills: 0 | Status: SHIPPED | OUTPATIENT
Start: 2024-04-07 | End: 2024-04-14

## 2024-04-07 ASSESSMENT — ENCOUNTER SYMPTOMS
VOMITING: 0
SHORTNESS OF BREATH: 0
EYE REDNESS: 0
SORE THROAT: 0
NAUSEA: 0
COUGH: 1
CHILLS: 0
WHEEZING: 1
DIZZINESS: 0
MYALGIAS: 0
FEVER: 1

## 2024-04-07 ASSESSMENT — FIBROSIS 4 INDEX: FIB4 SCORE: 1.24

## 2024-04-07 NOTE — PROGRESS NOTES
Subjective:   Prashant Hewitt is a 63 y.o. male who presents for Cough (Phlegm (yellow, green) X 2-3 weeks. States progressively getting better. Taking breathing treatment, and states right lung feels full mucus.)        Cough  Chronicity: Reports cough over the past 3 weeks, reports symptoms improving over the past week, previous productive cough of brown sputum. Episode onset: 3 weeks. The problem has been waxing and waning. The cough is Productive of sputum. Associated symptoms include a fever (Initial, resolved now) and wheezing. Pertinent negatives include no chest pain, chills, eye redness, myalgias, rash, sore throat or shortness of breath. He has tried rest (Reports compliance with scheduled Advair and Spiriva, using albuterol via hand-held nebulizer twice a day) for the symptoms. The treatment provided mild relief. His past medical history is significant for asthma.     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  MEDS:   Current Outpatient Medications:     predniSONE (DELTASONE) 10 MG Tab, Take 6 Tablets by mouth every day for 5 days., Disp: 30 Tablet, Rfl: 0    doxycycline (VIBRAMYCIN) 100 MG Tab, Take 1 Tablet by mouth 2 times a day for 7 days., Disp: 14 Tablet, Rfl: 0    tiotropium (SPIRIVA RESPIMAT) 2.5 mcg/Act Aero Soln, Inhale 2 Inhalations every day., Disp: 1 Each, Rfl: 11    fluticasone-salmeterol (ADVAIR HFA) 230-21 MCG/ACT inhaler, Inhale 2 Puffs 2 times a day. Inhalation with spacer. Rinse mouth after each use., Disp: 1 Each, Rfl: 11    albuterol (PROVENTIL) 2.5mg/3ml Nebu Soln solution for nebulization, Take 3 mL by nebulization every 6 hours as needed for Shortness of Breath., Disp: 120 Each, Rfl: 0    albuterol (VENTOLIN HFA) 108 (90 Base) MCG/ACT Aero Soln inhalation aerosol, Inhale 1 Puff  "every 6 hours as needed for Shortness of Breath., Disp: 2 Each, Rfl: 3    ibuprofen (MOTRIN) 800 MG Tab, , Disp: , Rfl:     ibuprofen (MOTRIN) 800 MG Tab, Take 800 mg by mouth., Disp: , Rfl:     sildenafil citrate (VIAGRA) 100 MG tablet, Take 0.5 Tabs by mouth 1 time daily as needed for Erectile Dysfunction., Disp: 30 Tab, Rfl: 2  ALLERGIES:   Allergies   Allergen Reactions    Penicillins Rash     Rash as [age] year old    Succinylcholine Unspecified     \"weakness and ill\", stopped breathing     SURGHX:   Past Surgical History:   Procedure Laterality Date    LA UPPER GI ENDOSCOPY,BIOPSY  7/16/2019    Procedure: GASTROSCOPY, WITH BIOPSY;  Surgeon: Zhou Miller M.D.;  Location: [facility];  Service: EUS    GASTROSCOPY  7/16/2019    Procedure: GASTROSCOPY;  Surgeon: Zhou Miller M.D.;  Location: [facility];  Service: EUS    EGD W/ENDOSCOPIC ULTRASOUND  7/16/2019    Procedure: EGD, WITH ENDOSCOPIC US - UPPER CURVILINEAR;  Surgeon: Zhou Miller M.D.;  Location: [facility];  Service: EUS    EGD WITH ASP/BX  7/16/2019    Procedure: EGD, WITH ASPIRATION BIOPSY;  Surgeon: Zhou Miller M.D.;  Location: [facility];  Service: EUS    CHOLECYSTECTOMY      FINGER AMPUTATION Right     right index accident when 3 yo    LAPAROTOMY      OTHER      GIST removal 3/2017    SEPTOPLASTY      SINUSCOPE       SOCHX:  reports that he has never smoked. He has been exposed to tobacco smoke. He has never used smokeless tobacco. He reports current alcohol use. He reports that he does not use drugs.  FH:   Family History   Problem Relation Age of Onset    Hypertension Mother     Other Mother         glaucoma     Cancer Father         pancreatic     Heart Disease Father     Hypertension Brother      Review of Systems   Constitutional:  Positive for fever (Initial, resolved now). Negative for chills.   HENT:  Negative for sore throat.    Eyes:  Negative for redness.   Respiratory:  " "Positive for cough and wheezing. Negative for shortness of breath.    Cardiovascular:  Negative for chest pain.   Gastrointestinal:  Negative for nausea and vomiting.   Musculoskeletal:  Negative for myalgias.   Skin:  Negative for rash.   Neurological:  Negative for dizziness.        Objective:   /84 (BP Location: Left arm, Patient Position: Sitting, BP Cuff Size: Adult)   Pulse 66   Temp 36.4 °C (97.6 °F) (Temporal)   Resp 18   Ht 1.702 m (5' 7\")   Wt 89.5 kg (197 lb 4.8 oz)   SpO2 96%   BMI 30.90 kg/m²   Physical Exam  Vitals and nursing note reviewed.   Constitutional:       General: He is not in acute distress.     Appearance: He is well-developed.   HENT:      Head: Normocephalic and atraumatic.      Right Ear: External ear normal.      Left Ear: External ear normal.      Nose: Nose normal.      Mouth/Throat:      Mouth: Mucous membranes are moist.      Pharynx: No oropharyngeal exudate or posterior oropharyngeal erythema.   Eyes:      Conjunctiva/sclera: Conjunctivae normal.   Cardiovascular:      Rate and Rhythm: Normal rate.   Pulmonary:      Effort: Pulmonary effort is normal. No respiratory distress.      Breath sounds: Wheezing (Expiratory) and rhonchi present.      Comments: Speaking full sentences  Abdominal:      General: There is no distension.   Musculoskeletal:         General: Normal range of motion.   Skin:     General: Skin is warm and dry.   Neurological:      General: No focal deficit present.      Mental Status: He is alert and oriented to person, place, and time. Mental status is at baseline.      Gait: Gait (gait at baseline) normal.   Psychiatric:         Judgment: Judgment normal.       DX-CHEST-2 VIEWS  Order: [id number]   Status: Final result       Visible to patient: Yes (seen)       Next appt: 04/12/2024 at 10:00 AM in Pulmonary and Sleep Medicine (Frances Mckeon M.D.)       Dx: Acute respiratory infection    0 Result Notes  Details    Reading Physician Reading Date " Result Priority   Sonido Tolbert M.D.  [phone] 4/7/2024      Narrative & Impression     4/7/2024 12:01 PM     HISTORY/REASON FOR EXAM:  Cough        TECHNIQUE/EXAM DESCRIPTION AND NUMBER OF VIEWS:  Two views of the chest.     COMPARISON:  7/21/2020.     FINDINGS:     No pulmonary infiltrates or consolidations are noted.  No pleural effusions, no pneumothorax are appreciated.  Normal cardiopericardial silhouette.     IMPRESSION:        1. No active cardiopulmonary abnormalities are identified.           Exam Ended: 04/07/24 12:13 PM Last Resulted: 04/07/24 12:14 PM               Assessment/Plan:   1. Acute respiratory infection  - DX-CHEST-2 VIEWS; Future  - doxycycline (VIBRAMYCIN) 100 MG Tab; Take 1 Tablet by mouth 2 times a day for 7 days.  Dispense: 14 Tablet; Refill: 0    2. Bronchospasm  - predniSONE (DELTASONE) 10 MG Tab; Take 6 Tablets by mouth every day for 5 days.  Dispense: 30 Tablet; Refill: 0    3. Moderate persistent asthma with exacerbation  - predniSONE (DELTASONE) 10 MG Tab; Take 6 Tablets by mouth every day for 5 days.  Dispense: 30 Tablet; Refill: 0    Medical decision-making/course: The patient remained afebrile, hemodynamically and neurologically stable with no evidence of respiratory compromise throughout the urgent care course.  There was no immediate clinical indication for the necessity of emergency department evaluation or inpatient admission and the patient was amendable to a trial of outpatient management.        In the course of preparing for this visit with review of the pertinent past medical history, recent and past clinic visits, current medications, and performing chart, immunization, medical history and medication reconciliation, and in the further course of obtaining the current history pertinent to the clinic visit today, performing an exam and evaluation, ordering and independently evaluating labs, tests including independent interpretation evaluation x-ray imaging, and/or  procedures, prescribing any recommended new medications as noted above, providing any pertinent counseling and education and recommending further coordination of care including recommendations for symptomatic and supportive measures, at least  44 minutes of total time were spent during this encounter.      Discussed close monitoring, return precautions, and supportive measures of maintaining adequate fluid hydration and caloric intake, relative rest and symptom management as needed for pain and/or fever.    Differential diagnosis, natural history, supportive care, and indications for immediate follow-up discussed.     Advised the patient to follow-up with the primary care physician for recheck, reevaluation, and consideration of further management.    Please note that this dictation was created using voice recognition software. I have worked with consultants from the vendor as well as technical experts from [employer] to optimize the interface. I have made every reasonable attempt to correct obvious errors, but I expect that there are errors of grammar and possibly content that I did not discover before finalizing the note.

## 2024-04-07 NOTE — PATIENT INSTRUCTIONS
Bronchitis  Bronchitis is a problem of the air tubes leading to your lungs. This problem makes it hard for air to get in and out of the lungs. You may cough a lot because your air tubes are narrow. Going without care can cause lasting (chronic) bronchitis.  HOME CARE   Drink enough fluids to keep your pee (urine) clear or pale yellow.  Use a cool mist humidifier.  Quit smoking if you smoke. If you keep smoking, the bronchitis might not get better.  Only take medicine as told by your doctor.  GET HELP RIGHT AWAY IF:   Coughing keeps you awake.  You start to wheeze.  You become more sick or weak.  You have a hard time breathing or get short of breath.  You cough up blood.  Coughing lasts more than 2 weeks.  You have a fever.  Your baby is older than 3 months with a rectal temperature of 102° F (38.9° C) or higher.  Your baby is 3 months old or younger with a rectal temperature of 100.4° F (38° C) or higher.  MAKE SURE YOU:  Understand these instructions.  Will watch your condition.  Will get help right away if you are not doing well or get worse.  Document Released: 06/05/2009 Document Revised: 03/11/2013 Document Reviewed: 11/19/2010  [employer]Care® Patient Information ©2014 [employer], [place].

## 2024-04-12 ENCOUNTER — OFFICE VISIT (OUTPATIENT)
Dept: SLEEP MEDICINE | Facility: MEDICAL CENTER | Age: 64
End: 2024-04-12
Attending: INTERNAL MEDICINE
Payer: COMMERCIAL

## 2024-04-12 VITALS
HEART RATE: 79 BPM | SYSTOLIC BLOOD PRESSURE: 120 MMHG | HEIGHT: 67 IN | OXYGEN SATURATION: 97 % | BODY MASS INDEX: 30.76 KG/M2 | DIASTOLIC BLOOD PRESSURE: 78 MMHG | WEIGHT: 196 LBS | RESPIRATION RATE: 19 BRPM

## 2024-04-12 DIAGNOSIS — J45.909 ASTHMA WITHOUT STATUS ASTHMATICUS WITHOUT COMPLICATION, UNSPECIFIED ASTHMA SEVERITY, UNSPECIFIED WHETHER PERSISTENT: Chronic | ICD-10-CM

## 2024-04-12 PROCEDURE — 3074F SYST BP LT 130 MM HG: CPT | Performed by: INTERNAL MEDICINE

## 2024-04-12 PROCEDURE — 99213 OFFICE O/P EST LOW 20 MIN: CPT | Performed by: INTERNAL MEDICINE

## 2024-04-12 PROCEDURE — 3078F DIAST BP <80 MM HG: CPT | Performed by: INTERNAL MEDICINE

## 2024-04-12 RX ORDER — AZITHROMYCIN 250 MG/1
TABLET, FILM COATED ORAL
Qty: 6 TABLET | Refills: 0 | Status: SHIPPED | OUTPATIENT
Start: 2024-04-12

## 2024-04-12 RX ORDER — FLUTICASONE PROPIONATE AND SALMETEROL 500; 50 UG/1; UG/1
1 POWDER RESPIRATORY (INHALATION) 2 TIMES DAILY
Qty: 1 EACH | Refills: 11 | Status: SHIPPED | OUTPATIENT
Start: 2024-04-12

## 2024-04-12 RX ORDER — PREDNISONE 10 MG/1
TABLET ORAL
Qty: 40 TABLET | Refills: 1 | Status: SHIPPED | OUTPATIENT
Start: 2024-04-12

## 2024-04-12 ASSESSMENT — FIBROSIS 4 INDEX: FIB4 SCORE: 1.24

## 2024-04-12 NOTE — PROGRESS NOTES
Pulmonary Clinic follow up    Date of Service: 4/12/2024    Reason for follow up:  Follow-Up (Asthma. Last seen 03/07/2024)      Problem List Items Addressed This Visit       Asthma (Chronic)     Moderate persistent asthma with an FEV1 of 52% FEV1 over FVC 52%.  Recent exacerbation April 2024.  Requesting Advair change from HFA to powder discussed.  This has been changed also on Spiriva.  Has not initiated using the flutter valve as he does have problems expectorating his phlegm.  Flutter valve 3 times daily recommended  Given him a steroid Dosepak and antibiotic pack in case he has an exacerbation on the weekend again.         Relevant Medications    predniSONE (DELTASONE) 10 MG Tab    azithromycin (ZITHROMAX) 250 MG Tab    fluticasone-salmeterol (ADVAIR DISKUS) 500-50 MCG/ACT AEROSOL POWDER, BREATH ACTIVATED         History of Present Illness: Prashant Hewitt is a 63 y.o. male with a past medical history of Moderate persistent asthma who was recently in urgent care 4/7/2024 chest x-ray done no infiltrates or effusion was given doxycycline and prednisone.  I last saw patient 3/7/2024 he is on Advair and Spiriva and at that time was optimized FEV1 of 52% FEV1 over FVC ratio 52%.  We have tried a flutter valve 3 times daily to help control his mucus.]      Patient is doing much better but said he was already better by the time he got to urgent care.  He feels as soon as he changed to the HFA.  He has done worse.  Would like to change back to his Advair discus.  Overall patient feels better last when I saw him he was optimized on his asthma treatment on 3/7/2024.       Review of Systems   Constitutional: Negative.    HENT: Negative.     Eyes: Negative.    Respiratory: Negative.     Cardiovascular: Negative.    Gastrointestinal: Negative.    Genitourinary: Negative.    Musculoskeletal: Negative.    Skin: Negative.    Neurological: Negative.    Endo/Heme/Allergies: Negative.    Psychiatric/Behavioral: Negative.          Current Outpatient Medications on File Prior to Visit   Medication Sig Dispense Refill    tiotropium (SPIRIVA RESPIMAT) 2.5 mcg/Act Aero Soln Inhale 2 Inhalations every day. 1 Each 11    albuterol (PROVENTIL) 2.5mg/3ml Nebu Soln solution for nebulization Take 3 mL by nebulization every 6 hours as needed for Shortness of Breath. 120 Each 0    albuterol (VENTOLIN HFA) 108 (90 Base) MCG/ACT Aero Soln inhalation aerosol Inhale 1 Puff every 6 hours as needed for Shortness of Breath. 2 Each 3    ibuprofen (MOTRIN) 800 MG Tab       ibuprofen (MOTRIN) 800 MG Tab Take 800 mg by mouth.      sildenafil citrate (VIAGRA) 100 MG tablet Take 0.5 Tabs by mouth 1 time daily as needed for Erectile Dysfunction. 30 Tab 2     No current facility-administered medications on file prior to visit.       Social History     Tobacco Use    Smoking status: Never     Passive exposure: Yes    Smokeless tobacco: Never   Vaping Use    Vaping Use: Never used   Substance Use Topics    Alcohol use: Yes     Comment: 1-2 PER WEEK    Drug use: No        Past Medical History:   Diagnosis Date    Asthma without status asthmaticus 05/22/2018    Cancer (HCC) 2017    gist    Chickenpox     Gastrointestinal stromal tumor of stomach (HCC) 04/04/2017 March 2017 18 cm GIST tumor removed Dr. Jin, Dr. Isabel (surgeon)     GERD (gastroesophageal reflux disease)     Mumps     Pneumonia     2017    Snoring     Wheezing        Past Surgical History:   Procedure Laterality Date    SD UPPER GI ENDOSCOPY,BIOPSY  7/16/2019    Procedure: GASTROSCOPY, WITH BIOPSY;  Surgeon: Zhou Miller M.D.;  Location: [facility];  Service: EUS    GASTROSCOPY  7/16/2019    Procedure: GASTROSCOPY;  Surgeon: Zhou Miller M.D.;  Location: [facility];  Service: EUS    EGD W/ENDOSCOPIC ULTRASOUND  7/16/2019    Procedure: EGD, WITH ENDOSCOPIC US - UPPER CURVILINEAR;  Surgeon: Zhou Miller M.D.;  Location: [facility];  Service: EUS     "EGD WITH ASP/BX  7/16/2019    Procedure: EGD, WITH ASPIRATION BIOPSY;  Surgeon: Zhou Miller M.D.;  Location: [facility];  Service: EUS    CHOLECYSTECTOMY      FINGER AMPUTATION Right     right index accident when 3 yo    LAPAROTOMY      OTHER      GIST removal 3/2017    SEPTOPLASTY      SINUSCOPE         Allergies: Penicillins and Succinylcholine    Family History   Problem Relation Age of Onset    Hypertension Mother     Other Mother         glaucoma     Cancer Father         pancreatic     Heart Disease Father     Hypertension Brother        Vitals:    04/12/24 0952   Height: 1.702 m (5' 7\")   Weight: 88.9 kg (196 lb)   Weight % change since last entry.: 0 %   BP: 120/78   Pulse: 79   BMI (Calculated): 30.7   Resp: 19       Physical Examination  Physical Exam  HENT:      Head: Normocephalic and atraumatic.      Mouth/Throat:      Mouth: Mucous membranes are moist.   Eyes:      Extraocular Movements: Extraocular movements intact.      Pupils: Pupils are equal, round, and reactive to light.   Cardiovascular:      Rate and Rhythm: Normal rate.   Pulmonary:      Effort: Pulmonary effort is normal.      Breath sounds: No wheezing.   Musculoskeletal:         General: Normal range of motion.      Cervical back: Normal range of motion.   Skin:     General: Skin is warm and dry.   Neurological:      General: No focal deficit present.      Mental Status: He is alert and oriented to person, place, and time.   Psychiatric:         Mood and Affect: Mood normal.         Behavior: Behavior normal.         Thought Content: Thought content normal.         Judgment: Judgment normal.            Frances Mckeon M.D., MD MPH [facility]  [facility]  "

## 2024-04-15 ASSESSMENT — ENCOUNTER SYMPTOMS
CONSTITUTIONAL NEGATIVE: 1
MUSCULOSKELETAL NEGATIVE: 1
GASTROINTESTINAL NEGATIVE: 1
PSYCHIATRIC NEGATIVE: 1
NEUROLOGICAL NEGATIVE: 1
EYES NEGATIVE: 1
CARDIOVASCULAR NEGATIVE: 1
RESPIRATORY NEGATIVE: 1

## 2024-04-15 NOTE — ASSESSMENT & PLAN NOTE
Moderate persistent asthma with an FEV1 of 52% FEV1 over FVC 52%.  Recent exacerbation April 2024.  Requesting Advair change from HFA to powder discussed.  This has been changed also on Spiriva.  Has not initiated using the flutter valve as he does have problems expectorating his phlegm.  Flutter valve 3 times daily recommended  Given him a steroid Dosepak and antibiotic pack in case he has an exacerbation on the weekend again.

## 2024-05-22 ENCOUNTER — HOSPITAL ENCOUNTER (OUTPATIENT)
Dept: LAB | Facility: MEDICAL CENTER | Age: 64
End: 2024-05-22
Attending: INTERNAL MEDICINE
Payer: COMMERCIAL

## 2024-05-22 LAB
ALBUMIN SERPL BCP-MCNC: 4.1 G/DL (ref 3.2–4.9)
ALBUMIN/GLOB SERPL: 1.6 G/DL
ALP SERPL-CCNC: 88 U/L (ref 30–99)
ALT SERPL-CCNC: 30 U/L (ref 2–50)
ANION GAP SERPL CALC-SCNC: 9 MMOL/L (ref 7–16)
AST SERPL-CCNC: 19 U/L (ref 12–45)
BASOPHILS # BLD AUTO: 0.4 % (ref 0–1.8)
BASOPHILS # BLD: 0.02 K/UL (ref 0–0.12)
BILIRUB SERPL-MCNC: 0.6 MG/DL (ref 0.1–1.5)
BUN SERPL-MCNC: 10 MG/DL (ref 8–22)
CALCIUM ALBUM COR SERPL-MCNC: 9.1 MG/DL (ref 8.5–10.5)
CALCIUM SERPL-MCNC: 9.2 MG/DL (ref 8.5–10.5)
CHLORIDE SERPL-SCNC: 107 MMOL/L (ref 96–112)
CO2 SERPL-SCNC: 23 MMOL/L (ref 20–33)
CREAT SERPL-MCNC: 0.78 MG/DL (ref 0.5–1.4)
EOSINOPHIL # BLD AUTO: 0.26 K/UL (ref 0–0.51)
EOSINOPHIL NFR BLD: 5.3 % (ref 0–6.9)
ERYTHROCYTE [DISTWIDTH] IN BLOOD BY AUTOMATED COUNT: 40.9 FL (ref 35.9–50)
GFR SERPLBLD CREATININE-BSD FMLA CKD-EPI: 100 ML/MIN/1.73 M 2
GLOBULIN SER CALC-MCNC: 2.5 G/DL (ref 1.9–3.5)
GLUCOSE SERPL-MCNC: 102 MG/DL (ref 65–99)
HCT VFR BLD AUTO: 45.4 % (ref 42–52)
HGB BLD-MCNC: 15.6 G/DL (ref 14–18)
IMM GRANULOCYTES # BLD AUTO: 0.02 K/UL (ref 0–0.11)
IMM GRANULOCYTES NFR BLD AUTO: 0.4 % (ref 0–0.9)
LYMPHOCYTES # BLD AUTO: 1.2 K/UL (ref 1–4.8)
LYMPHOCYTES NFR BLD: 24.4 % (ref 22–41)
MCH RBC QN AUTO: 29.5 PG (ref 27–33)
MCHC RBC AUTO-ENTMCNC: 34.4 G/DL (ref 32.3–36.5)
MCV RBC AUTO: 86 FL (ref 81.4–97.8)
MONOCYTES # BLD AUTO: 0.44 K/UL (ref 0–0.85)
MONOCYTES NFR BLD AUTO: 8.9 % (ref 0–13.4)
NEUTROPHILS # BLD AUTO: 2.98 K/UL (ref 1.82–7.42)
NEUTROPHILS NFR BLD: 60.6 % (ref 44–72)
NRBC # BLD AUTO: 0 K/UL
NRBC BLD-RTO: 0 /100 WBC (ref 0–0.2)
PLATELET # BLD AUTO: 208 K/UL (ref 164–446)
PMV BLD AUTO: 10.7 FL (ref 9–12.9)
POTASSIUM SERPL-SCNC: 4 MMOL/L (ref 3.6–5.5)
PROT SERPL-MCNC: 6.6 G/DL (ref 6–8.2)
RBC # BLD AUTO: 5.28 M/UL (ref 4.7–6.1)
SODIUM SERPL-SCNC: 139 MMOL/L (ref 135–145)
WBC # BLD AUTO: 4.9 K/UL (ref 4.8–10.8)

## 2024-05-24 ENCOUNTER — HOSPITAL ENCOUNTER (OUTPATIENT)
Dept: RADIOLOGY | Facility: MEDICAL CENTER | Age: 64
End: 2024-05-24
Attending: INTERNAL MEDICINE
Payer: COMMERCIAL

## 2024-05-24 DIAGNOSIS — C49.A2 GASTRIC STROMAL TUMOR (HCC): ICD-10-CM

## 2024-05-24 DIAGNOSIS — R63.4 ABNORMAL WEIGHT LOSS: ICD-10-CM

## 2024-05-24 DIAGNOSIS — C49.A9 GASTROINTESTINAL STROMAL TUMOR OF OTHER SITES (HCC): ICD-10-CM

## 2024-05-24 DIAGNOSIS — D64.9 ANEMIA, UNSPECIFIED TYPE: ICD-10-CM

## 2024-05-24 RX ADMIN — IOHEXOL 100 ML: 350 INJECTION, SOLUTION INTRAVENOUS at 15:31

## 2024-05-24 RX ADMIN — IOHEXOL 25 ML: 240 INJECTION, SOLUTION INTRATHECAL; INTRAVASCULAR; INTRAVENOUS; ORAL at 15:31

## 2024-08-23 ENCOUNTER — DOCUMENTATION (OUTPATIENT)
Dept: HEALTH INFORMATION MANAGEMENT | Facility: OTHER | Age: 64
End: 2024-08-23
Payer: COMMERCIAL

## 2024-09-26 ENCOUNTER — OFFICE VISIT (OUTPATIENT)
Dept: URGENT CARE | Facility: PHYSICIAN GROUP | Age: 64
End: 2024-09-26
Payer: COMMERCIAL

## 2024-09-26 VITALS
WEIGHT: 196 LBS | BODY MASS INDEX: 30.76 KG/M2 | HEIGHT: 67 IN | DIASTOLIC BLOOD PRESSURE: 76 MMHG | HEART RATE: 85 BPM | SYSTOLIC BLOOD PRESSURE: 124 MMHG | OXYGEN SATURATION: 94 % | RESPIRATION RATE: 16 BRPM | TEMPERATURE: 97.8 F

## 2024-09-26 DIAGNOSIS — J22 NOSOCOMIAL INFECTION OF LOWER RESPIRATORY TRACT: ICD-10-CM

## 2024-09-26 PROCEDURE — 99213 OFFICE O/P EST LOW 20 MIN: CPT | Performed by: NURSE PRACTITIONER

## 2024-09-26 PROCEDURE — 3074F SYST BP LT 130 MM HG: CPT | Performed by: NURSE PRACTITIONER

## 2024-09-26 PROCEDURE — 3078F DIAST BP <80 MM HG: CPT | Performed by: NURSE PRACTITIONER

## 2024-09-26 RX ORDER — PREDNISONE 10 MG/1
40 TABLET ORAL DAILY
Qty: 20 TABLET | Refills: 0 | Status: SHIPPED | OUTPATIENT
Start: 2024-09-26 | End: 2024-10-01

## 2024-09-26 RX ORDER — DOXYCYCLINE HYCLATE 100 MG
100 TABLET ORAL 2 TIMES DAILY
Qty: 14 TABLET | Refills: 0 | Status: SHIPPED | OUTPATIENT
Start: 2024-09-26 | End: 2024-10-03

## 2024-09-26 ASSESSMENT — FIBROSIS 4 INDEX: FIB4 SCORE: 1.05

## 2024-09-26 NOTE — PROGRESS NOTES
"Verbal consent was acquired by the patient to use [employer] ambient listening note generation during this visit.    Subjective:     HPI:   History of Present Illness  The patient is a 63-year-old male who presents for evaluation of asthma and productive cough x one month.     He has been living with asthma for a significant portion of his life. He uses inhalers daily and often takes albuterol before using other medications to ensure his airways are open. He was previously prescribed doxycycline and azithromycin by a pulmonologist this year for similar symptoms. He used his nebulizer at home two days ago. He had a chest x-ray in March 2024 and undergoes annual CAT scans due to a previous cancer diagnosis. He has lung nodules on both sides, predominantly on the right.    Over the past year, he has experienced recurrent lung and sinus infections, leading to congestion and a persistent cough lasting 2 to 3 weeks. The cough produces yellow, green, and brown phlegm. He describes the sensation in his sinuses as akin to being struck with an ice pick. Despite taking antibiotics three times this year, he still experiences residual symptoms. He expresses concern about the possibility of developing antibiotic resistance.     Currently, he is coughing up phlegm and has experienced a few hot flashes, but no fever. He believes the issue is localized to the right side. He does not experience pain when taking deep breaths. His sinus congestion is less severe than expected given the intensity of the pain. Coughing triggers a sharp, stabbing pain on the right side of his head, while the left side remains unaffected. This has been ongoing for approximately 3 weeks. He has developed a headache since the onset of his cough. No body aches or chills.     ALLERGIES  He is allergic to PENICILLIN.      Objective:     Exam:  /76   Pulse 85   Temp 36.6 °C (97.8 °F) (Temporal)   Resp 16   Ht 1.702 m (5' 7\")   Wt 88.9 kg (196 lb)   " SpO2 94%   BMI 30.70 kg/m²  Body mass index is 30.7 kg/m².    Physical Exam  Vitals and nursing note reviewed.   Constitutional:       General: He is not in acute distress.     Appearance: Normal appearance. He is not ill-appearing.   HENT:      Head: Normocephalic and atraumatic.      Nose: Congestion present. No rhinorrhea.      Mouth/Throat:      Mouth: Mucous membranes are moist.      Pharynx: No oropharyngeal exudate or posterior oropharyngeal erythema.   Eyes:      Extraocular Movements: Extraocular movements intact.      Conjunctiva/sclera: Conjunctivae normal.      Pupils: Pupils are equal, round, and reactive to light.   Cardiovascular:      Rate and Rhythm: Normal rate.      Pulses: Normal pulses.   Pulmonary:      Effort: Pulmonary effort is normal.      Breath sounds: Examination of the right-upper field reveals decreased breath sounds. Examination of the right-middle field reveals decreased breath sounds and wheezing. Examination of the right-lower field reveals decreased breath sounds and wheezing. Decreased breath sounds and wheezing present.   Musculoskeletal:      Cervical back: Normal range of motion.   Neurological:      Mental Status: He is alert.           Assessment & Plan:     1. Nosocomial infection of lower respiratory tract  predniSONE (DELTASONE) 10 MG Tab    doxycycline (VIBRAMYCIN) 100 MG Tab          Assessment & Plan  1. LRTI, acute.  Due to duration of symptoms, underlying asthma, and failure of OTC therapies, antibiotic was written for treatment of bacterial etiology for lower respiratory tract infection. Continue OTC supportive therapies. Flonase, OTC allergy meds, avoid night time dairy, increased fluids and humidification recommended. Patient given precautionary s/sx that mandate immediate follow up and evaluation in the ED. Advised of risks of not doing so. DDX, Supportive care, and indications for immediate follow-up discussed with patient.  Instructed to return to clinic or  nearest emergency department if we are not available for any change in condition, further concerns, or worsening of symptoms.    The patient demonstrated a good understanding and agreed with the treatment plan              INNA Justin.      Please note that this dictation was created using voice recognition software. I have made every reasonable attempt to correct obvious errors, but I expect that there are errors of grammar and possibly content that I did not discover before finalizing the note.

## 2024-10-24 DIAGNOSIS — J45.909 ASTHMA WITHOUT STATUS ASTHMATICUS WITHOUT COMPLICATION, UNSPECIFIED ASTHMA SEVERITY, UNSPECIFIED WHETHER PERSISTENT: Chronic | ICD-10-CM

## 2024-10-24 RX ORDER — ALBUTEROL SULFATE 0.83 MG/ML
SOLUTION RESPIRATORY (INHALATION)
Qty: 360 ML | Refills: 0 | Status: SHIPPED | OUTPATIENT
Start: 2024-10-24

## 2024-10-28 ENCOUNTER — OFFICE VISIT (OUTPATIENT)
Dept: URGENT CARE | Facility: PHYSICIAN GROUP | Age: 64
End: 2024-10-28
Payer: COMMERCIAL

## 2024-10-28 VITALS
HEART RATE: 80 BPM | SYSTOLIC BLOOD PRESSURE: 132 MMHG | TEMPERATURE: 97.9 F | DIASTOLIC BLOOD PRESSURE: 86 MMHG | BODY MASS INDEX: 30.65 KG/M2 | WEIGHT: 202.2 LBS | RESPIRATION RATE: 14 BRPM | OXYGEN SATURATION: 96 % | HEIGHT: 68 IN

## 2024-10-28 DIAGNOSIS — R05.3 CHRONIC COUGH: ICD-10-CM

## 2024-10-28 PROCEDURE — 99214 OFFICE O/P EST MOD 30 MIN: CPT | Performed by: PHYSICIAN ASSISTANT

## 2024-10-28 PROCEDURE — 3075F SYST BP GE 130 - 139MM HG: CPT | Performed by: PHYSICIAN ASSISTANT

## 2024-10-28 PROCEDURE — 3079F DIAST BP 80-89 MM HG: CPT | Performed by: PHYSICIAN ASSISTANT

## 2024-10-28 RX ORDER — AZITHROMYCIN 250 MG/1
TABLET, FILM COATED ORAL
Qty: 6 TABLET | Refills: 0 | Status: SHIPPED | OUTPATIENT
Start: 2024-10-28

## 2024-10-28 RX ORDER — METHYLPREDNISOLONE 4 MG/1
4 TABLET ORAL DAILY
Qty: 21 TABLET | Refills: 0 | Status: SHIPPED | OUTPATIENT
Start: 2024-10-28

## 2024-10-28 RX ORDER — CEFUROXIME AXETIL 500 MG/1
500 TABLET ORAL 2 TIMES DAILY
Qty: 10 TABLET | Refills: 0 | Status: SHIPPED | OUTPATIENT
Start: 2024-10-28 | End: 2024-11-02

## 2024-10-28 ASSESSMENT — ENCOUNTER SYMPTOMS
DIZZINESS: 0
CHILLS: 0
EYE REDNESS: 0
FEVER: 0
HEADACHES: 1
EYE DISCHARGE: 0
SINUS PAIN: 0
DIAPHORESIS: 0
CONSTIPATION: 0
WHEEZING: 0
DIARRHEA: 0
EYE PAIN: 0
NAUSEA: 0
SORE THROAT: 0
SHORTNESS OF BREATH: 0
COUGH: 1
ABDOMINAL PAIN: 0
VOMITING: 0

## 2024-10-28 ASSESSMENT — FIBROSIS 4 INDEX: FIB4 SCORE: 1.05

## 2024-11-05 ENCOUNTER — OFFICE VISIT (OUTPATIENT)
Dept: MEDICAL GROUP | Facility: PHYSICIAN GROUP | Age: 64
End: 2024-11-05
Payer: COMMERCIAL

## 2024-11-05 VITALS
OXYGEN SATURATION: 96 % | SYSTOLIC BLOOD PRESSURE: 142 MMHG | TEMPERATURE: 97.7 F | RESPIRATION RATE: 14 BRPM | HEART RATE: 70 BPM | HEIGHT: 67 IN | WEIGHT: 199 LBS | DIASTOLIC BLOOD PRESSURE: 90 MMHG | BODY MASS INDEX: 31.23 KG/M2

## 2024-11-05 DIAGNOSIS — C49.A2 GASTROINTESTINAL STROMAL TUMOR OF STOMACH (HCC): ICD-10-CM

## 2024-11-05 DIAGNOSIS — J45.41 MODERATE PERSISTENT ASTHMA WITH ACUTE EXACERBATION: ICD-10-CM

## 2024-11-05 DIAGNOSIS — Z71.9 ENCOUNTER FOR CONSULTATION: ICD-10-CM

## 2024-11-05 PROCEDURE — 3077F SYST BP >= 140 MM HG: CPT | Performed by: PHYSICIAN ASSISTANT

## 2024-11-05 PROCEDURE — 99214 OFFICE O/P EST MOD 30 MIN: CPT | Performed by: PHYSICIAN ASSISTANT

## 2024-11-05 PROCEDURE — 3080F DIAST BP >= 90 MM HG: CPT | Performed by: PHYSICIAN ASSISTANT

## 2024-11-05 ASSESSMENT — FIBROSIS 4 INDEX: FIB4 SCORE: 1.05

## 2024-11-06 NOTE — PROGRESS NOTES
"Verbal consent was acquired by the patient to use [employer] ambient listening note generation during this visit     Subjective:     HPI:   History of Present Illness  The patient is a 63-year-old male who presents for evaluation of a chronic cough.    He reports a persistent cough that has been present for the entire year, with episodes occurring 4 to 6 times. The cough is described as hacking and productive, with yellow phlegm. It seems to originate from his lower right lung and right sinuses. Despite multiple rounds of antibiotics and steroids, the cough persists. He also reports wheezing and postnasal drip.    He has a long-standing history of asthma and respiratory issues since the age of 3. He is under the care of a pulmonologist and is on Advair twice daily for his asthma. He occasionally uses albuterol, depending on the severity of his symptoms. He has tried Spiriva, but it was not as effective as Advair.    He has a history of stomach cancer, which led to the removal of his stomach in 2017. He reports frequent vomiting, which he attributes to his partial stomach. He has an appointment scheduled with a gastroenterologist in February 2025. He does not experience heartburn.    He has had CT scans twice a year due to his cancer history. He also reports night sweats and has received a TB vaccine in the past.    Health Maintenance: Completed    ROS:  Gen: no fevers/chills  Eyes: no changes in vision  ENT: no sore throat  Pulm: no sob, pos for cough  CV: no chest pain  GI: no nausea/vomiting  : no dysuria  MSk: no myalgias  Skin: no rash  Neuro: no headaches    Objective:     Exam:  BP (!) 142/90   Pulse 70   Temp 36.5 °C (97.7 °F) (Temporal)   Resp 14   Ht 1.702 m (5' 7\")   Wt 90.3 kg (199 lb)   SpO2 96%   BMI 31.17 kg/m²  Body mass index is 31.17 kg/m².    PHYSICAL EXAM  Constitutional: Alert, no distress, well-groomed.  Skin: Warm, dry, good turgor, no rashes in visible areas.  Eye: Equal, round and " reactive, conjunctiva clear, lids normal.  ENMT: Lips without lesions, good dentition, moist mucous membranes.  Neck: Trachea midline, no masses, no thyromegaly.  Respiratory: Unlabored respiratory effort, cough present, expiratory wheezing throughout  MSK: Normal gait, moves all extremities.  Neuro: Grossly non-focal.   Psych: Alert and oriented x3, normal affect and mood.    Results  Imaging reviewed     Assessment & Plan:     1. Moderate persistent asthma with acute exacerbation        2. Gastrointestinal stromal tumor of stomach (HCC)        3. Encounter for consultation  E-consult to Pulmonology/Sleep        Assessment & Plan  1. Chronic cough.  Chronic, worsening.  Patient's chronic cough is likely due to poorly controlled asthma.  Patient is currently on his Advair as prescribed and is using albuterol daily.  Previously had a prescription for Spiriva but states he is not using this anymore as it was not helpful.  Strongly recommended that the patient follow-up with pulmonology, particularly given how many rounds of antibiotics and steroids he has had over the last couple of months.  An e-consult to pulmonology will be sent for further evaluation and management until the patient can get an appointment. The pulmonologist's recommendations will be communicated to him in the coming days.     3. History of stomach cancer.  He had his stomach removed in 2017 and experiences frequent vomiting, especially when eating slightly more than usual. He does not have a gastroenterologist. A referral to gastroenterology for further evaluation, including the possibility of an endoscopy, is recommended given his abnormal findings on his CT scan from May.  Patient states he has an appointment with GI scheduled already.            I spent a total of 35 minutes with record review, exam, communication with the patient, communication with other providers, and documentation of this encounter.    Please note that this dictation was  created using voice recognition software. I have made every reasonable attempt to correct obvious errors, but I expect that there are errors of grammar and possibly content that I did not discover before finalizing the note.

## 2024-12-20 ENCOUNTER — TELEPHONE (OUTPATIENT)
Dept: SLEEP MEDICINE | Facility: MEDICAL CENTER | Age: 64
End: 2024-12-20
Payer: COMMERCIAL

## 2024-12-21 NOTE — TELEPHONE ENCOUNTER
VOICEMAIL  1. Caller Name: Prashant                      Call Back Number: [phone]    2. Message: Pt called and lm. He has an appt on Febraurybut would like to be seen soon. He has been getting sick very often. He would go to an UC and get abx, then get better then get sick again. Pt would like l/w or a cxr to see what's going on when he is sick. Pt is currently coughing green phlegm.     3. Patient approves office to leave a detailed voicemail/[employer] message: N\A    12/20/24:  Called and spoke with pt. Informed pt I received his message and will forward this to the provider.   Pt okay for a [employer] message to be sent.

## 2024-12-28 ENCOUNTER — OFFICE VISIT (OUTPATIENT)
Dept: URGENT CARE | Facility: PHYSICIAN GROUP | Age: 64
End: 2024-12-28
Payer: COMMERCIAL

## 2024-12-28 VITALS
BODY MASS INDEX: 30.83 KG/M2 | TEMPERATURE: 97.4 F | RESPIRATION RATE: 20 BRPM | OXYGEN SATURATION: 94 % | DIASTOLIC BLOOD PRESSURE: 74 MMHG | HEART RATE: 80 BPM | WEIGHT: 196.4 LBS | HEIGHT: 67 IN | SYSTOLIC BLOOD PRESSURE: 122 MMHG

## 2024-12-28 DIAGNOSIS — J45.41 MODERATE PERSISTENT ASTHMA WITH EXACERBATION: ICD-10-CM

## 2024-12-28 PROCEDURE — 99214 OFFICE O/P EST MOD 30 MIN: CPT | Performed by: PHYSICIAN ASSISTANT

## 2024-12-28 PROCEDURE — 3078F DIAST BP <80 MM HG: CPT | Performed by: PHYSICIAN ASSISTANT

## 2024-12-28 PROCEDURE — 3074F SYST BP LT 130 MM HG: CPT | Performed by: PHYSICIAN ASSISTANT

## 2024-12-28 RX ORDER — BENZONATATE 200 MG/1
200 CAPSULE ORAL 3 TIMES DAILY PRN
Qty: 30 CAPSULE | Refills: 0 | Status: SHIPPED | OUTPATIENT
Start: 2024-12-28

## 2024-12-28 RX ORDER — DOXYCYCLINE HYCLATE 100 MG
100 TABLET ORAL 2 TIMES DAILY
Qty: 20 TABLET | Refills: 0 | Status: SHIPPED | OUTPATIENT
Start: 2024-12-28 | End: 2025-01-07

## 2024-12-28 RX ORDER — PREDNISONE 10 MG/1
TABLET ORAL
Qty: 32 TABLET | Refills: 0 | Status: SHIPPED | OUTPATIENT
Start: 2024-12-28

## 2024-12-28 ASSESSMENT — FIBROSIS 4 INDEX: FIB4 SCORE: 1.07

## 2024-12-30 ASSESSMENT — ENCOUNTER SYMPTOMS
VOMITING: 0
HEADACHES: 0
DIARRHEA: 0
MYALGIAS: 1
SHORTNESS OF BREATH: 1
HEMOPTYSIS: 0
FEVER: 0
CHILLS: 0
RHINORRHEA: 0
SORE THROAT: 0
ABDOMINAL PAIN: 0
SPUTUM PRODUCTION: 1
NAUSEA: 0
COUGH: 1
WHEEZING: 1

## 2024-12-30 NOTE — PROGRESS NOTES
Subjective     Prashant Hewitt is a 64 y.o. male who presents with Cough (X3 weeks Fatigue, coughing really bad, 7th visit here for same problem. )            Cough  This is a new problem. Episode onset: 3 weeks. The problem has been unchanged. The problem occurs constantly. The cough is Productive of sputum. Associated symptoms include chest pain, myalgias, shortness of breath and wheezing. Pertinent negatives include no chills, ear pain, fever, headaches, hemoptysis, nasal congestion, postnasal drip, rhinorrhea or sore throat. The symptoms are aggravated by lying down. He has tried OTC cough suppressant, a beta-agonist inhaler and steroid inhaler for the symptoms. The treatment provided no relief. His past medical history is significant for asthma.     Past Medical History:   Diagnosis Date    Asthma without status asthmaticus 05/22/2018    Cancer (HCC) 2017    gist    Chickenpox     Gastrointestinal stromal tumor of stomach (HCC) 04/04/2017 March 2017 18 cm GIST tumor removed Dr. Jin, Dr. Isabel (surgeon)     GERD (gastroesophageal reflux disease)     Mumps     Pneumonia     2017    Snoring     Wheezing          Past Surgical History:   Procedure Laterality Date    NE UPPER GI ENDOSCOPY,BIOPSY  7/16/2019    Procedure: GASTROSCOPY, WITH BIOPSY;  Surgeon: Zhou Miller M.D.;  Location: [facility];  Service: EUS    GASTROSCOPY  7/16/2019    Procedure: GASTROSCOPY;  Surgeon: Zhou Miller M.D.;  Location: [facility];  Service: EUS    EGD W/ENDOSCOPIC ULTRASOUND  7/16/2019    Procedure: EGD, WITH ENDOSCOPIC US - UPPER CURVILINEAR;  Surgeon: Zhou Miller M.D.;  Location: [facility];  Service: EUS    EGD WITH ASP/BX  7/16/2019    Procedure: EGD, WITH ASPIRATION BIOPSY;  Surgeon: Zhou Miller M.D.;  Location: [facility];  Service: EUS    CHOLECYSTECTOMY      FINGER AMPUTATION Right     right index accident when 5 yo    LAPAROTOMY      OTHER      GIST  "removal 3/2017    SEPTOPLASTY      SINUSCOPE           Family History   Problem Relation Age of Onset    Hypertension Mother     Other Mother         glaucoma     Cancer Father         pancreatic     Heart Disease Father     Hypertension Brother        Allergies:  Penicillins and Succinylcholine      Medications, Allergies, and current problem list reviewed today in Epic    Review of Systems   Constitutional:  Positive for malaise/fatigue. Negative for chills and fever.   HENT:  Negative for congestion, ear pain, postnasal drip, rhinorrhea and sore throat.    Respiratory:  Positive for cough, sputum production, shortness of breath and wheezing. Negative for hemoptysis.    Cardiovascular:  Positive for chest pain. Negative for leg swelling.   Gastrointestinal:  Negative for abdominal pain, diarrhea, nausea and vomiting.   Musculoskeletal:  Positive for myalgias.   Neurological:  Negative for headaches.        All other systems reviewed and are negative.         Objective     /74 (BP Location: Left arm, Patient Position: Sitting, BP Cuff Size: Adult)   Pulse 80   Temp 36.3 °C (97.4 °F)   Resp 20   Ht 1.702 m (5' 7\")   Wt 89.1 kg (196 lb 6.4 oz)   SpO2 94%   BMI 30.76 kg/m²      Physical Exam  Constitutional:       General: He is not in acute distress.     Appearance: He is not ill-appearing.   HENT:      Head: Normocephalic and atraumatic.      Nose: Nose normal.      Mouth/Throat:      Mouth: Mucous membranes are moist.      Pharynx: No posterior oropharyngeal erythema.   Eyes:      Conjunctiva/sclera: Conjunctivae normal.   Cardiovascular:      Rate and Rhythm: Normal rate and regular rhythm.      Heart sounds: Normal heart sounds. No murmur heard.  Pulmonary:      Effort: Pulmonary effort is normal. No respiratory distress.      Breath sounds: No stridor. Wheezing and rhonchi present. No rales.   Skin:     General: Skin is warm and dry.      Findings: No rash.   Neurological:      General: No focal " deficit present.      Mental Status: He is alert and oriented to person, place, and time.   Psychiatric:         Mood and Affect: Mood normal.         Behavior: Behavior normal.         Thought Content: Thought content normal.         Judgment: Judgment normal.                             Assessment & Plan        Assessment & Plan  Moderate persistent asthma with exacerbation    Orders:    doxycycline (VIBRAMYCIN) 100 MG Tab; Take 1 Tablet by mouth 2 times a day for 10 days.    predniSONE (DELTASONE) 10 MG Tab; 4 tabs po daily x 3 days, then 3 tabs po daily x 3 days, then 2 tabs po daily x 3 days, then 1 tab po daily x 3 days, 0.5 tabs po daily x 3 days.    benzonatate (TESSALON) 200 MG capsule; Take 1 Capsule by mouth 3 times a day as needed for Cough.     Differential diagnoses, Supportive care, and indications for immediate follow-up discussed with patient.   Pathogenesis of diagnosis discussed including typical length and natural progression.   Instructed to return to clinic or nearest emergency department for any change in condition, further concerns, or worsening of symptoms.      The patient demonstrated a good understanding and agreed with the treatment plan.  Bonnie Tucker P.A.-C.

## 2025-01-07 NOTE — TELEPHONE ENCOUNTER
Becky Morley; Frances Mckeon M.D.; Rosi Clark1 days ago     RS  We currently don't have any appointments available until February 2025.

## 2025-01-29 ENCOUNTER — OFFICE VISIT (OUTPATIENT)
Dept: SLEEP MEDICINE | Facility: MEDICAL CENTER | Age: 65
End: 2025-01-29
Attending: INTERNAL MEDICINE
Payer: COMMERCIAL

## 2025-01-29 ENCOUNTER — HOSPITAL ENCOUNTER (OUTPATIENT)
Dept: LAB | Facility: MEDICAL CENTER | Age: 65
End: 2025-01-29
Attending: INTERNAL MEDICINE
Payer: COMMERCIAL

## 2025-01-29 VITALS
OXYGEN SATURATION: 96 % | BODY MASS INDEX: 31.39 KG/M2 | DIASTOLIC BLOOD PRESSURE: 68 MMHG | HEIGHT: 67 IN | SYSTOLIC BLOOD PRESSURE: 130 MMHG | WEIGHT: 200 LBS | HEART RATE: 72 BPM

## 2025-01-29 DIAGNOSIS — R93.3 ABNORMAL CT SCAN, ESOPHAGUS: ICD-10-CM

## 2025-01-29 DIAGNOSIS — J82.83 EOSINOPHILIC ASTHMA: ICD-10-CM

## 2025-01-29 LAB
BASOPHILS # BLD AUTO: 0.2 % (ref 0–1.8)
BASOPHILS # BLD: 0.01 K/UL (ref 0–0.12)
EOSINOPHIL # BLD AUTO: 0.27 K/UL (ref 0–0.51)
EOSINOPHIL NFR BLD: 5.1 % (ref 0–6.9)
ERYTHROCYTE [DISTWIDTH] IN BLOOD BY AUTOMATED COUNT: 40.5 FL (ref 35.9–50)
HCT VFR BLD AUTO: 43.8 % (ref 42–52)
HGB BLD-MCNC: 14.7 G/DL (ref 14–18)
IMM GRANULOCYTES # BLD AUTO: 0.02 K/UL (ref 0–0.11)
IMM GRANULOCYTES NFR BLD AUTO: 0.4 % (ref 0–0.9)
LYMPHOCYTES # BLD AUTO: 1.44 K/UL (ref 1–4.8)
LYMPHOCYTES NFR BLD: 27.2 % (ref 22–41)
MCH RBC QN AUTO: 29.2 PG (ref 27–33)
MCHC RBC AUTO-ENTMCNC: 33.6 G/DL (ref 32.3–36.5)
MCV RBC AUTO: 87.1 FL (ref 81.4–97.8)
MONOCYTES # BLD AUTO: 0.6 K/UL (ref 0–0.85)
MONOCYTES NFR BLD AUTO: 11.3 % (ref 0–13.4)
NEUTROPHILS # BLD AUTO: 2.96 K/UL (ref 1.82–7.42)
NEUTROPHILS NFR BLD: 55.8 % (ref 44–72)
NRBC # BLD AUTO: 0 K/UL
NRBC BLD-RTO: 0 /100 WBC (ref 0–0.2)
PLATELET # BLD AUTO: 245 K/UL (ref 164–446)
PMV BLD AUTO: 10.6 FL (ref 9–12.9)
RBC # BLD AUTO: 5.03 M/UL (ref 4.7–6.1)
WBC # BLD AUTO: 5.3 K/UL (ref 4.8–10.8)

## 2025-01-29 PROCEDURE — 99213 OFFICE O/P EST LOW 20 MIN: CPT | Performed by: INTERNAL MEDICINE

## 2025-01-29 PROCEDURE — 86635 COCCIDIOIDES ANTIBODY: CPT | Mod: 91

## 2025-01-29 PROCEDURE — 85025 COMPLETE CBC W/AUTO DIFF WBC: CPT

## 2025-01-29 PROCEDURE — 36415 COLL VENOUS BLD VENIPUNCTURE: CPT

## 2025-01-29 ASSESSMENT — ENCOUNTER SYMPTOMS
COUGH: 1
WHEEZING: 1
SHORTNESS OF BREATH: 1

## 2025-01-29 ASSESSMENT — FIBROSIS 4 INDEX: FIB4 SCORE: 1.07

## 2025-01-29 NOTE — PROGRESS NOTES
Pulmonary Clinic Note    Chief Complaint:  Chief Complaint   Patient presents with    Follow-Up     Last seen 04/12/24 w/ Dr. Das  Asthma       HPI:     Prashant Hewitt is a very pleasant 64 y.o. male never smoker returns to clinic for follow-up of asthma.    Advair 500, Spiriva Respimat, albuterol HFA/nebs    Lifelong asthmatic since 3yoa  Skin prick testing as a child was negative, no allergy testing as an adult  +eosinophilia (9.1%,  in 2021)  No IgE on file    2024: FVC 2.52 L, 62%; FEV1 1.44 L, 45%; ratio 57%; no BD response; TLC 6.17 L, 96%, + air trapping; DLCO 131%  2019: FVC 3.28 L, 76%; FEV1 1.66 L, 50%; ratio 51%; no BD response  2018: FVC 3.34 L, 76%; FEV1 1.66 L, 50%; ratio 50%; no BD response; TLC 6.66 L, 104%, + air trapping; DLCO 131%    CT chest 5/2024: Personally reviewed; trace stable reticulonodular opacities within the lungs.  Stable 4 mm right apical nodule.  Distal esophageal circumferential wall thickening.    Interval events since last clinic visit in 4/2024 with Dr. Mckeon:  7+ exacerbations in the last year  Compliant with Advair, not consistently using spiriva  Continued frequent albuterol use    Past Medical History:   Diagnosis Date    Asthma without status asthmaticus 05/22/2018    Cancer (HCC) 2017    gist    Chickenpox     Gastrointestinal stromal tumor of stomach (HCC) 04/04/2017 March 2017 18 cm GIST tumor removed Dr. Jin, Dr. Isabel (surgeon)     GERD (gastroesophageal reflux disease)     Mumps     Pneumonia     2017    Shortness of breath     Snoring        Past Surgical History:   Procedure Laterality Date    MO UPPER GI ENDOSCOPY,BIOPSY  7/16/2019    Procedure: GASTROSCOPY, WITH BIOPSY;  Surgeon: Zhou Miller M.D.;  Location: [facility];  Service: EUS    GASTROSCOPY  7/16/2019    Procedure: GASTROSCOPY;  Surgeon: Zhou Miller M.D.;  Location: [facility];  Service: EUS    EGD W/ENDOSCOPIC ULTRASOUND  7/16/2019    Procedure:  EGD, WITH ENDOSCOPIC US - UPPER CURVILINEAR;  Surgeon: Zhou Miller M.D.;  Location: [facility];  Service: EUS    EGD WITH ASP/BX  7/16/2019    Procedure: EGD, WITH ASPIRATION BIOPSY;  Surgeon: Zhou Miller M.D.;  Location: [facility];  Service: EUS    CHOLECYSTECTOMY      FINGER AMPUTATION Right     right index accident when 5 yo    LAPAROTOMY      OTHER      GIST removal 3/2017    SEPTOPLASTY      SINUSCOPE         Social History     Socioeconomic History    Marital status:      Spouse name: Not on file    Number of children: Not on file    Years of education: Not on file    Highest education level: Not on file   Occupational History    Not on file   Tobacco Use    Smoking status: Never     Passive exposure: Yes    Smokeless tobacco: Never   Vaping Use    Vaping status: Never Used   Substance and Sexual Activity    Alcohol use: Yes     Comment: Everyday    Drug use: No    Sexual activity: Yes     Partners: Female   Other Topics Concern    Not on file   Social History Narrative    ; owns own business      Social Drivers of Health     Financial Resource Strain: Not on file   Food Insecurity: Not on file   Transportation Needs: Not on file   Physical Activity: Not on file   Stress: Not on file   Social Connections: Not on file   Intimate Partner Violence: Not on file   Housing Stability: Not on file          Family History   Problem Relation Age of Onset    Hypertension Mother     Other Mother         glaucoma     Cancer Father         pancreatic     Heart Disease Father     Hypertension Brother        Current Outpatient Medications on File Prior to Visit   Medication Sig Dispense Refill    predniSONE (DELTASONE) 10 MG Tab 4 tabs po daily x 3 days, then 3 tabs po daily x 3 days, then 2 tabs po daily x 3 days, then 1 tab po daily x 3 days, 0.5 tabs po daily x 3 days. 32 Tablet 0    azithromycin (ZITHROMAX) 250 MG Tab Take 2 tablets (500 mg) PO on day  "1 and then take 1 tablet (250 mg) daily on 2-5 6 Tablet 0    methylPREDNISolone (MEDROL DOSEPAK) 4 MG Tablet Therapy Pack Take 1 Tablet by mouth every day. Follow schedule on package instructions. 21 Tablet 0    albuterol (PROVENTIL) 2.5mg/3ml Nebu Soln solution for nebulization INHALE 1 VIAL (3ML) VIA NABULIZATION EVERY 6 HOURS AS NEEDED FOR SHORTNESS OF BREATH 360 mL 0    fluticasone-salmeterol (ADVAIR DISKUS) 500-50 MCG/ACT AEROSOL POWDER, BREATH ACTIVATED Inhale 1 Puff 2 times a day. Rinse mouth after each use. 1 Each 11    albuterol (VENTOLIN HFA) 108 (90 Base) MCG/ACT Aero Soln inhalation aerosol Inhale 1 Puff every 6 hours as needed for Shortness of Breath. 2 Each 3    ibuprofen (MOTRIN) 800 MG Tab       ibuprofen (MOTRIN) 800 MG Tab Take 800 mg by mouth.      sildenafil citrate (VIAGRA) 100 MG tablet Take 0.5 Tabs by mouth 1 time daily as needed for Erectile Dysfunction. 30 Tab 2    benzonatate (TESSALON) 200 MG capsule Take 1 Capsule by mouth 3 times a day as needed for Cough. (Patient not taking: Reported on 1/29/2025) 30 Capsule 0    tiotropium (SPIRIVA RESPIMAT) 2.5 mcg/Act Aero Soln Inhale 2 Inhalations every day. (Patient not taking: Reported on 1/29/2025) 1 Each 11     No current facility-administered medications on file prior to visit.       Allergies: Penicillins and Succinylcholine      ROS:   Review of Systems   Respiratory:  Positive for cough, shortness of breath and wheezing.    All other systems reviewed and are negative.      Vitals:  /68 (BP Location: Right arm, Patient Position: Sitting, BP Cuff Size: Adult)   Pulse 72   Ht 1.702 m (5' 7\")   Wt 90.7 kg (200 lb)   SpO2 96%     Physical Exam:  Physical Exam  Vitals and nursing note reviewed.   Constitutional:       General: He is not in acute distress.     Appearance: He is well-developed. He is not diaphoretic.      Comments: Very pleasant   HENT:      Nose: Nose normal.      Mouth/Throat:      Pharynx: No oropharyngeal exudate. "   Eyes:      General: No scleral icterus.        Right eye: No discharge.         Left eye: No discharge.      Conjunctiva/sclera: Conjunctivae normal.      Pupils: Pupils are equal, round, and reactive to light.   Neck:      Thyroid: No thyromegaly.      Vascular: No JVD.      Trachea: No tracheal deviation.   Cardiovascular:      Rate and Rhythm: Normal rate and regular rhythm.      Heart sounds: Normal heart sounds. No murmur heard.  Pulmonary:      Effort: Pulmonary effort is normal. No respiratory distress.      Breath sounds: No stridor. Wheezing (right lower lobe) present. No rales.   Abdominal:      General: There is no distension.      Palpations: Abdomen is soft.      Tenderness: There is no abdominal tenderness. There is no guarding.   Musculoskeletal:         General: No tenderness. Normal range of motion.      Cervical back: Neck supple.   Lymphadenopathy:      Cervical: No cervical adenopathy.   Skin:     General: Skin is warm and dry.      Capillary Refill: Capillary refill takes less than 2 seconds.      Coloration: Skin is not pale.      Findings: No erythema.   Neurological:      Mental Status: He is alert and oriented to person, place, and time.      Sensory: No sensory deficit.      Motor: No abnormal muscle tone.      Coordination: Coordination normal.      Deep Tendon Reflexes: Reflexes normal.   Psychiatric:         Behavior: Behavior normal.         Thought Content: Thought content normal.         Judgment: Judgment normal.       Laboratory Data:    PFTs as reviewed by me personally show:  See HPI    Imaging as reviewed by me personally show:    See HPI    Assessment/Plan:    1. Eosinophilic asthma  IGE TOTAL [facility] IMMUNOCAP    IGE+ALLERGENS ZONE 15(26)    Miscellaneous Test    Coccidioides Antibodies Panel, Serum    CBC WITH DIFFERENTIAL    PULMONARY FUNCTION TESTS -Test requested: Complete Pulmonary Function Test      2. Abnormal CT scan, esophagus        3. BMI 31.0-31.9,adult           Lifelong asthmatic.  Absolute eosinophil count 430 in 2021.  No IgE on file.  Frequent exacerbations, 7+ in the last year.  Progressive obstruction over serial PFTs.  CT chest does show some trace reticulonodular opacities.  PFTs. Repeat CBC, send IgE, ABPA labs.  [place] serologies as he has a history of living in [place] and spends a great deal of time digging around in the dirt for gems/gold etc. Provided patient information about [facility], plan rx next visit depending on lab results.  CT chest 5/2024: incidentally reports distal esophageal circumferential wall thickening. + GERD. F/u PCP to determine if EGD indicated.    Return in about 4 weeks (around 2/26/2025) for [place] or [place].     This note was generated using voice recognition software which has a chance of producing errors of grammar and possibly content.  I have made every reasonable attempt to find and correct any obvious errors, but it should be expected that some may not be found prior to finalization of this note.    Time spent in record review prior to patient arrival, reviewing results, and in face-to-face encounter totaled 43 min, excluding any procedures if performed.  __________  Mckinley Beverly MD  Pulmonary and Critical Care Medicine  [facility]

## 2025-02-03 LAB
C IMMITIS AB SER QL ID: NOT DETECTED
COCCIDIOIDES AB TITR SER CF: NORMAL {TITER}
COCCIDIOIDES IGG SER-ACNC: 0.5 IV
COCCIDIOIDES IGM SERPL-ACNC: 0.2 IV

## 2025-02-05 ENCOUNTER — NON-PROVIDER VISIT (OUTPATIENT)
Dept: SLEEP MEDICINE | Facility: MEDICAL CENTER | Age: 65
End: 2025-02-05
Attending: INTERNAL MEDICINE
Payer: COMMERCIAL

## 2025-02-05 VITALS — BODY MASS INDEX: 30.76 KG/M2 | HEIGHT: 67 IN | WEIGHT: 196 LBS

## 2025-02-05 DIAGNOSIS — J82.83 EOSINOPHILIC ASTHMA: ICD-10-CM

## 2025-02-05 PROCEDURE — 94726 PLETHYSMOGRAPHY LUNG VOLUMES: CPT | Performed by: INTERNAL MEDICINE

## 2025-02-05 PROCEDURE — 94729 DIFFUSING CAPACITY: CPT | Performed by: INTERNAL MEDICINE

## 2025-02-05 PROCEDURE — 94729 DIFFUSING CAPACITY: CPT | Mod: 26 | Performed by: INTERNAL MEDICINE

## 2025-02-05 PROCEDURE — 94726 PLETHYSMOGRAPHY LUNG VOLUMES: CPT | Mod: 26 | Performed by: INTERNAL MEDICINE

## 2025-02-05 PROCEDURE — 94060 EVALUATION OF WHEEZING: CPT | Performed by: INTERNAL MEDICINE

## 2025-02-05 PROCEDURE — 94060 EVALUATION OF WHEEZING: CPT | Mod: 26 | Performed by: INTERNAL MEDICINE

## 2025-02-05 ASSESSMENT — FIBROSIS 4 INDEX: FIB4 SCORE: 0.91

## 2025-02-05 NOTE — PROCEDURES
Tech: Kimberly Santo, MARIAELENA  Good patient effort & cooperation.  Test was performed on the [employer] Body Plethysmograph- Elite DX system.  The predicted sets used for Spirometry are GLI-2012, for Lung Volumes are ITS, and for DLCO is GLI 2017.  The results of this test meet the ATS standards for acceptability and repeatability.  The DLCO was uncorrected for Hb.  A bronchodilator of Ventolin HFA 2 puffs via spacer was administered.  DLCO was performed during dilation period.    Interpretation:  Baseline spirometry shows airflow obstruction with an FEV1/FVC ratio 61 and an FEV1 of 1.22 L or 39% predicted.  There is significant bronchodilator response with improvement in FEV1 to 1.58 L or 50% predicted.  Total lung capacity is high normal at 7.35 L or 114% predicted.  There is significant air trapping with a residual volume of 198% predicted.  Diffusion capacity is elevated at 124% predicted.  Pulmonary function testing shows airflow obstruction with associated air trapping in addition to bronchodilator response and elevated DLCO.  This combination of findings is significant for uncontrolled asthma.

## 2025-02-08 ENCOUNTER — OFFICE VISIT (OUTPATIENT)
Dept: URGENT CARE | Facility: PHYSICIAN GROUP | Age: 65
End: 2025-02-08
Payer: COMMERCIAL

## 2025-02-08 ENCOUNTER — HOSPITAL ENCOUNTER (OUTPATIENT)
Dept: RADIOLOGY | Facility: MEDICAL CENTER | Age: 65
End: 2025-02-08
Payer: COMMERCIAL

## 2025-02-08 VITALS
HEIGHT: 67 IN | WEIGHT: 198.9 LBS | DIASTOLIC BLOOD PRESSURE: 84 MMHG | OXYGEN SATURATION: 95 % | SYSTOLIC BLOOD PRESSURE: 138 MMHG | HEART RATE: 69 BPM | TEMPERATURE: 96.7 F | BODY MASS INDEX: 31.22 KG/M2 | RESPIRATION RATE: 16 BRPM

## 2025-02-08 DIAGNOSIS — R10.33 PERIUMBILICAL ABDOMINAL PAIN: ICD-10-CM

## 2025-02-08 DIAGNOSIS — K42.9 REDUCIBLE UMBILICAL HERNIA: ICD-10-CM

## 2025-02-08 DIAGNOSIS — R93.5 ABNORMAL CT OF THE ABDOMEN: ICD-10-CM

## 2025-02-08 PROCEDURE — 3075F SYST BP GE 130 - 139MM HG: CPT

## 2025-02-08 PROCEDURE — 3079F DIAST BP 80-89 MM HG: CPT

## 2025-02-08 PROCEDURE — 74176 CT ABD & PELVIS W/O CONTRAST: CPT

## 2025-02-08 PROCEDURE — 99214 OFFICE O/P EST MOD 30 MIN: CPT

## 2025-02-08 ASSESSMENT — ENCOUNTER SYMPTOMS
ABDOMINAL PAIN: 1
VOMITING: 0
CONSTIPATION: 0
FEVER: 0
DIARRHEA: 0
CHILLS: 0
PAIN: 1
BLOOD IN STOOL: 0
NAUSEA: 0
HEARTBURN: 0

## 2025-02-08 ASSESSMENT — FIBROSIS 4 INDEX: FIB4 SCORE: 0.91

## 2025-02-08 NOTE — PROGRESS NOTES
Subjective:   Prashant Hewitt is a 64 y.o. male who presents for Pain (Possible umbilical hernia, pain comes and go, x2 weeks. )      Patient presents with complaints of possible umbilical hernia.  States that he has a significant history of gastrointestinal stomach tumor with surgery approximately 8 years ago.  Since then he has had waxing waning possible periumbilical/umbilical hernias which are usually reproducible and treated with hernia belt.  Patient states symptoms started after heavy lifting, reach out to his PCP who said to use his belt.  Patient has been using this but states that he is still having pain in that hernia is still present.  He denies any fever chills body aches, no constipation, diarrhea no referring abdominal pain.    Pain  Associated symptoms include abdominal pain. Pertinent negatives include no chills, fever, nausea or vomiting.       Review of Systems   Constitutional:  Negative for chills and fever.   Gastrointestinal:  Positive for abdominal pain. Negative for blood in stool, constipation, diarrhea, heartburn, melena, nausea and vomiting.   Genitourinary:  Negative for dysuria.   All other systems reviewed and are negative.    Refer to [facility] for additional details.    During this visit, appropriate PPE was worn, and hand hygiene was performed.    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    He has no past medical history of Wheezing.    MEDS:   Current Outpatient Medications:     methylPREDNISolone (MEDROL DOSEPAK) 4 MG Tablet Therapy Pack, Take 1 Tablet by mouth every day. Follow schedule on package instructions., Disp: 21 Tablet, Rfl: 0    albuterol (PROVENTIL) 2.5mg/3ml Nebu Soln solution for nebulization, INHALE 1 VIAL (3ML) VIA NABULIZATION EVERY 6 HOURS AS  "NEEDED FOR SHORTNESS OF BREATH, Disp: 360 mL, Rfl: 0    fluticasone-salmeterol (ADVAIR DISKUS) 500-50 MCG/ACT AEROSOL POWDER, BREATH ACTIVATED, Inhale 1 Puff 2 times a day. Rinse mouth after each use., Disp: 1 Each, Rfl: 11    albuterol (VENTOLIN HFA) 108 (90 Base) MCG/ACT Aero Soln inhalation aerosol, Inhale 1 Puff every 6 hours as needed for Shortness of Breath., Disp: 2 Each, Rfl: 3    predniSONE (DELTASONE) 10 MG Tab, 4 tabs po daily x 3 days, then 3 tabs po daily x 3 days, then 2 tabs po daily x 3 days, then 1 tab po daily x 3 days, 0.5 tabs po daily x 3 days., Disp: 32 Tablet, Rfl: 0    benzonatate (TESSALON) 200 MG capsule, Take 1 Capsule by mouth 3 times a day as needed for Cough. (Patient not taking: Reported on 1/29/2025), Disp: 30 Capsule, Rfl: 0    azithromycin (ZITHROMAX) 250 MG Tab, Take 2 tablets (500 mg) PO on day 1 and then take 1 tablet (250 mg) daily on 2-5 (Patient not taking: Reported on 2/8/2025), Disp: 6 Tablet, Rfl: 0    tiotropium (SPIRIVA RESPIMAT) 2.5 mcg/Act Aero Soln, Inhale 2 Inhalations every day. (Patient not taking: Reported on 1/29/2025), Disp: 1 Each, Rfl: 11    ibuprofen (MOTRIN) 800 MG Tab, , Disp: , Rfl:     ibuprofen (MOTRIN) 800 MG Tab, Take 800 mg by mouth., Disp: , Rfl:     sildenafil citrate (VIAGRA) 100 MG tablet, Take 0.5 Tabs by mouth 1 time daily as needed for Erectile Dysfunction., Disp: 30 Tab, Rfl: 2    ALLERGIES:   Allergies   Allergen Reactions    Penicillins Rash     Rash as [age] year old    Succinylcholine Unspecified     \"weakness and ill\", stopped breathing     SURGHX:   Past Surgical History:   Procedure Laterality Date    IL UPPER GI ENDOSCOPY,BIOPSY  7/16/2019    Procedure: GASTROSCOPY, WITH BIOPSY;  Surgeon: Zhou Miller M.D.;  Location: SURGERY [facility];  Service: EUS    GASTROSCOPY  7/16/2019    Procedure: GASTROSCOPY;  Surgeon: Zhou Miller M.D.;  Location: SURGERY [facility];  Service: EUS    EGD W/ENDOSCOPIC ULTRASOUND  7/16/2019    " "Procedure: EGD, WITH ENDOSCOPIC US - UPPER CURVILINEAR;  Surgeon: Zhou Miller M.D.;  Location: SURGERY [facility];  Service: EUS    EGD WITH ASP/BX  7/16/2019    Procedure: EGD, WITH ASPIRATION BIOPSY;  Surgeon: Zhou Miller M.D.;  Location: SURGERY [facility];  Service: EUS    CHOLECYSTECTOMY      FINGER AMPUTATION Right     right index accident when 3 yo    LAPAROTOMY      OTHER      GIST removal 3/2017    SEPTOPLASTY      SINUSCOPE       SOCHX:  reports that he has never smoked. He has been exposed to tobacco smoke. He has never used smokeless tobacco. He reports current alcohol use. He reports that he does not use drugs.    FH: Per HPI as applicable/pertinent.    Medications, Allergies, and current problem list reviewed today in Epic.     Objective:     /84 (BP Location: Right arm, Patient Position: Sitting, BP Cuff Size: Adult)   Pulse 69   Temp 35.9 °C (96.7 °F)   Resp 16   Ht 1.702 m (5' 7\")   Wt 90.2 kg (198 lb 14.4 oz)   SpO2 95%     Physical Exam  Vitals reviewed.   Constitutional:       General: He is not in acute distress.     Appearance: Normal appearance. He is obese. He is not ill-appearing, toxic-appearing or diaphoretic.   Abdominal:      General: There is distension.      Palpations: There is no mass.      Tenderness: There is abdominal tenderness. There is no right CVA tenderness, left CVA tenderness, guarding or rebound.      Hernia: A hernia is present. Hernia is present in the umbilical area.          Comments: Reproducible umbilical hernia in the above marked area.  Nonincarcerated, no signs of strangulation or infection   Neurological:      Mental Status: He is alert.         CT-ABDOMEN/PELVIS:  IMPRESSION:     1.  Multiple new peritoneal nodules and masses are in keeping with peritoneal/mesenteric metastatic disease/carcinomatosis.  2.  Hepatic steatosis.  3.  Colonic diverticulosis without acute diverticulitis.  4.  Tiny fat-containing umbilical hernia. No " bowel containing hernia.      Assessment/Plan:     Diagnosis and associated orders:     1. Periumbilical abdominal pain  - CT-ABDOMEN-PELVIS W/O; Future  - Referral to Gastroenterology    2. Reducible umbilical hernia  - CT-ABDOMEN-PELVIS W/O; Future    3. Abnormal CT of the abdomen  - Referral to Gastroenterology     Comments/MDM:     Patient history and physical exam consistent with acute periumbilical abdominal pain with concomitant reducible umbilical hernia.  Patient presents well in clinic with no red flag symptoms endorse were found on physical exam  Reviewed physical exam findings and HPI with patient, I did recommend that we do CT imaging to rule out any worsening condition as well as accurately measure depth and severity of hernia.  Patient is agreeable to this  CT abdomen pelvis did show a small fat-containing umbilical hernia with no bowel.  Imaging also revealed possible new peritoneal nodules and masses.  Did discuss imaging with patient, does have appointment with his previous oncologist Dr. Pugh.  Encourage patient to keep this appointment as well as review imaging.  Did suggest following up with his previous GI surgeon, patient is asking for referral for potential second opinion and evaluation.  Will put in referral to GI  Outpatient management will consist of continue wearing previously prescribed hernia belt, Tylenol for pain, proper biomechanics, avoid heavy lifting, follow-up with specialist         Differential diagnosis, natural history, supportive care, and indications for immediate follow-up discussed.    Advised the patient to follow-up with the primary care physician for recheck, reevaluation, and consideration of further management.    Please note that this dictation was created using voice recognition software. I have made a reasonable attempt to correct obvious errors, but I expect that there are errors of grammar and possibly content that I did not discover before finalizing the  note.    This note was electronically signed by TERESA Sherman

## 2025-02-13 NOTE — Clinical Note
REFERRAL APPROVAL NOTICE         Sent on February 12, 2025                   Prashant Hewitt  [address] [address]                   Dear Mr. Hewitt,    After a careful review of the medical information and benefit coverage, [facility] has processed your referral. See below for additional details.    If applicable, you must be actively enrolled with your insurance for coverage of the authorized service. If you have any questions regarding your coverage, please contact your insurance directly.    REFERRAL INFORMATION   Referral #:  [id number]  Referred-To Service Location    Referred-By Provider:  Gastroenterology    TERESA Sherman   [facility]      [address] [address]  [phone] [address] [facility] [address]  [phone]    Referral Start Date:  02/09/2025  Referral End Date:   02/09/2026             SCHEDULING  If you do not already have an appointment, please call [phone] to make an appointment.     MORE INFORMATION  If you do not already have a [employer] account, sign up at: [email or web address]  You can access your medical information, make appointments, see lab results, billing information, and more.  If you have questions regarding this referral, please contact  the [facility] Referrals department at:             [phone]. Monday - Friday 8:00AM - 5:00PM.     Sincerely,    [facility]

## 2025-02-20 ENCOUNTER — HOSPITAL ENCOUNTER (OUTPATIENT)
Dept: RADIOLOGY | Facility: MEDICAL CENTER | Age: 65
End: 2025-02-20
Payer: COMMERCIAL

## 2025-02-21 ENCOUNTER — APPOINTMENT (OUTPATIENT)
Dept: ADMISSIONS | Facility: MEDICAL CENTER | Age: 65
End: 2025-02-21
Attending: INTERNAL MEDICINE
Payer: COMMERCIAL

## 2025-02-21 NOTE — PROGRESS NOTES
Subjective:   2/25/2025  8:28 AM  Primary care physician: Baron Momin M.D.  Referring Provider: FERNY  Medical Oncologist: Dr Pugh    Chief Complaint:   Chief Complaint   Patient presents with    New Patient     GIST 2017  HX GLEEVEC  METASTATIC  PET 2/19  BX 2/28        Diagnosis:   1. Gastrointestinal stromal tumor of stomach (HCC)        2. Multiple nodules of lung        3. Fatty liver        4. Abnormal CT scan, esophagus        5. 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 male who was seen at Urgent care and then at [facility] for a new mass noted near a hernia. Patient has history of GIST approx 8 years ago, s/p partial gastrectomy high-grade GIST, treated with Gleevec (started in 2017, and was later stopped in 2022). Patient reports intermittent umbilical pain for numerous years, said to be related to umbilical hernia. EUS of jocelyne-gastric mass 20 mm near tail of pancreas, no pancreatic duct dilation already seen in 2019.    CT ABDOMEN/PELVIS W/O on 2/8/25 noted multiple new peritoneal nodules and masses are in keeping with peritoneal/mesenteric metastatic disease/carcinomatosis. Hepatic steatosis. Tiny fat-containing umbilical hernia. No bowel containing hernia.     CT PET on 2/19/25 noted multiple, at least 7, new hypermetabolic peritoneal metastases. New small area of mild pulmonary consolidation in an area of mucous plugging in the right middle lobe consistent with mild infectious/inflammatory process.    IR BX of retroperitoneal masses scheduled on 2/28/25    He is here today for evaluation what appears to be possible recurrent gastrointestinal stromal cell tumor from a resection 8 years ago.  The patient did have a perforation into the pancreas and underwent a radical resection 8 years ago at [facility].  He has been seen by medical  oncology and and was taking Gleevec for 5 years and stopped 3 years ago.  His annual surveillance have been stable until recently.  He went to the emergency room because of some pain and a hernia and he did a CT scan showed some peritoneal tumors.  This led to a PET scan ordered by his medical oncologist Dr. Pugh, I have personally reviewed the PET scan and it does show multiple lesions mostly on the left side of the abdomen.  All resectable if needed.  The patient is not on any therapy and has not been on any therapy for 3 years.  He also complains of an incisional hernia that he has been using it weight belt to help control its discomfort.  He denies any obstructive symptoms.  He denies any GI bleeds.  He denies any unusual weight loss or change in appetite.  The patient actually looks wonderful and I have personally reviewed the patient's last CT scan which was a CT without contrast.  He does not have a CT with contrast.    Past Medical History:   Diagnosis Date    Anesthesia     had rx to succinochline    Arthritis     Asthma without status asthmaticus 05/22/2018 2/24/25-exercise and cold temperature induced. Has rescue inhaler. Following with pulmonary    Bronchitis     chronic. Last 2024 x6.    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 Jin, Dr. Isabel (surgeon)     GERD (gastroesophageal reflux disease)     Heart burn     tums prn    Hiatus hernia syndrome     Kidney stones     approx 2022    Mass in the abdomen 02/08/2025    PERITONEAL MASS AND NODULE    Multiple nodules of lung 05/22/2018    Mumps     Pneumonia     2017    Prediabetes 11/22/2022    Shingles     approx 2017    Shortness of breath     related to asthma    Snoring   "    Past Surgical History:   Procedure Laterality Date    ND UPPER GI ENDOSCOPY,BIOPSY  07/16/2019    Procedure: GASTROSCOPY, WITH BIOPSY;  Surgeon: Zhou Miller M.D.;  Location: [facility];  Service: EUS    GASTROSCOPY  07/16/2019    Procedure: GASTROSCOPY;  Surgeon: Zhou Miller M.D.;  Location: [facility];  Service: EUS    EGD W/ENDOSCOPIC ULTRASOUND  07/16/2019    Procedure: EGD, WITH ENDOSCOPIC US - UPPER CURVILINEAR;  Surgeon: Zhou Miller M.D.;  Location: [facility];  Service: EUS    EGD WITH ASP/BX  07/16/2019    Procedure: EGD, WITH ASPIRATION BIOPSY;  Surgeon: Zhou Miller M.D.;  Location: [facility];  Service: EUS    COLONOSCOPY  07/16/2019    OTHER ABDOMINAL SURGERY  03/2017    GIST (gastrointesinal stomal tumor) removal with 1/2 stomach  removed    FINGER AMPUTATION Right 1965    right index accident when 3 yo    KIMO BY LAPAROSCOPY      ENDOSCOPY      SEPTOPLASTY       Allergies   Allergen Reactions    Succinylcholine Unspecified     \"weakness and ill\", stopped breathing    Penicillins Rash     Rash as [age] year old    Adrenalin [Epinephrine] Unspecified     Highly sensitive.   7/17/20- pt states he can have epinephrine at reduced dose.     Outpatient Encounter Medications as of 2/25/2025   Medication Sig Dispense Refill    albuterol (PROVENTIL) 2.5mg/3ml Nebu Soln solution for nebulization INHALE 1 VIAL (3ML) VIA NABULIZATION EVERY 6 HOURS AS NEEDED FOR SHORTNESS OF BREATH 360 mL 0    fluticasone-salmeterol (ADVAIR DISKUS) 500-50 MCG/ACT AEROSOL POWDER, BREATH ACTIVATED Inhale 1 Puff 2 times a day. Rinse mouth after each use. 1 Each 11    albuterol (VENTOLIN HFA) 108 (90 Base) MCG/ACT Aero Soln inhalation aerosol Inhale 1 Puff every 6 hours as needed for Shortness of Breath. 2 Each 3    sildenafil citrate (VIAGRA) 100 MG tablet Take 0.5 Tabs by mouth 1 time daily as needed for Erectile Dysfunction. 30 Tab 2    ibuprofen (MOTRIN) 200 " MG Tab Take 200-400 mg by mouth every 6 hours as needed. (Patient not taking: Reported on 2/25/2025)      acetaminophen (TYLENOL) 500 MG Tab Take 500-1,000 mg by mouth every 6 hours as needed. (Patient not taking: Reported on 2/25/2025)      predniSONE (DELTASONE) 10 MG Tab 4 tabs po daily x 3 days, then 3 tabs po daily x 3 days, then 2 tabs po daily x 3 days, then 1 tab po daily x 3 days, 0.5 tabs po daily x 3 days. (Patient not taking: Reported on 2/25/2025) 32 Tablet 0    [DISCONTINUED] benzonatate (TESSALON) 200 MG capsule Take 1 Capsule by mouth 3 times a day as needed for Cough. (Patient not taking: Reported on 1/29/2025) 30 Capsule 0    methylPREDNISolone (MEDROL DOSEPAK) 4 MG Tablet Therapy Pack Take 1 Tablet by mouth every day. Follow schedule on package instructions. (Patient not taking: Reported on 2/25/2025) 21 Tablet 0    [DISCONTINUED] azithromycin (ZITHROMAX) 250 MG Tab Take 2 tablets (500 mg) PO on day 1 and then take 1 tablet (250 mg) daily on 2-5 (Patient not taking: Reported on 2/8/2025) 6 Tablet 0    [DISCONTINUED] tiotropium (SPIRIVA RESPIMAT) 2.5 mcg/Act Aero Soln Inhale 2 Inhalations every day. (Patient not taking: Reported on 1/29/2025) 1 Each 11    [DISCONTINUED] ibuprofen (MOTRIN) 800 MG Tab       [DISCONTINUED] ibuprofen (MOTRIN) 800 MG Tab Take 800 mg by mouth.       No facility-administered encounter medications on file as of 2/25/2025.     Social History     Socioeconomic History    Marital status:      Spouse name: Not on file    Number of children: Not on file    Years of education: Not on file    Highest education level: Not on file   Occupational History    Not on file   Tobacco Use    Smoking status: Never     Passive exposure: Yes    Smokeless tobacco: Never   Vaping Use    Vaping status: Never Used   Substance and Sexual Activity    Alcohol use: Yes     Alcohol/week: 3.0 - 4.2 oz     Types: 5 - 7 Shots of liquor per week     Comment: Everyday    Drug use: Never    Sexual  "activity: Yes     Partners: Female   Other Topics Concern    Not on file   Social History Narrative    ; owns own business      Social Drivers of Health     Financial Resource Strain: Not on file   Food Insecurity: Not on file   Transportation Needs: Not on file   Physical Activity: Not on file   Stress: Not on file   Social Connections: Not on file   Intimate Partner Violence: Not on file   Housing Stability: Not on file      Social History     Tobacco Use   Smoking Status Never    Passive exposure: Yes   Smokeless Tobacco Never     Social History     Substance and Sexual Activity   Alcohol Use Yes    Alcohol/week: 3.0 - 4.2 oz    Types: 5 - 7 Shots of liquor per week    Comment: Everyday     Social History     Substance and Sexual Activity   Drug Use Never      Family History   Problem Relation Age of Onset    Hypertension Mother     Other Mother         glaucoma     Cancer Father         pancreatic     Heart Disease Father     Hypertension Brother        Review of Systems   All other systems reviewed and are negative.       Objective:   BP (!) 140/80 (BP Location: Right arm, Patient Position: Sitting, BP Cuff Size: Adult)   Pulse 65   Temp 36.7 °C (98 °F) (Temporal)   Ht 1.702 m (5' 7\")   Wt 86.8 kg (191 lb 7.5 oz)   SpO2 94%   BMI 29.99 kg/m²     Physical Exam  Vitals and nursing note reviewed.   Constitutional:       Appearance: Normal appearance.   HENT:      Head: Normocephalic.      Nose: Nose normal.      Mouth/Throat:      Mouth: Mucous membranes are moist.      Pharynx: Oropharynx is clear.   Eyes:      Extraocular Movements: Extraocular movements intact.      Conjunctiva/sclera: Conjunctivae normal.      Pupils: Pupils are equal, round, and reactive to light.   Cardiovascular:      Rate and Rhythm: Normal rate and regular rhythm.      Pulses: Normal pulses.      Heart sounds: Normal heart sounds.   Pulmonary:      Effort: Pulmonary effort is normal.      Breath sounds: " Normal breath sounds.   Abdominal:      General: Abdomen is flat. Bowel sounds are normal.      Palpations: Abdomen is soft.      Hernia: A hernia is present.      Comments: Small incisional hernia   Musculoskeletal:         General: Normal range of motion.      Cervical back: Normal range of motion.   Skin:     General: Skin is warm.   Neurological:      General: No focal deficit present.      Mental Status: He is alert and oriented to person, place, and time. Mental status is at baseline.   Psychiatric:         Mood and Affect: Mood normal.         Behavior: Behavior normal.         Thought Content: Thought content normal.         Judgment: Judgment normal.         Labs   Latest Reference Range & Units 01/29/25 15:16   WBC 4.8 - 10.8 K/uL 5.3   RBC 4.70 - 6.10 M/uL 5.03   Hemoglobin 14.0 - 18.0 g/dL 14.7   Hematocrit 42.0 - 52.0 % 43.8   MCV 81.4 - 97.8 fL 87.1   MCH 27.0 - 33.0 pg 29.2   MCHC 32.3 - 36.5 g/dL 33.6   RDW 35.9 - 50.0 fL 40.5   Platelet Count 164 - 446 K/uL 245   MPV 9.0 - 12.9 fL 10.6     Imaging  CT PET (2/19/25)      CT ABDOMEN/PELVIS W/O (2/8/25)  IMPRESSION:   1. Multiple new peritoneal nodules and masses are in keeping with peritoneal/mesenteric metastatic disease/carcinomatosis.   2. Hepatic steatosis.   3. Colonic diverticulosis without acute diverticulitis.   4. Tiny fat-containing umbilical hernia. No bowel containing hernia.     Pathology  N/A    Procedures  N/A    Diagnosis:     1. Gastrointestinal stromal tumor of stomach (HCC)        2. Multiple nodules of lung        3. Fatty liver        4. Abnormal CT scan, esophagus        5. 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  This was done 8 years ago.   The patient did 5 years of Gleevec and now has a recurrence.  He stopped Gleevec about 3 years ago.  His last surveillance imaging was stable.    My recommendation at this time is most likely to try to downstage this with Gleevec or second-generation drugs once it is confirmed by biopsy which she has scheduled this week.  He does have an appointment with his medical oncologist next week.  My recommendation is to get a pancreas protocol CT scan with a pelvic CT in order to assess location and adjacent structures.    This would be resectable at this time, it would be a cytoreductive surgery if we went to surgery first with a goal of curative intent.  In the meantime, I will discuss with his medical oncologist about upfront treatment followed by possible surgery.  I will see the patient after he undergoes his CT scan.    I, Dr. Huizar have entered, reviewed and confirmed the above diagnoses related to this patient on this date of service, 2/25/2025  8:28 AM.    He agreed and verbalized his agreement and understanding with the current plan. I answered all questions and concerns he has at this time and advised him to call at any time in the interim with questions or concerns in regards to his care.    Thank you for allowing me to participate in his care, I will continue to follow closely.       Please note that this dictation was created using voice recognition software. I have made every reasonable attempt to correct obvious errors, but I expect that there are errors of grammar and possibly content that I did not discover before finalizing the note.     Thank you for this consultation and allowing me to participate in your patient's care. If I can be of further service please contact my office.      I, Dr Huizar, have entered, reviewed and confirmed the above diagnoses related to this patient on this date of service, as per the time and date noted at top of this note.

## 2025-02-24 ENCOUNTER — PRE-ADMISSION TESTING (OUTPATIENT)
Dept: ADMISSIONS | Facility: MEDICAL CENTER | Age: 65
End: 2025-02-24
Attending: INTERNAL MEDICINE
Payer: COMMERCIAL

## 2025-02-24 ENCOUNTER — HOSPITAL ENCOUNTER (OUTPATIENT)
Dept: LAB | Facility: MEDICAL CENTER | Age: 65
End: 2025-02-24
Attending: NURSE PRACTITIONER
Payer: COMMERCIAL

## 2025-02-24 VITALS — HEIGHT: 67 IN | BODY MASS INDEX: 31.62 KG/M2

## 2025-02-24 DIAGNOSIS — Z01.812 PRE-OPERATIVE LABORATORY EXAMINATION: ICD-10-CM

## 2025-02-24 LAB
ERYTHROCYTE [DISTWIDTH] IN BLOOD BY AUTOMATED COUNT: 41.1 FL (ref 35.9–50)
HCT VFR BLD AUTO: 46.4 % (ref 42–52)
HGB BLD-MCNC: 15.6 G/DL (ref 14–18)
INR PPP: 1.1 (ref 0.87–1.13)
MCH RBC QN AUTO: 29.4 PG (ref 27–33)
MCHC RBC AUTO-ENTMCNC: 33.6 G/DL (ref 32.3–36.5)
MCV RBC AUTO: 87.5 FL (ref 81.4–97.8)
PLATELET # BLD AUTO: 242 K/UL (ref 164–446)
PMV BLD AUTO: 10.6 FL (ref 9–12.9)
PROTHROMBIN TIME: 14.2 SEC (ref 12–14.6)
RBC # BLD AUTO: 5.3 M/UL (ref 4.7–6.1)
WBC # BLD AUTO: 6.3 K/UL (ref 4.8–10.8)

## 2025-02-24 PROCEDURE — 85027 COMPLETE CBC AUTOMATED: CPT

## 2025-02-24 PROCEDURE — 36415 COLL VENOUS BLD VENIPUNCTURE: CPT

## 2025-02-24 PROCEDURE — 85610 PROTHROMBIN TIME: CPT

## 2025-02-24 RX ORDER — IBUPROFEN 200 MG
200-400 TABLET ORAL EVERY 6 HOURS PRN
COMMUNITY
End: 2025-03-18

## 2025-02-24 RX ORDER — ACETAMINOPHEN 500 MG
500-1000 TABLET ORAL EVERY 6 HOURS PRN
COMMUNITY
End: 2025-03-18

## 2025-02-24 NOTE — PREPROCEDURE INSTRUCTIONS
Pre-admit appointment completed.  Pt instructed to follow radiology instructions regarding meds and NPO status.     Confirmed check in location for DOS. Emailed pt pre admit email with map.

## 2025-02-25 ENCOUNTER — OFFICE VISIT (OUTPATIENT)
Facility: MEDICAL CENTER | Age: 65
End: 2025-02-25
Payer: COMMERCIAL

## 2025-02-25 ENCOUNTER — HOSPITAL ENCOUNTER (OUTPATIENT)
Dept: LAB | Facility: MEDICAL CENTER | Age: 65
End: 2025-02-25
Attending: SURGERY
Payer: COMMERCIAL

## 2025-02-25 VITALS
WEIGHT: 191.47 LBS | OXYGEN SATURATION: 94 % | DIASTOLIC BLOOD PRESSURE: 80 MMHG | HEART RATE: 65 BPM | BODY MASS INDEX: 30.05 KG/M2 | TEMPERATURE: 98 F | SYSTOLIC BLOOD PRESSURE: 140 MMHG | HEIGHT: 67 IN

## 2025-02-25 DIAGNOSIS — R33.9 URINARY RETENTION: ICD-10-CM

## 2025-02-25 DIAGNOSIS — R91.8 MULTIPLE NODULES OF LUNG: ICD-10-CM

## 2025-02-25 DIAGNOSIS — K76.0 FATTY LIVER: ICD-10-CM

## 2025-02-25 DIAGNOSIS — R93.3 ABNORMAL CT SCAN, ESOPHAGUS: ICD-10-CM

## 2025-02-25 DIAGNOSIS — C49.A2 GASTROINTESTINAL STROMAL TUMOR OF STOMACH (HCC): ICD-10-CM

## 2025-02-25 DIAGNOSIS — C49.A9 MALIGNANT GASTROINTESTINAL STROMAL TUMOR (GIST) OF OTHER SITE (HCC): ICD-10-CM

## 2025-02-25 PROBLEM — C49.9: Status: ACTIVE | Noted: 2025-02-25

## 2025-02-25 PROCEDURE — 84153 ASSAY OF PSA TOTAL: CPT

## 2025-02-25 PROCEDURE — 84154 ASSAY OF PSA FREE: CPT

## 2025-02-25 PROCEDURE — 36415 COLL VENOUS BLD VENIPUNCTURE: CPT

## 2025-02-25 ASSESSMENT — FIBROSIS 4 INDEX: FIB4 SCORE: 0.92

## 2025-02-26 ENCOUNTER — HOSPITAL ENCOUNTER (OUTPATIENT)
Dept: RADIOLOGY | Facility: MEDICAL CENTER | Age: 65
End: 2025-02-26
Attending: SURGERY
Payer: COMMERCIAL

## 2025-02-26 DIAGNOSIS — R93.3 ABNORMAL CT SCAN, ESOPHAGUS: ICD-10-CM

## 2025-02-26 DIAGNOSIS — K76.0 FATTY LIVER: ICD-10-CM

## 2025-02-26 DIAGNOSIS — C49.A9 MALIGNANT GASTROINTESTINAL STROMAL TUMOR (GIST) OF OTHER SITE (HCC): ICD-10-CM

## 2025-02-26 DIAGNOSIS — R33.9 URINARY RETENTION: ICD-10-CM

## 2025-02-26 DIAGNOSIS — C49.A2 GASTROINTESTINAL STROMAL TUMOR OF STOMACH (HCC): ICD-10-CM

## 2025-02-26 DIAGNOSIS — R91.8 MULTIPLE NODULES OF LUNG: ICD-10-CM

## 2025-02-27 ENCOUNTER — PATIENT OUTREACH (OUTPATIENT)
Dept: ONCOLOGY | Facility: MEDICAL CENTER | Age: 65
End: 2025-02-27
Payer: COMMERCIAL

## 2025-02-27 ENCOUNTER — TELEPHONE (OUTPATIENT)
Facility: MEDICAL CENTER | Age: 65
End: 2025-02-27
Payer: COMMERCIAL

## 2025-02-27 ENCOUNTER — HOSPITAL ENCOUNTER (OUTPATIENT)
Dept: LAB | Facility: MEDICAL CENTER | Age: 65
End: 2025-02-27
Attending: NURSE PRACTITIONER
Payer: COMMERCIAL

## 2025-02-27 DIAGNOSIS — C49.A2 GASTROINTESTINAL STROMAL TUMOR OF STOMACH (HCC): ICD-10-CM

## 2025-02-27 LAB
ALBUMIN SERPL BCP-MCNC: 4.2 G/DL (ref 3.2–4.9)
ALBUMIN/GLOB SERPL: 1.5 G/DL
ALP SERPL-CCNC: 83 U/L (ref 30–99)
ALT SERPL-CCNC: 31 U/L (ref 2–50)
ANION GAP SERPL CALC-SCNC: 10 MMOL/L (ref 7–16)
AST SERPL-CCNC: 24 U/L (ref 12–45)
BILIRUB SERPL-MCNC: 0.7 MG/DL (ref 0.1–1.5)
BUN SERPL-MCNC: 13 MG/DL (ref 8–22)
CALCIUM ALBUM COR SERPL-MCNC: 9.2 MG/DL (ref 8.5–10.5)
CALCIUM SERPL-MCNC: 9.4 MG/DL (ref 8.5–10.5)
CHLORIDE SERPL-SCNC: 105 MMOL/L (ref 96–112)
CO2 SERPL-SCNC: 24 MMOL/L (ref 20–33)
CREAT SERPL-MCNC: 0.94 MG/DL (ref 0.5–1.4)
GFR SERPLBLD CREATININE-BSD FMLA CKD-EPI: 90 ML/MIN/1.73 M 2
GLOBULIN SER CALC-MCNC: 2.8 G/DL (ref 1.9–3.5)
GLUCOSE SERPL-MCNC: 137 MG/DL (ref 65–99)
POTASSIUM SERPL-SCNC: 3.9 MMOL/L (ref 3.6–5.5)
PROT SERPL-MCNC: 7 G/DL (ref 6–8.2)
PSA FREE MFR SERPL: 22 %
PSA FREE SERPL-MCNC: 0.2 NG/ML
PSA SERPL-MCNC: 0.9 NG/ML (ref 0–4)
SODIUM SERPL-SCNC: 139 MMOL/L (ref 135–145)

## 2025-02-27 PROCEDURE — 80053 COMPREHEN METABOLIC PANEL: CPT

## 2025-02-27 PROCEDURE — 36415 COLL VENOUS BLD VENIPUNCTURE: CPT

## 2025-02-27 NOTE — TELEPHONE ENCOUNTER
Ruddy called this morning stating that he needs a lab order for a GFR so he can have his CT scan done.

## 2025-02-28 ENCOUNTER — HOSPITAL ENCOUNTER (OUTPATIENT)
Facility: MEDICAL CENTER | Age: 65
End: 2025-02-28
Attending: INTERNAL MEDICINE | Admitting: INTERNAL MEDICINE
Payer: COMMERCIAL

## 2025-02-28 ENCOUNTER — APPOINTMENT (OUTPATIENT)
Dept: RADIOLOGY | Facility: MEDICAL CENTER | Age: 65
End: 2025-02-28
Attending: INTERNAL MEDICINE
Payer: COMMERCIAL

## 2025-02-28 VITALS
WEIGHT: 188.71 LBS | HEART RATE: 73 BPM | OXYGEN SATURATION: 92 % | SYSTOLIC BLOOD PRESSURE: 137 MMHG | DIASTOLIC BLOOD PRESSURE: 71 MMHG | TEMPERATURE: 97.3 F | BODY MASS INDEX: 29.56 KG/M2 | RESPIRATION RATE: 18 BRPM

## 2025-02-28 DIAGNOSIS — C49.A2 GASTROINTESTINAL STROMAL TUMOR OF STOMACH (HCC): ICD-10-CM

## 2025-02-28 DIAGNOSIS — R63.4 ABNORMAL WEIGHT LOSS: ICD-10-CM

## 2025-02-28 DIAGNOSIS — C49.A9 GASTROINTESTINAL STROMAL TUMOR OF OTHER SITES (HCC): ICD-10-CM

## 2025-02-28 PROCEDURE — 88363 XM ARCHIVE TISSUE MOLEC ANAL: CPT | Performed by: PATHOLOGY

## 2025-02-28 PROCEDURE — 160015 HCHG STAT PREOP MINUTES

## 2025-02-28 PROCEDURE — 4401636 CT-NEEDLE CORE BX-ABD-RETROPERITONIAL

## 2025-02-28 PROCEDURE — 88342 IMHCHEM/IMCYTCHM 1ST ANTB: CPT | Performed by: PATHOLOGY

## 2025-02-28 PROCEDURE — 88341 IMHCHEM/IMCYTCHM EA ADD ANTB: CPT | Mod: 26,59 | Performed by: PATHOLOGY

## 2025-02-28 PROCEDURE — 88342 IMHCHEM/IMCYTCHM 1ST ANTB: CPT | Mod: 26,59 | Performed by: PATHOLOGY

## 2025-02-28 PROCEDURE — 700111 HCHG RX REV CODE 636 W/ 250 OVERRIDE (IP): Mod: JZ

## 2025-02-28 PROCEDURE — 88360 TUMOR IMMUNOHISTOCHEM/MANUAL: CPT | Performed by: PATHOLOGY

## 2025-02-28 PROCEDURE — 88360 TUMOR IMMUNOHISTOCHEM/MANUAL: CPT | Mod: 26 | Performed by: PATHOLOGY

## 2025-02-28 PROCEDURE — 160025 RECOVERY II MINUTES (STATS)

## 2025-02-28 PROCEDURE — 99152 MOD SED SAME PHYS/QHP 5/>YRS: CPT

## 2025-02-28 PROCEDURE — 88341 IMHCHEM/IMCYTCHM EA ADD ANTB: CPT | Mod: 91 | Performed by: PATHOLOGY

## 2025-02-28 PROCEDURE — 700111 HCHG RX REV CODE 636 W/ 250 OVERRIDE (IP): Mod: JZ | Performed by: RADIOLOGY

## 2025-02-28 PROCEDURE — 160002 HCHG RECOVERY MINUTES (STAT)

## 2025-02-28 PROCEDURE — 88305 TISSUE EXAM BY PATHOLOGIST: CPT | Mod: 26 | Performed by: PATHOLOGY

## 2025-02-28 PROCEDURE — 88305 TISSUE EXAM BY PATHOLOGIST: CPT | Performed by: PATHOLOGY

## 2025-02-28 RX ORDER — ONDANSETRON 2 MG/ML
4 INJECTION INTRAMUSCULAR; INTRAVENOUS PRN
Status: DISCONTINUED | OUTPATIENT
Start: 2025-02-28 | End: 2025-02-28 | Stop reason: HOSPADM

## 2025-02-28 RX ORDER — MIDAZOLAM HYDROCHLORIDE 1 MG/ML
.5-2 INJECTION INTRAMUSCULAR; INTRAVENOUS PRN
Status: DISCONTINUED | OUTPATIENT
Start: 2025-02-28 | End: 2025-02-28 | Stop reason: HOSPADM

## 2025-02-28 RX ORDER — MIDAZOLAM HYDROCHLORIDE 1 MG/ML
INJECTION INTRAMUSCULAR; INTRAVENOUS
Status: COMPLETED
Start: 2025-02-28 | End: 2025-02-28

## 2025-02-28 RX ORDER — SODIUM CHLORIDE 9 MG/ML
500 INJECTION, SOLUTION INTRAVENOUS
Status: DISCONTINUED | OUTPATIENT
Start: 2025-02-28 | End: 2025-02-28 | Stop reason: HOSPADM

## 2025-02-28 RX ADMIN — MIDAZOLAM HYDROCHLORIDE 1 MG: 1 INJECTION, SOLUTION INTRAMUSCULAR; INTRAVENOUS at 13:46

## 2025-02-28 RX ADMIN — MIDAZOLAM HYDROCHLORIDE 0.5 MG: 1 INJECTION, SOLUTION INTRAMUSCULAR; INTRAVENOUS at 13:49

## 2025-02-28 RX ADMIN — FENTANYL CITRATE 25 MCG: 50 INJECTION, SOLUTION INTRAMUSCULAR; INTRAVENOUS at 13:52

## 2025-02-28 RX ADMIN — FENTANYL CITRATE 25 MCG: 50 INJECTION, SOLUTION INTRAMUSCULAR; INTRAVENOUS at 13:59

## 2025-02-28 RX ADMIN — MIDAZOLAM HYDROCHLORIDE 0.5 MG: 1 INJECTION, SOLUTION INTRAMUSCULAR; INTRAVENOUS at 13:53

## 2025-02-28 RX ADMIN — FENTANYL CITRATE 50 MCG: 50 INJECTION, SOLUTION INTRAMUSCULAR; INTRAVENOUS at 13:46

## 2025-02-28 ASSESSMENT — PAIN DESCRIPTION - PAIN TYPE: TYPE: ACUTE PAIN

## 2025-02-28 ASSESSMENT — FIBROSIS 4 INDEX
FIB4 SCORE: 1.14
FIB4 SCORE: 1.14

## 2025-02-28 NOTE — PROGRESS NOTES
Pt arrived at 1408 from procedure room. RN received report from IR RN. Midline abdomen incision site CDI, covered with guaze and tegaderm. Pt tolerated PO fluids and food.   1422: Pt's wife, Soniya, at bedside.   1455: RN went over d/c instructions with pt and family. All questions/concerns answered. Pt ambulatory without assistance.   1500: Pt d/c. RN removed IV and ID band.

## 2025-02-28 NOTE — PROGRESS NOTES
Pt presents to CT. pt was consented by MD at bedside, confirmed by this RN and consent at bedside. Pt transferred to CT table in supine position. Patient underwent a peritoneal mass biopsy by Dr. martinez. Procedure site was marked by MD and verified using imaging guidance. Pt placed on monitor, prepped and draped in a sterile fashion. Vitals were taken every 5 minutes and remained stable during procedure (see doc flow sheet for results). CO2 waveform capnography was monitored and remained WNL throughout procedure.  RN bedside report given. Pt transported by stretcher with RN to [facility].     Specimen: 3 cores  in formalin delivered to lab.

## 2025-02-28 NOTE — OR SURGEON
Immediate Post- Operative Note        Findings: Core biopsy x 3 from peritoneal mass.      Procedure(s): Core biopsy with CT guidance      Estimated Blood Loss: Less than 5 ml        Complications: None            2/28/2025     2:03 PM     Klasu Mcknight M.D.

## 2025-02-28 NOTE — DISCHARGE INSTRUCTIONS
Care After Needle Biopsy     These instructions tell you how to care for yourself after your procedure.  Call your doctor if you have any problems or questions.     What can I expect after the procedure?   After a needle biopsy, it is common to have these things at the puncture site:    Soreness.    Bruising.    Mild pain.   These things should go away after a few days.     Follow these instructions at home:   Puncture site care    Wash your hands with soap and water for at least 20 seconds before and after you change your bandage (dressing). If you cannot use soap and water, use hand .    Leave the dressing on for 24 hours. Do not take baths, swim, or use a hot tub for 1 week until the site is healed. You may shower after the dressing is removed.    Check your puncture site every day for signs of infection. Check for:   Redness, swelling, or more pain.   Fluid or blood.   Warmth.   Pus or a bad smell.     General instructions    Do not lift anything over 10lbs for 24 hours.     Take over-the-counter and prescription medicines only as told by your doctor.    Do not take baths, swim, or use a hot tub for 1 week until the site is healed. You may shower after the dressing is removed.      When to notify your doctor:    You have a fever.    You have redness, swelling, or more pain at the puncture site, and it lasts longer than a few  days.    You have fluid, blood, or pus coming from the puncture site.    Your puncture site feels warm.     Get help right away if:    You have very bad bleeding from the puncture site.     Summary    After the procedure, it is common to have soreness, bruising, or mild pain at the puncture site.    Check your puncture site every day for signs of infection, such as redness, swelling, or more pain.    Get help right away if you have very bad bleeding from your puncture site.     This information is not intended to replace advice given to you by your health care provider. Make sure  you discuss any questions you have with your health care provider.       What to Expect Post Sedation    Rest and take it easy for the first 24 hours.  A responsible adult is recommended to remain with you during that time.  It is normal to feel sleepy.  We encourage you to not do anything that requires balance, judgment or coordination.    FOR 24 HOURS DO NOT:  Drive, operate machinery or run household appliances.  Drink beer or alcoholic beverages.  Make important decisions or sign legal documents.    To avoid nausea, slowly advance diet as tolerated, avoiding spicy or greasy foods for the first day.  Add more substantial food to your diet according to your provider's instructions.  INCREASE FLUIDS AND FIBER TO AVOID CONSTIPATION.    MILD FLU-LIKE SYMPTOMS ARE NORMAL.  YOU MAY EXPERIENCE GENERALIZED MUSCLE ACHES, THROAT IRRITATION, HEADACHE AND/OR SOME NAUSEA.  If any questions arise, call your provider.  If your provider is not available, please feel free to call the [facility] at [phone].    MEDICATIONS: Resume taking daily medication.  Take prescribed pain medication with food.  If no medication is prescribed, you may take non-aspirin pain medication if needed.  PAIN MEDICATION CAN BE VERY CONSTIPATING.  Take a stool softener or laxative such as senokot, pericolace, or milk of magnesia if needed.        Diet    Resume your normal diet as tolerated.  A diet low in cholesterol, fat, and sodium is recommended for good health.       BOWEL FUNCTION:  If you are having problems, use what you normally would or call your provider for suggestions. It also helps to stay regular by including fiber in your diet (for example: bran or fruits and vegetables) and drink plenty of liquids (water, juice, etc.).

## 2025-03-01 ENCOUNTER — HOSPITAL ENCOUNTER (OUTPATIENT)
Dept: RADIOLOGY | Facility: MEDICAL CENTER | Age: 65
End: 2025-03-01
Attending: SURGERY
Payer: COMMERCIAL

## 2025-03-01 PROCEDURE — 74177 CT ABD & PELVIS W/CONTRAST: CPT

## 2025-03-01 PROCEDURE — 700117 HCHG RX CONTRAST REV CODE 255: Performed by: SURGERY

## 2025-03-01 RX ADMIN — IOHEXOL 100 ML: 350 INJECTION, SOLUTION INTRAVENOUS at 10:15

## 2025-03-03 LAB — PATHOLOGY CONSULT NOTE: NORMAL

## 2025-03-18 ENCOUNTER — OFFICE VISIT (OUTPATIENT)
Dept: URGENT CARE | Facility: PHYSICIAN GROUP | Age: 65
End: 2025-03-18
Payer: COMMERCIAL

## 2025-03-18 VITALS
HEART RATE: 75 BPM | TEMPERATURE: 97.9 F | OXYGEN SATURATION: 94 % | WEIGHT: 191 LBS | SYSTOLIC BLOOD PRESSURE: 136 MMHG | BODY MASS INDEX: 29.98 KG/M2 | DIASTOLIC BLOOD PRESSURE: 78 MMHG | HEIGHT: 67 IN | RESPIRATION RATE: 19 BRPM

## 2025-03-18 DIAGNOSIS — J22 ACUTE RESPIRATORY INFECTION: ICD-10-CM

## 2025-03-18 DIAGNOSIS — D84.9 IMMUNOCOMPROMISED STATE (HCC): ICD-10-CM

## 2025-03-18 PROCEDURE — 3078F DIAST BP <80 MM HG: CPT | Performed by: NURSE PRACTITIONER

## 2025-03-18 PROCEDURE — 99213 OFFICE O/P EST LOW 20 MIN: CPT | Performed by: NURSE PRACTITIONER

## 2025-03-18 PROCEDURE — 3075F SYST BP GE 130 - 139MM HG: CPT | Performed by: NURSE PRACTITIONER

## 2025-03-18 RX ORDER — DOXYCYCLINE 100 MG/1
100 CAPSULE ORAL 2 TIMES DAILY
Qty: 14 CAPSULE | Refills: 1 | Status: SHIPPED | OUTPATIENT
Start: 2025-03-18 | End: 2025-03-25

## 2025-03-18 RX ORDER — ALBUTEROL SULFATE 90 UG/1
2 INHALANT RESPIRATORY (INHALATION) EVERY 6 HOURS PRN
Qty: 8.5 G | Refills: 2 | Status: SHIPPED | OUTPATIENT
Start: 2025-03-18

## 2025-03-18 RX ORDER — ALBUTEROL SULFATE 90 UG/1
1 INHALANT RESPIRATORY (INHALATION) EVERY 6 HOURS PRN
Qty: 2 EACH | Refills: 3 | Status: SHIPPED | OUTPATIENT
Start: 2025-03-18

## 2025-03-18 RX ORDER — METHYLPREDNISOLONE 4 MG/1
TABLET ORAL
Qty: 21 TABLET | Refills: 0 | Status: SHIPPED | OUTPATIENT
Start: 2025-03-18

## 2025-03-18 RX ORDER — SUNITINIB MALATE 37.5 MG/1
CAPSULE ORAL
COMMUNITY
Start: 2025-03-10

## 2025-03-18 RX ORDER — ONDANSETRON 4 MG/1
TABLET, ORALLY DISINTEGRATING ORAL
COMMUNITY
Start: 2025-03-07

## 2025-03-18 ASSESSMENT — ENCOUNTER SYMPTOMS
EYE DISCHARGE: 0
CHILLS: 0
SPUTUM PRODUCTION: 1
WHEEZING: 1
FEVER: 0
SHORTNESS OF BREATH: 0
MYALGIAS: 0
ORTHOPNEA: 0
NAUSEA: 0
DIARRHEA: 0
HEADACHES: 1
COUGH: 1
SORE THROAT: 0

## 2025-03-18 ASSESSMENT — FIBROSIS 4 INDEX: FIB4 SCORE: 1.14

## 2025-03-18 NOTE — PROGRESS NOTES
Subjective     Ruddy Hewitt is a 64 y.o. male who presents with Cough (Sinus pressure, runny nose, green phlegm, x 3 days)            HPI  New problem.  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  He also endorses wheezing.  He denies fever, chills, myalgia, nausea, or diarrhea.  He has not been taking any medications for the symptoms.  He does use albuterol occasionally.    Succinylcholine, Penicillins, and Adrenalin [epinephrine]  Current Outpatient Medications on File Prior to Visit   Medication Sig Dispense Refill    SUNItinib Malate 37.5 MG Cap       ondansetron (ZOFRAN ODT) 4 MG TABLET DISPERSIBLE       albuterol (PROVENTIL) 2.5mg/3ml Nebu Soln solution for nebulization INHALE 1 VIAL (3ML) VIA NABULIZATION EVERY 6 HOURS AS NEEDED FOR SHORTNESS OF BREATH 360 mL 0    fluticasone-salmeterol (ADVAIR DISKUS) 500-50 MCG/ACT AEROSOL POWDER, BREATH ACTIVATED Inhale 1 Puff 2 times a day. Rinse mouth after each use. 1 Each 11    albuterol (VENTOLIN HFA) 108 (90 Base) MCG/ACT Aero Soln inhalation aerosol Inhale 1 Puff every 6 hours as needed for Shortness of Breath. 2 Each 3    sildenafil citrate (VIAGRA) 100 MG tablet Take 0.5 Tabs by mouth 1 time daily as needed for Erectile Dysfunction. 30 Tab 2     No current facility-administered medications on file prior to visit.     Social History     Socioeconomic History    Marital status:      Spouse name: Not on file    Number of children: Not on file    Years of education: Not on file    Highest education level: Not on file   Occupational History    Not on file   Tobacco Use    Smoking status: Never     Passive exposure: Yes    Smokeless tobacco: Never   Vaping Use    Vaping status: Never Used   Substance and Sexual Activity    Alcohol use: Not Currently     Alcohol/week: 3.0 - 4.2 oz     Types: 5 - 7 Shots of liquor  "per week     Comment: Everyday    Drug use: Never    Sexual activity: Yes     Partners: Female   Other Topics Concern    Not on file   Social History Narrative    ; owns own business      Social Drivers of Health     Financial Resource Strain: Not on file   Food Insecurity: Not on file   Transportation Needs: Not on file   Physical Activity: Not on file   Stress: Not on file   Social Connections: Not on file   Intimate Partner Violence: Not on file   Housing Stability: Not on file     Breast Cancer-related family history is not on file.      Review of Systems   Constitutional:  Positive for malaise/fatigue. Negative for chills and fever.   HENT:  Positive for congestion. Negative for sore throat.    Eyes:  Negative for discharge.   Respiratory:  Positive for cough, sputum production and wheezing. Negative for shortness of breath.    Cardiovascular:  Negative for chest pain and orthopnea.   Gastrointestinal:  Negative for diarrhea and nausea.   Musculoskeletal:  Negative for myalgias.   Neurological:  Positive for headaches.   Endo/Heme/Allergies:  Negative for environmental allergies.   All other systems reviewed and are negative.             Objective     /78   Pulse 75   Temp 36.6 °C (97.9 °F)   Resp 19   Ht 1.69 m (5' 6.54\")   Wt 86.6 kg (191 lb)   SpO2 94%   BMI 30.33 kg/m²      Physical Exam  Constitutional:       General: He is not in acute distress.     Appearance: Normal appearance. He is well-developed.   HENT:      Head: Normocephalic.      Right Ear: Tympanic membrane and external ear normal.      Left Ear: Tympanic membrane and external ear normal.      Nose: Mucosal edema, congestion and rhinorrhea present.      Mouth/Throat:      Pharynx: No posterior oropharyngeal erythema.   Eyes:      General:         Right eye: No discharge.         Left eye: No discharge.      Conjunctiva/sclera: Conjunctivae normal.   Cardiovascular:      Rate and Rhythm: Normal rate and " regular rhythm.      Heart sounds: Normal heart sounds.   Pulmonary:      Effort: Pulmonary effort is normal.      Breath sounds: Wheezing present.   Musculoskeletal:         General: Normal range of motion.      Cervical back: Normal range of motion and neck supple.   Lymphadenopathy:      Cervical: No cervical adenopathy.   Skin:     General: Skin is warm and dry.   Neurological:      Mental Status: He is alert and oriented to person, place, and time.   Psychiatric:         Behavior: Behavior normal.         Thought Content: Thought content normal.                                  Assessment & Plan  Acute respiratory infection    Orders:    albuterol 108 (90 Base) MCG/ACT Aero Soln inhalation aerosol; Inhale 2 Puffs every 6 hours as needed for Shortness of Breath.    doxycycline (VIBRAMYCIN) 100 MG Cap; Take 1 Capsule by mouth 2 times a day for 7 days.    methylPREDNISolone (MEDROL DOSEPAK) 4 MG Tablet Therapy Pack; Follow schedule on package instructions.    Immunocompromised state (HCC)  Differential diagnosis, natural history, supportive care, and indications for immediate follow-up were discussed.

## 2025-03-27 ENCOUNTER — HOSPITAL ENCOUNTER (OUTPATIENT)
Dept: LAB | Facility: MEDICAL CENTER | Age: 65
End: 2025-03-27
Attending: INTERNAL MEDICINE
Payer: COMMERCIAL

## 2025-03-27 LAB
ALBUMIN SERPL BCP-MCNC: 4 G/DL (ref 3.2–4.9)
ALBUMIN/GLOB SERPL: 1.5 G/DL
ALP SERPL-CCNC: 100 U/L (ref 30–99)
ALT SERPL-CCNC: 43 U/L (ref 2–50)
ANION GAP SERPL CALC-SCNC: 12 MMOL/L (ref 7–16)
AST SERPL-CCNC: 25 U/L (ref 12–45)
BASOPHILS # BLD AUTO: 0.5 % (ref 0–1.8)
BASOPHILS # BLD: 0.03 K/UL (ref 0–0.12)
BILIRUB SERPL-MCNC: 1.2 MG/DL (ref 0.1–1.5)
BUN SERPL-MCNC: 12 MG/DL (ref 8–22)
CALCIUM ALBUM COR SERPL-MCNC: 9.2 MG/DL (ref 8.5–10.5)
CALCIUM SERPL-MCNC: 9.2 MG/DL (ref 8.5–10.5)
CHLORIDE SERPL-SCNC: 103 MMOL/L (ref 96–112)
CO2 SERPL-SCNC: 25 MMOL/L (ref 20–33)
CREAT SERPL-MCNC: 0.97 MG/DL (ref 0.5–1.4)
EOSINOPHIL # BLD AUTO: 0.1 K/UL (ref 0–0.51)
EOSINOPHIL NFR BLD: 1.6 % (ref 0–6.9)
ERYTHROCYTE [DISTWIDTH] IN BLOOD BY AUTOMATED COUNT: 40.9 FL (ref 35.9–50)
GFR SERPLBLD CREATININE-BSD FMLA CKD-EPI: 87 ML/MIN/1.73 M 2
GLOBULIN SER CALC-MCNC: 2.7 G/DL (ref 1.9–3.5)
GLUCOSE SERPL-MCNC: 106 MG/DL (ref 65–99)
HCT VFR BLD AUTO: 48.8 % (ref 42–52)
HGB BLD-MCNC: 16.3 G/DL (ref 14–18)
IMM GRANULOCYTES # BLD AUTO: 0.01 K/UL (ref 0–0.11)
IMM GRANULOCYTES NFR BLD AUTO: 0.2 % (ref 0–0.9)
LYMPHOCYTES # BLD AUTO: 1.51 K/UL (ref 1–4.8)
LYMPHOCYTES NFR BLD: 24.3 % (ref 22–41)
MCH RBC QN AUTO: 28.7 PG (ref 27–33)
MCHC RBC AUTO-ENTMCNC: 33.4 G/DL (ref 32.3–36.5)
MCV RBC AUTO: 86.1 FL (ref 81.4–97.8)
MONOCYTES # BLD AUTO: 0.38 K/UL (ref 0–0.85)
MONOCYTES NFR BLD AUTO: 6.1 % (ref 0–13.4)
NEUTROPHILS # BLD AUTO: 4.19 K/UL (ref 1.82–7.42)
NEUTROPHILS NFR BLD: 67.3 % (ref 44–72)
NRBC # BLD AUTO: 0 K/UL
NRBC BLD-RTO: 0 /100 WBC (ref 0–0.2)
PLATELET # BLD AUTO: 229 K/UL (ref 164–446)
PMV BLD AUTO: 10.5 FL (ref 9–12.9)
POTASSIUM SERPL-SCNC: 4.3 MMOL/L (ref 3.6–5.5)
PROT SERPL-MCNC: 6.7 G/DL (ref 6–8.2)
RBC # BLD AUTO: 5.67 M/UL (ref 4.7–6.1)
SODIUM SERPL-SCNC: 140 MMOL/L (ref 135–145)
WBC # BLD AUTO: 6.2 K/UL (ref 4.8–10.8)

## 2025-03-27 PROCEDURE — 80053 COMPREHEN METABOLIC PANEL: CPT

## 2025-03-27 PROCEDURE — 85025 COMPLETE CBC W/AUTO DIFF WBC: CPT

## 2025-03-27 PROCEDURE — 36415 COLL VENOUS BLD VENIPUNCTURE: CPT

## 2025-04-02 ENCOUNTER — HOSPITAL ENCOUNTER (OUTPATIENT)
Dept: RADIOLOGY | Facility: MEDICAL CENTER | Age: 65
End: 2025-04-02
Attending: INTERNAL MEDICINE
Payer: COMMERCIAL

## 2025-04-02 ENCOUNTER — OFFICE VISIT (OUTPATIENT)
Dept: SLEEP MEDICINE | Facility: MEDICAL CENTER | Age: 65
End: 2025-04-02
Attending: INTERNAL MEDICINE
Payer: COMMERCIAL

## 2025-04-02 VITALS
SYSTOLIC BLOOD PRESSURE: 142 MMHG | DIASTOLIC BLOOD PRESSURE: 80 MMHG | HEART RATE: 72 BPM | BODY MASS INDEX: 30.53 KG/M2 | HEIGHT: 66 IN | OXYGEN SATURATION: 94 % | WEIGHT: 190 LBS

## 2025-04-02 DIAGNOSIS — B99.9 RECURRENT INFECTIONS: ICD-10-CM

## 2025-04-02 DIAGNOSIS — J45.51 SEVERE PERSISTENT ASTHMA WITH EXACERBATION: ICD-10-CM

## 2025-04-02 DIAGNOSIS — R91.8 MULTIPLE NODULES OF LUNG: ICD-10-CM

## 2025-04-02 PROCEDURE — 99213 OFFICE O/P EST LOW 20 MIN: CPT | Performed by: INTERNAL MEDICINE

## 2025-04-02 PROCEDURE — 3077F SYST BP >= 140 MM HG: CPT | Performed by: INTERNAL MEDICINE

## 2025-04-02 PROCEDURE — 3079F DIAST BP 80-89 MM HG: CPT | Performed by: INTERNAL MEDICINE

## 2025-04-02 PROCEDURE — 99212 OFFICE O/P EST SF 10 MIN: CPT | Performed by: INTERNAL MEDICINE

## 2025-04-02 PROCEDURE — 71046 X-RAY EXAM CHEST 2 VIEWS: CPT

## 2025-04-02 RX ORDER — PREDNISONE 10 MG/1
TABLET ORAL
Qty: 40 TABLET | Refills: 1 | Status: SHIPPED | OUTPATIENT
Start: 2025-04-02

## 2025-04-02 ASSESSMENT — ENCOUNTER SYMPTOMS
SHORTNESS OF BREATH: 1
COUGH: 1
SPUTUM PRODUCTION: 1
WEAKNESS: 1
WHEEZING: 1

## 2025-04-02 ASSESSMENT — FIBROSIS 4 INDEX: FIB4 SCORE: 1.07

## 2025-04-02 NOTE — PROGRESS NOTES
Pulmonary Clinic follow up    Date of Service: 4/2/2025    Reason for follow up:  Follow-Up (Last seen 1/29/25 w/ Dr. Beverly for Eosinophilic asthma  /) and Results (PFT 2/5/25 )      Problem List Items Addressed This Visit       Multiple nodules of lung    Multiple pulmonary nodules also currently being managed for metastatic gastric cancer.         Severe persistent asthma with exacerbation    Some chest asthma exacerbations and some secondary to truly infections last due to sinus infection 2 weeks ago and now appears he has a pneumonia.  He does have phenotype asthma of eosinophilia  He does not want to start Biologics at this time as he is starting chemotherapy  Regardless at this time he does sound like he does have a right lower lobe pneumonia and was previously treated by doxycycline.  Would like to get a sputum culture to assess if this is a resistant bacteria, start Augmentin [noted a rash as a child at age 2 he is unclear if it was related to penicillin or not but that is what his mother told him] if patient does get a rash she will let us know.  He will also stop the antibiotic and await further instructions.  Tapering dose of prednisone for his exacerbation.  Chest x-ray.         Relevant Medications    amoxicillin-clavulanate (AUGMENTIN) 875-125 MG Tab    predniSONE (DELTASONE) 10 MG Tab    Other Relevant Orders    CULTURE RESPIRATORY W/ [facility] STN    IGE SERUM    DX-CHEST-2 VIEWS (Completed)    IMMUNOGLOBULINS A/E/G/M, SERUM    Recurrent infections    Mainly pulmonary and sinuses  Check immunoglobulins previously were normal              History of Present Illness: Prashant Hewitt is a 64 y.o. male with a past medical history of moderate to severe persistent asthma.  Has been on Advair 500, Spiriva Respimat, albuterol HFA as well as DuoNebs as needed.  He was last seen by Dr. Beverly 1/29/2025.  Found to have positive eosinophilia 9.1%.  Lifelong asthmatic since age 3.  Skin prick testing as a child was  negative.  When plan was to do further workup to evaluate for Biologics.  Patient has expressed concerns that he is having recurrent infections as a needed antibiotics a couple of times.  Last PFTs done in 2024 showed FEV1 1.144 L [45%], FVC 2.52 L [62%], ratio 57%.  No bronchodilator response.  Total lung capacity 6.17 L [96%], positive air trapping and DLCO 131%.  His FEV1 is reduced from 2019 as well as FVC.    Since his last appointment with pulmonary patient has been found to have stage IV metastatic gastric cancer.  He did the have this in 2017. 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  CT abdomen pelvis on 2/8/2025 noted multiple new peritoneal nodules and masses consistent with metastatic disease and biopsy of his liver was consistent with this.  Patient is to start chemotherapy.  Due to his multiple asthma exacerbations is here for further follow-up but did not get his lab work done for further assessment of Biologics.    Patient had a recent sinus infection last week and now he has some chest pain on the right side of his chest in particular with inspiration.  He is coughing up yellow sputum.  He is frustrated with recurrent infections.    Review of Systems   Constitutional:  Positive for malaise/fatigue.   HENT:          Sores in mouth   Respiratory:  Positive for cough, sputum production, shortness of breath and wheezing.    Cardiovascular:  Positive for chest pain.        Right side chest    Neurological:  Positive for weakness.       Current Outpatient Medications on File Prior to Visit   Medication Sig Dispense Refill    SUNItinib Malate 37.5 MG Cap       ondansetron (ZOFRAN ODT) 4 MG TABLET DISPERSIBLE       albuterol 108 (90 Base) MCG/ACT Aero Soln inhalation aerosol Inhale 2 Puffs every 6 hours as needed for Shortness of Breath. 8.5 g 2    methylPREDNISolone (MEDROL DOSEPAK) 4 MG Tablet Therapy Pack  Follow schedule on package instructions. 21 Tablet 0    albuterol (VENTOLIN HFA) 108 (90 Base) MCG/ACT Aero Soln inhalation aerosol Inhale 1 Puff every 6 hours as needed for Shortness of Breath. 2 Each 3    albuterol (PROVENTIL) 2.5mg/3ml Nebu Soln solution for nebulization INHALE 1 VIAL (3ML) VIA NABULIZATION EVERY 6 HOURS AS NEEDED FOR SHORTNESS OF BREATH 360 mL 0    fluticasone-salmeterol (ADVAIR DISKUS) 500-50 MCG/ACT AEROSOL POWDER, BREATH ACTIVATED Inhale 1 Puff 2 times a day. Rinse mouth after each use. 1 Each 11    sildenafil citrate (VIAGRA) 100 MG tablet Take 0.5 Tabs by mouth 1 time daily as needed for Erectile Dysfunction. 30 Tab 2     No current facility-administered medications on file prior to visit.       Social History     Tobacco Use    Smoking status: Never     Passive exposure: Yes    Smokeless tobacco: Never   Vaping Use    Vaping status: Never Used   Substance Use Topics    Alcohol use: Not Currently     Alcohol/week: 3.0 - 4.2 oz     Types: 5 - 7 Shots of liquor per week     Comment: Everyday    Drug use: Never        Past Medical History:   Diagnosis Date    Anesthesia     had rx to succinochline    Arthritis     Asthma without status asthmaticus 05/22/2018 2/24/25-exercise and cold temperature induced. Has rescue inhaler. Following with pulmonary    Bronchitis     chronic. Last 2024 x6.    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 Jin, Dr. Isabel (surgeon)     GERD (gastroesophageal reflux disease)     Heart burn     tums prn    Hiatus hernia syndrome     Kidney stones     approx 2022    Mass in the abdomen 02/08/2025    PERITONEAL MASS AND NODULE    Multiple nodules of lung 05/22/2018    Mumps     Pneumonia      "2017    Prediabetes 11/22/2022    Shingles     approx 2017    Shortness of breath     related to asthma    Snoring        Past Surgical History:   Procedure Laterality Date    ND UPPER GI ENDOSCOPY,BIOPSY  07/16/2019    Procedure: GASTROSCOPY, WITH BIOPSY;  Surgeon: Zhou Miller M.D.;  Location: [facility];  Service: EUS    GASTROSCOPY  07/16/2019    Procedure: GASTROSCOPY;  Surgeon: Zhou Miller M.D.;  Location: [facility];  Service: EUS    EGD W/ENDOSCOPIC ULTRASOUND  07/16/2019    Procedure: EGD, WITH ENDOSCOPIC US - UPPER CURVILINEAR;  Surgeon: Zhou Miller M.D.;  Location: [facility];  Service: EUS    EGD WITH ASP/BX  07/16/2019    Procedure: EGD, WITH ASPIRATION BIOPSY;  Surgeon: Zhou Miller M.D.;  Location: [facility];  Service: EUS    COLONOSCOPY  07/16/2019    OTHER ABDOMINAL SURGERY  03/2017    GIST (gastrointesinal stomal tumor) removal with 1/2 stomach  removed    FINGER AMPUTATION Right 1965    right index accident when 5 yo    KIMO BY LAPAROSCOPY      ENDOSCOPY      SEPTOPLASTY         Allergies: Succinylcholine, Penicillins, and Adrenalin [epinephrine]    Family History   Problem Relation Age of Onset    Hypertension Mother     Other Mother         glaucoma     Cancer Father         pancreatic     Heart Disease Father     Hypertension Brother        Vitals:    04/02/25 0859   Height: 1.676 m (5' 6\")   Weight: 86.2 kg (190 lb)   Weight % change since last entry.: 0 %   BP: (!) 142/80   Pulse: 72   BMI (Calculated): 30.67       Physical Examination  Physical Exam  Constitutional:       Appearance: He is ill-appearing.      Comments: Hoarse voice   HENT:      Head: Normocephalic and atraumatic.      Mouth/Throat:      Mouth: Mucous membranes are dry.   Eyes:      Pupils: Pupils are equal, round, and reactive to light.   Cardiovascular:      Rate and Rhythm: Normal rate.   Pulmonary:      Effort: Pulmonary effort is normal.      " Comments: Right sided posterior rhonchi  Musculoskeletal:         General: Normal range of motion.      Cervical back: Normal range of motion.   Skin:     General: Skin is warm.   Neurological:      General: No focal deficit present.      Mental Status: He is alert and oriented to person, place, and time.   Psychiatric:         Mood and Affect: Mood normal.         Behavior: Behavior normal.         Thought Content: Thought content normal.         Judgment: Judgment normal.                 Frances Mckeon M.D., MD MPH [facility]  [facility]

## 2025-04-03 PROBLEM — J45.51 SEVERE PERSISTENT ASTHMA WITH EXACERBATION: Status: ACTIVE | Noted: 2025-04-03

## 2025-04-03 PROBLEM — B99.9 RECURRENT INFECTIONS: Status: ACTIVE | Noted: 2025-04-03

## 2025-04-03 NOTE — ASSESSMENT & PLAN NOTE
Some chest asthma exacerbations and some secondary to truly infections last due to sinus infection 2 weeks ago and now appears he has a pneumonia.  He does have phenotype asthma of eosinophilia  He does not want to start Biologics at this time as he is starting chemotherapy  Regardless at this time he does sound like he does have a right lower lobe pneumonia and was previously treated by doxycycline.  Would like to get a sputum culture to assess if this is a resistant bacteria, start Augmentin [noted a rash as a child at age 2 he is unclear if it was related to penicillin or not but that is what his mother told him] if patient does get a rash she will let us know.  He will also stop the antibiotic and await further instructions.  Tapering dose of prednisone for his exacerbation.  Chest x-ray.

## 2025-04-04 ENCOUNTER — HOSPITAL ENCOUNTER (OUTPATIENT)
Dept: LAB | Facility: MEDICAL CENTER | Age: 65
End: 2025-04-04
Attending: INTERNAL MEDICINE
Payer: COMMERCIAL

## 2025-04-04 DIAGNOSIS — J45.51 SEVERE PERSISTENT ASTHMA WITH EXACERBATION: ICD-10-CM

## 2025-04-04 LAB
ALBUMIN SERPL BCP-MCNC: 4 G/DL (ref 3.2–4.9)
ALBUMIN/GLOB SERPL: 1.6 G/DL
ALP SERPL-CCNC: 91 U/L (ref 30–99)
ALT SERPL-CCNC: 30 U/L (ref 2–50)
ANION GAP SERPL CALC-SCNC: 11 MMOL/L (ref 7–16)
AST SERPL-CCNC: 23 U/L (ref 12–45)
BASOPHILS # BLD AUTO: 0.2 % (ref 0–1.8)
BASOPHILS # BLD: 0.01 K/UL (ref 0–0.12)
BILIRUB SERPL-MCNC: 1 MG/DL (ref 0.1–1.5)
BUN SERPL-MCNC: 17 MG/DL (ref 8–22)
CALCIUM ALBUM COR SERPL-MCNC: 9 MG/DL (ref 8.5–10.5)
CALCIUM SERPL-MCNC: 9 MG/DL (ref 8.5–10.5)
CHLORIDE SERPL-SCNC: 102 MMOL/L (ref 96–112)
CO2 SERPL-SCNC: 23 MMOL/L (ref 20–33)
CREAT SERPL-MCNC: 0.96 MG/DL (ref 0.5–1.4)
EOSINOPHIL # BLD AUTO: 0.04 K/UL (ref 0–0.51)
EOSINOPHIL NFR BLD: 0.7 % (ref 0–6.9)
ERYTHROCYTE [DISTWIDTH] IN BLOOD BY AUTOMATED COUNT: 42.5 FL (ref 35.9–50)
GFR SERPLBLD CREATININE-BSD FMLA CKD-EPI: 88 ML/MIN/1.73 M 2
GLOBULIN SER CALC-MCNC: 2.5 G/DL (ref 1.9–3.5)
GLUCOSE SERPL-MCNC: 102 MG/DL (ref 65–99)
HCT VFR BLD AUTO: 46.7 % (ref 42–52)
HGB BLD-MCNC: 16.1 G/DL (ref 14–18)
IMM GRANULOCYTES # BLD AUTO: 0.01 K/UL (ref 0–0.11)
IMM GRANULOCYTES NFR BLD AUTO: 0.2 % (ref 0–0.9)
LYMPHOCYTES # BLD AUTO: 1.52 K/UL (ref 1–4.8)
LYMPHOCYTES NFR BLD: 26.1 % (ref 22–41)
MCH RBC QN AUTO: 29.2 PG (ref 27–33)
MCHC RBC AUTO-ENTMCNC: 34.5 G/DL (ref 32.3–36.5)
MCV RBC AUTO: 84.6 FL (ref 81.4–97.8)
MONOCYTES # BLD AUTO: 0.41 K/UL (ref 0–0.85)
MONOCYTES NFR BLD AUTO: 7 % (ref 0–13.4)
NEUTROPHILS # BLD AUTO: 3.83 K/UL (ref 1.82–7.42)
NEUTROPHILS NFR BLD: 65.8 % (ref 44–72)
NRBC # BLD AUTO: 0 K/UL
NRBC BLD-RTO: 0 /100 WBC (ref 0–0.2)
PLATELET # BLD AUTO: 149 K/UL (ref 164–446)
PMV BLD AUTO: 10.8 FL (ref 9–12.9)
POTASSIUM SERPL-SCNC: 3.9 MMOL/L (ref 3.6–5.5)
PROT SERPL-MCNC: 6.5 G/DL (ref 6–8.2)
RBC # BLD AUTO: 5.52 M/UL (ref 4.7–6.1)
SODIUM SERPL-SCNC: 136 MMOL/L (ref 135–145)
T4 FREE SERPL-MCNC: 1.04 NG/DL (ref 0.93–1.7)
TSH SERPL-ACNC: 1.26 UIU/ML (ref 0.38–5.33)
WBC # BLD AUTO: 5.8 K/UL (ref 4.8–10.8)

## 2025-04-04 PROCEDURE — 80053 COMPREHEN METABOLIC PANEL: CPT

## 2025-04-04 PROCEDURE — 84439 ASSAY OF FREE THYROXINE: CPT

## 2025-04-04 PROCEDURE — 82784 ASSAY IGA/IGD/IGG/IGM EACH: CPT

## 2025-04-04 PROCEDURE — 36415 COLL VENOUS BLD VENIPUNCTURE: CPT

## 2025-04-04 PROCEDURE — 85025 COMPLETE CBC W/AUTO DIFF WBC: CPT

## 2025-04-04 PROCEDURE — 82785 ASSAY OF IGE: CPT

## 2025-04-04 PROCEDURE — 84443 ASSAY THYROID STIM HORMONE: CPT

## 2025-04-04 NOTE — Clinical Note
Member Name: Prashant Hewitt   Member Number: [id number]   Reference Number: [id number]   Approved Services: MRI/CAT Scan   Approved Service Dates: 04/04/2025 - 08/02/2025   Requesting Provider: Valentin Pugh   Requested Provider: [facility]     Dear Prashant Hewitt:     The following medical service(s) requested by Valentin Pugh have been approved:    Procedure Code Procedure Code Name Requested Quantity Approved Quantity Status   20287 (CPT®) CHG DIAGNOSTIC COMPUTED TOMOGRAPHY THORAX W/CONTRAST 1 1 Authorized       Approved Quantity means the number of visits approved for medication treatments and/or medical services.    The services should be provided by [facility] no later than 08/02/2025. Please contact the provider listed below with any questions.     Provider Information:  [facility]  [phone]    Your plan benefit may require a deductible, co-payment or coinsurance for these services. This authorization does not guarantee [facility] will pay the claim for services that you receive. Payment by [facility] for these services is subject to the terms of your Evidence of Coverage or Summary Plan Description, your eligibility at the time of service, and confirmation of benefit coverage.    For any questions or additional information, please contact Customer Service:    [facility]  Customer Service: [phone] or toll free [phone]  TTY users dial: 711   Call Center Hours: Mon - Fri 7 AM to 8 PM PST   Office Hours: Mon - Fri 8 AM to 5 PM [facility]   E-mail: [email or web address]   Website: [email or web address]       This information is available for free in other languages. Please contact Customer Service at the phone number above for more information. [facility] complies with applicable Federal civil rights laws and does not discriminate on the basis of race, color, national origin, age, disability  or sex.      Sincerely,     Healthcare Utilization Management Department     Cc: [facility]   Valentin Pugh

## 2025-04-05 ENCOUNTER — HOSPITAL ENCOUNTER (OUTPATIENT)
Dept: RADIOLOGY | Facility: MEDICAL CENTER | Age: 65
End: 2025-04-05
Attending: INTERNAL MEDICINE
Payer: COMMERCIAL

## 2025-04-05 DIAGNOSIS — R63.4 ABNORMAL LOSS OF WEIGHT: ICD-10-CM

## 2025-04-05 DIAGNOSIS — D64.9 RELATIVE ANEMIA: ICD-10-CM

## 2025-04-05 DIAGNOSIS — C49.A2 MALIGNANT GASTRIC STROMAL TUMOR (HCC): ICD-10-CM

## 2025-04-05 DIAGNOSIS — C49.A9 GASTROINTESTINAL STROMAL TUMOR OF OTHER SITES (HCC): ICD-10-CM

## 2025-04-05 LAB
IGA SERPL-MCNC: 174 MG/DL (ref 68–408)
IGE SERPL-ACNC: 10 KU/L
IGG SERPL-MCNC: 854 MG/DL (ref 768–1632)
IGM SERPL-MCNC: 76 MG/DL (ref 35–263)

## 2025-04-05 PROCEDURE — 71260 CT THORAX DX C+: CPT

## 2025-04-05 PROCEDURE — 700117 HCHG RX CONTRAST REV CODE 255: Performed by: INTERNAL MEDICINE

## 2025-04-05 RX ADMIN — IOHEXOL 25 ML: 240 INJECTION, SOLUTION INTRATHECAL; INTRAVASCULAR; INTRAVENOUS; ORAL at 17:52

## 2025-04-05 RX ADMIN — IOHEXOL 100 ML: 350 INJECTION, SOLUTION INTRAVENOUS at 17:52

## 2025-04-16 ENCOUNTER — TELEMEDICINE (OUTPATIENT)
Dept: SLEEP MEDICINE | Facility: MEDICAL CENTER | Age: 65
End: 2025-04-16
Attending: INTERNAL MEDICINE
Payer: COMMERCIAL

## 2025-04-16 VITALS — HEIGHT: 66 IN | WEIGHT: 184 LBS | BODY MASS INDEX: 29.57 KG/M2

## 2025-04-16 DIAGNOSIS — J45.51 SEVERE PERSISTENT ASTHMA WITH EXACERBATION: ICD-10-CM

## 2025-04-16 PROCEDURE — 99213 OFFICE O/P EST LOW 20 MIN: CPT | Mod: 95 | Performed by: INTERNAL MEDICINE

## 2025-04-16 ASSESSMENT — ENCOUNTER SYMPTOMS
NEUROLOGICAL NEGATIVE: 1
GASTROINTESTINAL NEGATIVE: 1
MUSCULOSKELETAL NEGATIVE: 1
CARDIOVASCULAR NEGATIVE: 1
RESPIRATORY NEGATIVE: 1
PSYCHIATRIC NEGATIVE: 1
EYES NEGATIVE: 1

## 2025-04-16 ASSESSMENT — FIBROSIS 4 INDEX: FIB4 SCORE: 1.8

## 2025-04-16 NOTE — OR NURSING
0929 To PACU from [facility] via bed. Pt awake, respirations spontaneous and non-labored. Abdomen soft, pt reports tenderness and discomfort to his left lower quadrant of his abdomen. Pt denies nausea.     0940 Pt appears very uncomfortable. Dr Romero and Dr Miller notified.     0950 Dr Miller assessed pt. Order received for IV pain medication, dose administered.     0955 Pt does report some relief following administration of pain medication, but pt still appears quite uncomfortable.     1010 Pt still appears very uncomfortable. Another dose of IV pain medication given as soon as possible.     1025 Pt reports drastic improvement to pain following second dose of IV pain medication.     1035 Pt's wife updated.     1040 Dr Miller reassessed pt.     1050 Report to JOSE Ramirez.       Vitamin D deficiency - Not currently taking anything. 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 Patient denies bone pain, fractures.. Vitamin D, 25-Hydroxy (ng/mL)       Date                     Value                 02/29/2024               23.8 (L)         Instructed on increasing foods rich in Vitamin D: salmon, mushrooms, fortified milk, soy milk, tofu, yogurt, orange juice, pork chops, eggs, and breakfast cereal.

## 2025-04-16 NOTE — PROGRESS NOTES
Telemedicine: Established Patient   This evaluation was conducted via Teams using secure and encrypted videoconferencing technology. The patient was in their home in the [facility].    The patient's identity was confirmed and verbal consent was obtained for this virtual visit.    Subjective:   CC:   Chief Complaint   Patient presents with    Follow-Up     Last seen 4/2/25 w/ Dr. Das for Severe persistent asthma with exacerbation    Results     Labs 4/5/25       Prashant Hewitt is a 64 y.o. male presenting for evaluation and management of:     past medical history of moderate to severe persistent asthma and metastatic gastric ca status post partial gastrectomy and treated with Gleevec which was started in 2017 stopped in 2022 but reoccured and now on chemotherapy. .  Has been on Advair 500, Spiriva Respimat, albuterol HFA as well as DuoNebs as needed.  He was last seen by Dr. Beverly 1/29/2025.  Found to have positive eosinophilia 9.1%.  Lifelong asthmatic since age 3.  Skin prick testing as a child was negative.  When plan was to do further workup to evaluate for Biologics.  Patient has expressed concerns that he is having recurrent infections as a needed antibiotics a couple of times.  Last PFTs done in 2024 showed FEV1 1.144 L [45%], FVC 2.52 L [62%], ratio 57%.  No bronchodilator response.  Total lung capacity 6.17 L [96%], positive air trapping and DLCO 131%.  His FEV1 is reduced from 2019 as well as FVC.    Last seen 4/2 when pt was symptomatic for infection and asthma excaerbation  Started on Augmentin and steroids  CXR no infiltrates but nodules  Immunoglobulins sent due to recurrent infections which are normal  IGE 10  Repeat Ct chest shows his metastatic peritoneal mets are smaller   Pt feels well and no complaints       Review of Systems   Constitutional:  Positive for malaise/fatigue.   HENT: Negative.     Eyes: Negative.    Respiratory: Negative.     Cardiovascular: Negative.    Gastrointestinal:  "Negative.    Genitourinary: Negative.    Musculoskeletal: Negative.    Skin: Negative.    Neurological: Negative.    Endo/Heme/Allergies: Negative.    Psychiatric/Behavioral: Negative.           Allergies   Allergen Reactions    Succinylcholine Unspecified     \"weakness and ill\", stopped breathing    Penicillins Rash     Rash as [age] year old    Adrenalin [Epinephrine] Unspecified     Highly sensitive.   7/17/20- pt states he can have epinephrine at reduced dose.       Current medicines (including changes today)  Current Outpatient Medications   Medication Sig Dispense Refill    amoxicillin-clavulanate (AUGMENTIN) 875-125 MG Tab Take 1 Tablet by mouth 2 times a day with meals. 28 Tablet 0    predniSONE (DELTASONE) 10 MG Tab Take 40mg x 4 days, then take 30mg x 4 days, then take 20mg x 4 days, then 10mg x 4 days with food, then discontinue. 40 Tablet 1    SUNItinib Malate 37.5 MG Cap       ondansetron (ZOFRAN ODT) 4 MG TABLET DISPERSIBLE       albuterol 108 (90 Base) MCG/ACT Aero Soln inhalation aerosol Inhale 2 Puffs every 6 hours as needed for Shortness of Breath. 8.5 g 2    methylPREDNISolone (MEDROL DOSEPAK) 4 MG Tablet Therapy Pack Follow schedule on package instructions. 21 Tablet 0    albuterol (VENTOLIN HFA) 108 (90 Base) MCG/ACT Aero Soln inhalation aerosol Inhale 1 Puff every 6 hours as needed for Shortness of Breath. 2 Each 3    albuterol (PROVENTIL) 2.5mg/3ml Nebu Soln solution for nebulization INHALE 1 VIAL (3ML) VIA NABULIZATION EVERY 6 HOURS AS NEEDED FOR SHORTNESS OF BREATH 360 mL 0    fluticasone-salmeterol (ADVAIR DISKUS) 500-50 MCG/ACT AEROSOL POWDER, BREATH ACTIVATED Inhale 1 Puff 2 times a day. Rinse mouth after each use. 1 Each 11    sildenafil citrate (VIAGRA) 100 MG tablet Take 0.5 Tabs by mouth 1 time daily as needed for Erectile Dysfunction. 30 Tab 2     No current facility-administered medications for this visit.       Patient Active Problem List    Diagnosis Date Noted    Severe persistent " "asthma with exacerbation 04/03/2025    Recurrent infections 04/03/2025    Soft tissue tumor, malignant (HCC) 02/25/2025    Abnormal CT scan, esophagus 01/29/2025    BMI 31.0-31.9,adult 01/29/2025    Trigger finger, right ring finger 06/19/2023    Trigger finger, right little finger 06/19/2023    Right shoulder pain 05/15/2023    Right hand pain 05/15/2023    Tick bites 05/15/2023    Prediabetes 11/22/2022    Fatty liver 11/22/2022    Eosinophilic asthma 05/22/2018    Multiple nodules of lung 05/22/2018    Gastrointestinal stromal tumor of stomach (HCC) 04/04/2017       Family History   Problem Relation Age of Onset    Hypertension Mother     Other Mother         glaucoma     Cancer Father         pancreatic     Heart Disease Father     Hypertension Brother        He  has a past medical history of Anesthesia, Arthritis, Asthma without status asthmaticus (05/22/2018), Bronchitis, Cancer (HCC) (2017), Chickenpox, Dental disorder, Fatty liver, Gastrointestinal stromal tumor of stomach (HCC) (04/04/2017), GERD (gastroesophageal reflux disease), Heart burn, Hiatus hernia syndrome, Kidney stones, Mass in the abdomen (02/08/2025), Multiple nodules of lung (05/22/2018), Mumps, Pneumonia, Prediabetes (11/22/2022), Shingles, Shortness of breath, and Snoring.    He has no past medical history of Wheezing.  He  has a past surgical history that includes other abdominal surgery (03/2017); septoplasty; finger amputation (Right, 1965); lorenza by laparoscopy; pr upper gi endoscopy,biopsy (07/16/2019); gastroscopy (07/16/2019); egd w/endoscopic ultrasound (07/16/2019); egd with asp/bx (07/16/2019); colonoscopy (07/16/2019); and endoscopy.       Objective:   Ht 1.676 m (5' 6\")   Wt 83.5 kg (184 lb)   BMI 29.70 kg/m²     Physical Exam  Speaking in full sentences NAD  Assessment and Plan:   The following treatment plan was discussed:     1. Severe persistent asthma with exacerbation  Some asthma exacerbations without infection and some " secondary to truly infections last due to sinus infection 2 weeks ago and now appears he has a pneumonia.  He does have phenotype asthma of eosinophilia previously  He does not want to start Biologics at this time as he is starting chemotherapy and agree  Will follow closely   He is on advair and albuterol prn  Has steroids         Follow-up: Return in about 8 weeks (around 6/11/2025) for with me and his choice telemedicine or inperson.

## 2025-05-03 ENCOUNTER — HOSPITAL ENCOUNTER (OUTPATIENT)
Dept: LAB | Facility: MEDICAL CENTER | Age: 65
End: 2025-05-03
Attending: INTERNAL MEDICINE
Payer: COMMERCIAL

## 2025-05-03 LAB
ALBUMIN SERPL BCP-MCNC: 3.8 G/DL (ref 3.2–4.9)
ALBUMIN/GLOB SERPL: 1.4 G/DL
ALP SERPL-CCNC: 79 U/L (ref 30–99)
ALT SERPL-CCNC: 36 U/L (ref 2–50)
ANION GAP SERPL CALC-SCNC: 13 MMOL/L (ref 7–16)
AST SERPL-CCNC: 29 U/L (ref 12–45)
BASOPHILS # BLD AUTO: 0 % (ref 0–1.8)
BASOPHILS # BLD: 0 K/UL (ref 0–0.12)
BILIRUB SERPL-MCNC: 1.1 MG/DL (ref 0.1–1.5)
BUN SERPL-MCNC: 14 MG/DL (ref 8–22)
CALCIUM ALBUM COR SERPL-MCNC: 9.3 MG/DL (ref 8.5–10.5)
CALCIUM SERPL-MCNC: 9.1 MG/DL (ref 8.5–10.5)
CHLORIDE SERPL-SCNC: 104 MMOL/L (ref 96–112)
CO2 SERPL-SCNC: 21 MMOL/L (ref 20–33)
CREAT SERPL-MCNC: 0.89 MG/DL (ref 0.5–1.4)
EOSINOPHIL # BLD AUTO: 0.08 K/UL (ref 0–0.51)
EOSINOPHIL NFR BLD: 2.9 % (ref 0–6.9)
ERYTHROCYTE [DISTWIDTH] IN BLOOD BY AUTOMATED COUNT: 49.7 FL (ref 35.9–50)
FASTING STATUS PATIENT QL REPORTED: NORMAL
GFR SERPLBLD CREATININE-BSD FMLA CKD-EPI: 95 ML/MIN/1.73 M 2
GLOBULIN SER CALC-MCNC: 2.7 G/DL (ref 1.9–3.5)
GLUCOSE SERPL-MCNC: 143 MG/DL (ref 65–99)
HCT VFR BLD AUTO: 45.6 % (ref 42–52)
HGB BLD-MCNC: 16 G/DL (ref 14–18)
IMM GRANULOCYTES # BLD AUTO: 0 K/UL (ref 0–0.11)
IMM GRANULOCYTES NFR BLD AUTO: 0 % (ref 0–0.9)
LYMPHOCYTES # BLD AUTO: 1 K/UL (ref 1–4.8)
LYMPHOCYTES NFR BLD: 36.2 % (ref 22–41)
MCH RBC QN AUTO: 30.6 PG (ref 27–33)
MCHC RBC AUTO-ENTMCNC: 35.1 G/DL (ref 32.3–36.5)
MCV RBC AUTO: 87.2 FL (ref 81.4–97.8)
MONOCYTES # BLD AUTO: 0.21 K/UL (ref 0–0.85)
MONOCYTES NFR BLD AUTO: 7.6 % (ref 0–13.4)
NEUTROPHILS # BLD AUTO: 1.47 K/UL (ref 1.82–7.42)
NEUTROPHILS NFR BLD: 53.3 % (ref 44–72)
NRBC # BLD AUTO: 0 K/UL
NRBC BLD-RTO: 0 /100 WBC (ref 0–0.2)
PLATELET # BLD AUTO: 106 K/UL (ref 164–446)
PMV BLD AUTO: 11.1 FL (ref 9–12.9)
POTASSIUM SERPL-SCNC: 3.9 MMOL/L (ref 3.6–5.5)
PROT SERPL-MCNC: 6.5 G/DL (ref 6–8.2)
RBC # BLD AUTO: 5.23 M/UL (ref 4.7–6.1)
SODIUM SERPL-SCNC: 138 MMOL/L (ref 135–145)
T4 FREE SERPL-MCNC: 1.07 NG/DL (ref 0.93–1.7)
TSH SERPL-ACNC: 5.3 UIU/ML (ref 0.38–5.33)
WBC # BLD AUTO: 2.8 K/UL (ref 4.8–10.8)

## 2025-05-03 PROCEDURE — 85025 COMPLETE CBC W/AUTO DIFF WBC: CPT

## 2025-05-03 PROCEDURE — 84439 ASSAY OF FREE THYROXINE: CPT

## 2025-05-03 PROCEDURE — 80053 COMPREHEN METABOLIC PANEL: CPT

## 2025-05-03 PROCEDURE — 84443 ASSAY THYROID STIM HORMONE: CPT

## 2025-05-03 PROCEDURE — 36415 COLL VENOUS BLD VENIPUNCTURE: CPT

## 2025-05-08 ENCOUNTER — HOSPITAL ENCOUNTER (OUTPATIENT)
Facility: MEDICAL CENTER | Age: 65
End: 2025-05-08
Attending: INTERNAL MEDICINE
Payer: COMMERCIAL

## 2025-05-18 NOTE — PROGRESS NOTES
"Subjective:     CC:  There were no encounter diagnoses.    HISTORY OF THE PRESENT ILLNESS: Patient is a 64 y.o. male. This pleasant patient is here today for jocelyne-operative evaluation. The perioperative evaluation is requested by [facility].    Perioperative evaluation- Prashant is pending EGD and colonoscopy with deep sedation on 7/1/25 with Dr. Ralph Enriquez.  Prashant' underlying medical conditions including eosinophilic asthma, GI stromal tumor of the stomach, multiple pulmonary nodules, prediabetes and fatty liver disease. He reports having an EGD and colonoscopy 10 years ago without any complications from the procedures.  He reports no history from anesthesia or sedation for procedures in the past except when he was given succinylcholine which made him \"stop breathing.\"    Prashant had a recent CT chest on 4/5/25 which showed a \"few stable tiny reticulonodular densities in the right lower lobe, which appear post inflammatory/infectious.  No suspicious pulmonary nodule identified... no adenopathy.\"  He had a recent telemedicine appointment with his pulmonologist, Dr. Das, on 4/16/25 at which time Prashant was feeling well s/p treatment for PNA and asthma exacerbation on 4/2/25 with augmentin and steroids.  He had no negative side effects to the augmentin.        Since his appointment with Dr. Das, he has not felt ill.  No cough, sputum, hemoptysis.  He has mild SOB and wheezing daily which is his baseline for years and does not limit his activities.  No nausea or vomiting.  He has fluctuating bowels when having chemotherapy but his stools are currently normal.     He has not used any albuterol this month.        Patient-specific risk factors for perioperative pulmonary complications:  1.  Age - 64    2.  Chronic obstructive pulmonary disease - no    3.  ASA class > 2 - no: 2:pt with mild systemic disease which does not limited his activities    4.  Functional dependence- does all his own IADLS and ADLS    5. " " History of obstructive sleep apnea-  no    6.  Cigarette use within last 8 weeks- no    7.  FEV1 or peak expiratory flow less than 80% of predicted values of personal best- yes.  PFTs on 2/5/25 showed FEV1 of 39% of predicted.      Procedure related risk factors for perioperative pulmonary complications:  1.  Surgery > 3 hours - no  2.  Use of long-acting neuromuscular blockade- no, procedure is planned for deep propofol sedation  3.  Thoracic/neurosurgery- no  4.  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  Eyes: no changes in vision  ENT: no sore throat, no bloody nose  Pulm: per hpi.  No cough  CV: no chest pain, no palpitations  GI: no nausea/vomiting  : no dysuria  MSk: no myalgias  Skin: +rashes since seeing chemotherapy and has mouth sores  Neuro: +occasional headaches since starting chemotherapy  Heme/Lymph: no abnormal bleeding      Objective:     Exam: BP (!) 140/82 (BP Location: Right arm, Patient Position: Sitting, BP Cuff Size: Adult)   Pulse 71   Resp 18   Ht 1.676 m (5' 6\")   Wt 82.1 kg (181 lb)   SpO2 97%  Body mass index is 29.21 " kg/m².    General: Normal appearing. No distress.  HEENT: Normocephalic.  Canals are clear bilaterally, tympanic membranes are benign, nasal mucosa benign, oropharynx is without erythema, edema or exudates.   Eyes: Eyes conjunctiva clear lids without ptosis, pupils equal and reactive to light accommodation, ears normal shape and contour  Neck: Supple. Thyroid is not enlarged.  Pulmonary: Clear to ausculation.  Normal effort. No rales, ronchi, or wheezing.  Cardiovascular: Regular rate and rhythm without murmur.   Abdomen: Soft, nontender, nondistended. Normal bowel sounds.   Neurologic: No gross motor deficits  Lymph: No cervical or supraclavicular lymph nodes are palpable  Skin: Warm and dry.  No obvious lesions.  Musculoskeletal: No extremity cyanosis, clubbing, or edema.  Psych: Normal mood and affect. Alert and oriented. Judgment and insight is normal.        Assessment & Plan:   64 y.o. male with the following -    1. Severe persistent asthma, unspecified whether complicated  Chronic, improved over the last 6 weeks s/p treatment for PNA with augmentin and prednisone.  Prashant has had asthma exacerbations within the last year but has been feeling well for the last month and has not required any albuterol for the last month.  RR 18 today.  He has required prednisone 3+ times over the last year, but his symptoms have improved over the last month status post augmentin and a 16 day course of prednisone.  Recommended he continue his current inhalers (advair 500-50 mcg/act BID and albuterol 1-2 puffs every 6 hrs prn) and follow-up with his pulmonologist next month for routine asthma follow-up or sooner if symptoms worsen.     2. Encounter for preoperative pulmonary examination (Primary)  We discussed that the goal of a perioperative pulmonary evaluation is to identify patients at high risk of significant postoperative pulmonary complications, so appropriate interventions can be provided to minimize that risk.  We  discussed that complications can arise from anesthesia, the surgery itself, and underlying respiratory conditions.      ARISCAT index: 3 points, 1.6% risk of in hospital post-op pulmonary complications (composite including respiratory failure, respiratory infection, pleural effusion, atelectasis, pneumothorax, bronchospasm, aspiration pneumonitis).  Hgb 16 on 5/3/25    Rojas postoperative respiratory failure risk score: 1.6% risk of mechanical ventilation for >48 hours after surgery or unplanned intubation < 30 days of surgery.    Recommendations to minimize perioperative pulmonary complications:    Continue current inhalers preoperatively and resume his inhalers after the procedure.  2.  Avoid long acting neuromuscular blockade intraoperatively if possible  3.  Minimize length of surgery as much as possible  4.  Encourage incentive spirometry and voluntary deep breaths and early mobilization when deemed safe from a surgical perspective.    With the above evaluation and considerations in mind, the patient is clinically stable to have the procedure at the [facility] outpatient procedure center from a pulmonary perspective for a non-emergent, time-sensitive elective surgery.  However, if he has new or worsening symptoms prior to the procedure, he is to hold getting the procedure and seek medical attention.    Return in about 4 weeks (around 6/16/2025) for follow-up pulmonary appointment for asthma follow-up with Dr. Das.    Please note that this dictation was created using voice recognition software. I have made every reasonable attempt to correct obvious errors, but I expect that there are errors of grammar and possibly content that I did not discover before finalizing the note.         [1]   Current Outpatient Medications Ordered in Epic   Medication Sig Dispense Refill    SUNItinib Malate 37.5 MG Cap       ondansetron (ZOFRAN ODT) 4 MG TABLET DISPERSIBLE       albuterol 108 (90 Base) MCG/ACT Aero Soln inhalation  aerosol Inhale 2 Puffs every 6 hours as needed for Shortness of Breath. 8.5 g 2    albuterol (VENTOLIN HFA) 108 (90 Base) MCG/ACT Aero Soln inhalation aerosol Inhale 1 Puff every 6 hours as needed for Shortness of Breath. 2 Each 3    albuterol (PROVENTIL) 2.5mg/3ml Nebu Soln solution for nebulization INHALE 1 VIAL (3ML) VIA NABULIZATION EVERY 6 HOURS AS NEEDED FOR SHORTNESS OF BREATH 360 mL 0    fluticasone-salmeterol (ADVAIR DISKUS) 500-50 MCG/ACT AEROSOL POWDER, BREATH ACTIVATED Inhale 1 Puff 2 times a day. Rinse mouth after each use. 1 Each 11    sildenafil citrate (VIAGRA) 100 MG tablet Take 0.5 Tabs by mouth 1 time daily as needed for Erectile Dysfunction. 30 Tab 2     No current Epic-ordered facility-administered medications on file.   [2]   Past Medical History:  Diagnosis Date    Anesthesia     had rx to succinochline    Arthritis     Asthma without status asthmaticus 05/22/2018 2/24/25-exercise and cold temperature induced. Has rescue inhaler. Following with pulmonary    Bronchitis     chronic. Last 2024 x6.    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 Jin, Dr. Isabel (surgeon)     GERD (gastroesophageal reflux disease)     Heart burn     tums prn    Hiatus hernia syndrome     Kidney stones     approx 2022    Mass in the abdomen 02/08/2025    PERITONEAL MASS AND NODULE    Multiple nodules of lung 05/22/2018    Mumps     Pneumonia     2017    Prediabetes 11/22/2022    Shingles     approx 2017    Shortness of breath     related to asthma    Snoring    [3]   Past Surgical History:  Procedure Laterality Date    RI UPPER GI ENDOSCOPY,BIOPSY  07/16/2019    Procedure: GASTROSCOPY, WITH BIOPSY;  Surgeon: Zhou Miller M.D.;  Location:  SURGERY [facility];  Service: EUS    GASTROSCOPY  07/16/2019    Procedure: GASTROSCOPY;  Surgeon: Zhou Miller M.D.;  Location: [facility];  Service: EUS    EGD W/ENDOSCOPIC ULTRASOUND  07/16/2019    Procedure: EGD, WITH ENDOSCOPIC US - UPPER CURVILINEAR;  Surgeon: Zhou Miller M.D.;  Location: [facility];  Service: EUS    EGD WITH ASP/BX  07/16/2019    Procedure: EGD, WITH ASPIRATION BIOPSY;  Surgeon: Zhou Miller M.D.;  Location: [facility];  Service: EUS    COLONOSCOPY  07/16/2019    OTHER ABDOMINAL SURGERY  03/2017    GIST (gastrointesinal stomal tumor) removal with 1/2 stomach  removed    FINGER AMPUTATION Right 1965    right index accident when 5 yo    KIMO BY LAPAROSCOPY      ENDOSCOPY      SEPTOPLASTY     [4]   Social History  Tobacco Use    Smoking status: Never     Passive exposure: Yes    Smokeless tobacco: Never   Vaping Use    Vaping status: Never Used   Substance Use Topics    Alcohol use: Not Currently     Alcohol/week: 3.0 - 4.2 oz     Types: 5 - 7 Shots of liquor per week     Comment: Everyday    Drug use: Never

## 2025-05-19 ENCOUNTER — OFFICE VISIT (OUTPATIENT)
Dept: SLEEP MEDICINE | Facility: MEDICAL CENTER | Age: 65
End: 2025-05-19
Attending: FAMILY MEDICINE
Payer: COMMERCIAL

## 2025-05-19 VITALS
RESPIRATION RATE: 18 BRPM | DIASTOLIC BLOOD PRESSURE: 82 MMHG | SYSTOLIC BLOOD PRESSURE: 140 MMHG | HEIGHT: 66 IN | BODY MASS INDEX: 29.09 KG/M2 | HEART RATE: 71 BPM | OXYGEN SATURATION: 97 % | WEIGHT: 181 LBS

## 2025-05-19 DIAGNOSIS — J45.50 SEVERE PERSISTENT ASTHMA, UNSPECIFIED WHETHER COMPLICATED: ICD-10-CM

## 2025-05-19 DIAGNOSIS — Z01.811 ENCOUNTER FOR PREOPERATIVE PULMONARY EXAMINATION: Primary | ICD-10-CM

## 2025-05-19 PROCEDURE — 3077F SYST BP >= 140 MM HG: CPT | Performed by: FAMILY MEDICINE

## 2025-05-19 PROCEDURE — 99212 OFFICE O/P EST SF 10 MIN: CPT | Performed by: FAMILY MEDICINE

## 2025-05-19 PROCEDURE — 3079F DIAST BP 80-89 MM HG: CPT | Performed by: FAMILY MEDICINE

## 2025-05-19 PROCEDURE — 99214 OFFICE O/P EST MOD 30 MIN: CPT | Performed by: FAMILY MEDICINE

## 2025-05-19 ASSESSMENT — FIBROSIS 4 INDEX: FIB4 SCORE: 2.92

## 2025-06-02 ENCOUNTER — HOSPITAL ENCOUNTER (OUTPATIENT)
Dept: LAB | Facility: MEDICAL CENTER | Age: 65
End: 2025-06-02
Attending: INTERNAL MEDICINE
Payer: COMMERCIAL

## 2025-06-02 LAB
ALBUMIN SERPL BCP-MCNC: 4.1 G/DL (ref 3.2–4.9)
ALBUMIN/GLOB SERPL: 2 G/DL
ALP SERPL-CCNC: 75 U/L (ref 30–99)
ALT SERPL-CCNC: 36 U/L (ref 2–50)
ANION GAP SERPL CALC-SCNC: 13 MMOL/L (ref 7–16)
ANISOCYTOSIS BLD QL SMEAR: ABNORMAL
AST SERPL-CCNC: 30 U/L (ref 12–45)
BASOPHILS # BLD AUTO: 0 % (ref 0–1.8)
BASOPHILS # BLD: 0 K/UL (ref 0–0.12)
BILIRUB SERPL-MCNC: 1.1 MG/DL (ref 0.1–1.5)
BUN SERPL-MCNC: 11 MG/DL (ref 8–22)
CALCIUM ALBUM COR SERPL-MCNC: 8.8 MG/DL (ref 8.5–10.5)
CALCIUM SERPL-MCNC: 8.9 MG/DL (ref 8.5–10.5)
CHLORIDE SERPL-SCNC: 104 MMOL/L (ref 96–112)
CO2 SERPL-SCNC: 23 MMOL/L (ref 20–33)
CREAT SERPL-MCNC: 1.05 MG/DL (ref 0.5–1.4)
EOSINOPHIL # BLD AUTO: 0.12 K/UL (ref 0–0.51)
EOSINOPHIL NFR BLD: 4.3 % (ref 0–6.9)
ERYTHROCYTE [DISTWIDTH] IN BLOOD BY AUTOMATED COUNT: 52.4 FL (ref 35.9–50)
GFR SERPLBLD CREATININE-BSD FMLA CKD-EPI: 79 ML/MIN/1.73 M 2
GLOBULIN SER CALC-MCNC: 2.1 G/DL (ref 1.9–3.5)
GLUCOSE SERPL-MCNC: 119 MG/DL (ref 65–99)
HCT VFR BLD AUTO: 41.4 % (ref 42–52)
HGB BLD-MCNC: 15.4 G/DL (ref 14–18)
LYMPHOCYTES # BLD AUTO: 1.2 K/UL (ref 1–4.8)
LYMPHOCYTES NFR BLD: 44.4 % (ref 22–41)
MACROCYTES BLD QL SMEAR: ABNORMAL
MANUAL DIFF BLD: NORMAL
MCH RBC QN AUTO: 31.8 PG (ref 27–33)
MCHC RBC AUTO-ENTMCNC: 37.2 G/DL (ref 32.3–36.5)
MCV RBC AUTO: 85.5 FL (ref 81.4–97.8)
MICROCYTES BLD QL SMEAR: ABNORMAL
MONOCYTES # BLD AUTO: 0.2 K/UL (ref 0–0.85)
MONOCYTES NFR BLD AUTO: 7.7 % (ref 0–13.4)
MORPHOLOGY BLD-IMP: NORMAL
NEUTROPHILS # BLD AUTO: 1.18 K/UL (ref 1.82–7.42)
NEUTROPHILS NFR BLD: 43.6 % (ref 44–72)
NRBC # BLD AUTO: 0 K/UL
NRBC BLD-RTO: 0 /100 WBC (ref 0–0.2)
PLATELET # BLD AUTO: 107 K/UL (ref 164–446)
PLATELET BLD QL SMEAR: NORMAL
PMV BLD AUTO: 11.4 FL (ref 9–12.9)
POTASSIUM SERPL-SCNC: 3.6 MMOL/L (ref 3.6–5.5)
PROT SERPL-MCNC: 6.2 G/DL (ref 6–8.2)
RBC # BLD AUTO: 4.84 M/UL (ref 4.7–6.1)
RBC BLD AUTO: PRESENT
SODIUM SERPL-SCNC: 140 MMOL/L (ref 135–145)
VARIANT LYMPHS BLD QL SMEAR: NORMAL
WBC # BLD AUTO: 2.7 K/UL (ref 4.8–10.8)

## 2025-06-02 PROCEDURE — 85027 COMPLETE CBC AUTOMATED: CPT

## 2025-06-02 PROCEDURE — 36415 COLL VENOUS BLD VENIPUNCTURE: CPT

## 2025-06-02 PROCEDURE — 85007 BL SMEAR W/DIFF WBC COUNT: CPT

## 2025-06-02 PROCEDURE — 80053 COMPREHEN METABOLIC PANEL: CPT

## 2025-07-07 ENCOUNTER — OFFICE VISIT (OUTPATIENT)
Dept: SLEEP MEDICINE | Facility: MEDICAL CENTER | Age: 65
End: 2025-07-07
Attending: INTERNAL MEDICINE
Payer: COMMERCIAL

## 2025-07-07 ENCOUNTER — HOSPITAL ENCOUNTER (OUTPATIENT)
Dept: LAB | Facility: MEDICAL CENTER | Age: 65
End: 2025-07-07
Attending: INTERNAL MEDICINE
Payer: COMMERCIAL

## 2025-07-07 VITALS
DIASTOLIC BLOOD PRESSURE: 84 MMHG | SYSTOLIC BLOOD PRESSURE: 142 MMHG | HEIGHT: 66 IN | BODY MASS INDEX: 28.12 KG/M2 | WEIGHT: 175 LBS | HEART RATE: 67 BPM | OXYGEN SATURATION: 98 %

## 2025-07-07 DIAGNOSIS — J82.83 EOSINOPHILIC ASTHMA: ICD-10-CM

## 2025-07-07 DIAGNOSIS — Z79.899 HIGH RISK MEDICATION USE: Primary | ICD-10-CM

## 2025-07-07 DIAGNOSIS — Z79.899 HIGH RISK MEDICATION USE: ICD-10-CM

## 2025-07-07 DIAGNOSIS — J45.51 SEVERE PERSISTENT ASTHMA WITH ACUTE EXACERBATION: ICD-10-CM

## 2025-07-07 LAB
BASOPHILS # BLD AUTO: 0.3 % (ref 0–1.8)
BASOPHILS # BLD: 0.01 K/UL (ref 0–0.12)
EOSINOPHIL # BLD AUTO: 0.11 K/UL (ref 0–0.51)
EOSINOPHIL NFR BLD: 2.9 % (ref 0–6.9)
ERYTHROCYTE [DISTWIDTH] IN BLOOD BY AUTOMATED COUNT: 55.5 FL (ref 35.9–50)
HCT VFR BLD AUTO: 42.9 % (ref 42–52)
HGB BLD-MCNC: 15.4 G/DL (ref 14–18)
IMM GRANULOCYTES # BLD AUTO: 0.01 K/UL (ref 0–0.11)
IMM GRANULOCYTES NFR BLD AUTO: 0.3 % (ref 0–0.9)
INR PPP: 1.07 (ref 0.87–1.13)
LYMPHOCYTES # BLD AUTO: 0.9 K/UL (ref 1–4.8)
LYMPHOCYTES NFR BLD: 24.1 % (ref 22–41)
MCH RBC QN AUTO: 33.6 PG (ref 27–33)
MCHC RBC AUTO-ENTMCNC: 35.9 G/DL (ref 32.3–36.5)
MCV RBC AUTO: 93.7 FL (ref 81.4–97.8)
MONOCYTES # BLD AUTO: 0.34 K/UL (ref 0–0.85)
MONOCYTES NFR BLD AUTO: 9.1 % (ref 0–13.4)
NEUTROPHILS # BLD AUTO: 2.36 K/UL (ref 1.82–7.42)
NEUTROPHILS NFR BLD: 63.3 % (ref 44–72)
NRBC # BLD AUTO: 0 K/UL
NRBC BLD-RTO: 0 /100 WBC (ref 0–0.2)
PLATELET # BLD AUTO: 111 K/UL (ref 164–446)
PMV BLD AUTO: 11.2 FL (ref 9–12.9)
PROTHROMBIN TIME: 13.9 SEC (ref 12–14.6)
RBC # BLD AUTO: 4.58 M/UL (ref 4.7–6.1)
WBC # BLD AUTO: 3.7 K/UL (ref 4.8–10.8)

## 2025-07-07 PROCEDURE — 3077F SYST BP >= 140 MM HG: CPT | Performed by: INTERNAL MEDICINE

## 2025-07-07 PROCEDURE — 85025 COMPLETE CBC W/AUTO DIFF WBC: CPT

## 2025-07-07 PROCEDURE — 36415 COLL VENOUS BLD VENIPUNCTURE: CPT

## 2025-07-07 PROCEDURE — 99214 OFFICE O/P EST MOD 30 MIN: CPT | Performed by: INTERNAL MEDICINE

## 2025-07-07 PROCEDURE — 85610 PROTHROMBIN TIME: CPT

## 2025-07-07 PROCEDURE — 99213 OFFICE O/P EST LOW 20 MIN: CPT | Performed by: INTERNAL MEDICINE

## 2025-07-07 PROCEDURE — 3079F DIAST BP 80-89 MM HG: CPT | Performed by: INTERNAL MEDICINE

## 2025-07-07 RX ORDER — PREDNISONE 10 MG/1
TABLET ORAL
Qty: 40 TABLET | Refills: 1 | Status: SHIPPED | OUTPATIENT
Start: 2025-07-07

## 2025-07-07 ASSESSMENT — ENCOUNTER SYMPTOMS
DIARRHEA: 1
DIZZINESS: 1

## 2025-07-07 ASSESSMENT — FIBROSIS 4 INDEX: FIB4 SCORE: 2.99

## 2025-07-07 NOTE — PROGRESS NOTES
Pulmonary Clinic follow up    Date of Service: 7/7/2025    Reason for follow up:  Follow-Up (Last seen 4/16/25 w/ Dr. Das for  Severe persistent asthma with exacerbation )      Problem List Items Addressed This Visit       Eosinophilic asthma    Severe persistent asthma [previously can more moderate but has been needing more steroids with FEV1 of 52% and FEV1 over FVC 52%]  Last exacerbation when I saw him last month.  On Advair HFA, Spiriva and albuterol as needed  Flutter valve 3 times daily  Currently wheezing which is concerning for asthma but also concerned that he might be anemic and maybe that is the cause of his wheezing.  Prednisone given in case he is having an asthma exacerbation.  We will wait for the blood work which she is getting done stat today         Severe persistent asthma    Relevant Medications    predniSONE (DELTASONE) 10 MG Tab    High risk medication use - Primary    And oral chemo for his metastatic gastric CA  He appears pale today  Will get stat CBC and coags         Relevant Orders    CBC WITH DIFFERENTIAL    Prothrombin Time         History of Present Illness: Prashant Hewitt is a 64 y.o. male with a past medical history of  moderate to severe persistent asthma.  Has been on Advair 500, Spiriva Respimat, albuterol HFA as well as DuoNebs as needed.  He was last seen by Dr. Beverly 1/29/2025.  Found to have positive eosinophilia 9.1%.  Lifelong asthmatic since age 3. Patient also has metastatic gastric ca post partial gastrectomy and treated with Gleevec which was started in 2017 stopped in 2022. CT abdomen pelvis on 2/8/2025 noted multiple new peritoneal nodules and masses consistent with metastatic disease and biopsy of his liver was consistent with this. Patienthas been on chemotherapy.   I last saw patient on 4/2/2025 where concern was for pneumonia vs infection  Pt was seen in our clinic for preop evaluation on 5/19/2025 and     EGD and colonoscopy done in June and bx of esophagus  "c/w esophagitis and also Barretts esophagus     Patient has been feeling weak and dizzy in the last 3 to 4 days.  His last blood work was done June 2, 2025  He is on oral chemotherapy for his metastatic gastric cancer.  He has been having diarrhea intermittently and the diarrhea he describes as dark-colored maroonish and seems to be worse over the last few days.    In terms of his asthma he had been feeling fine until the last couple days where he feels more short of breath and is not sure if it is related to his asthma or not.  He is on Advair 500, Spiriva Respimat and albuterol as needed neck  Has been using his albuterol maybe once a day but its only the last 2 or 3 days that he has been suddenly feeling a little worse but not his usual asthma symptoms.    My control score is 20 out of 25       Review of Systems   Constitutional:  Positive for malaise/fatigue.   Gastrointestinal:  Positive for diarrhea and melena.   Neurological:  Positive for dizziness.       Medications Ordered Prior to Encounter[1]    Social History[2]     Past Medical History[3]    Past Surgical History[4]    Allergies: Succinylcholine, Penicillins, and Adrenalin [epinephrine]    Family History   Problem Relation Age of Onset    Hypertension Mother     Other Mother         glaucoma     Cancer Father         pancreatic     Heart Disease Father     Cancer Sister         breast    Hypertension Brother     Lung Disease Son         asthma       Vitals:    07/07/25 0902   Height: 1.676 m (5' 6\")   Weight: 79.4 kg (175 lb)   Weight % change since last entry.: 0 %   BP: (!) 142/84   Pulse: 67   BMI (Calculated): 28.25       Physical Examination  Physical Exam  Constitutional:       Comments: Pale appearing   HENT:      Head: Normocephalic and atraumatic.      Mouth/Throat:      Mouth: Mucous membranes are moist.   Eyes:      Extraocular Movements: Extraocular movements intact.      Pupils: Pupils are equal, round, and reactive to light. "   Cardiovascular:      Rate and Rhythm: Normal rate.   Pulmonary:      Breath sounds: Wheezing present.   Musculoskeletal:      Cervical back: Normal range of motion.   Skin:     General: Skin is warm.   Neurological:      General: No focal deficit present.      Mental Status: He is alert and oriented to person, place, and time.   Psychiatric:         Mood and Affect: Mood normal.         Behavior: Behavior normal.         Thought Content: Thought content normal.         Judgment: Judgment normal.                  Frances Mckeon M.D., MD MPH [facility]  [facility]         [1]   Current Outpatient Medications on File Prior to Visit   Medication Sig Dispense Refill    SUNItinib Malate 37.5 MG Cap       ondansetron (ZOFRAN ODT) 4 MG TABLET DISPERSIBLE       albuterol 108 (90 Base) MCG/ACT Aero Soln inhalation aerosol Inhale 2 Puffs every 6 hours as needed for Shortness of Breath. 8.5 g 2    albuterol (VENTOLIN HFA) 108 (90 Base) MCG/ACT Aero Soln inhalation aerosol Inhale 1 Puff every 6 hours as needed for Shortness of Breath. 2 Each 3    albuterol (PROVENTIL) 2.5mg/3ml Nebu Soln solution for nebulization INHALE 1 VIAL (3ML) VIA NABULIZATION EVERY 6 HOURS AS NEEDED FOR SHORTNESS OF BREATH 360 mL 0    fluticasone-salmeterol (ADVAIR DISKUS) 500-50 MCG/ACT AEROSOL POWDER, BREATH ACTIVATED Inhale 1 Puff 2 times a day. Rinse mouth after each use. 1 Each 11    sildenafil citrate (VIAGRA) 100 MG tablet Take 0.5 Tabs by mouth 1 time daily as needed for Erectile Dysfunction. 30 Tab 2     No current facility-administered medications on file prior to visit.   [2]   Social History  Tobacco Use    Smoking status: Never     Passive exposure: Yes    Smokeless tobacco: Never   Vaping Use    Vaping status: Never Used   Substance Use Topics    Alcohol use: Not Currently     Alcohol/week: 3.0 - 4.2 oz     Types: 5 - 7 Shots of liquor per week     Comment: Everyday    Drug use: Never   [3]   Past Medical  History:  Diagnosis Date    Anesthesia     had rx to succinochline    Arthritis     Asthma without status asthmaticus 05/22/2018 2/24/25-exercise and cold temperature induced. Has rescue inhaler. Following with pulmonary    Bronchitis     chronic. Last 2024 x6.    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 Jin, Dr. Isabel (surgeon)     GERD (gastroesophageal reflux disease)     Heart burn     tums prn    Hiatus hernia syndrome     Kidney stones     approx 2022    Mass in the abdomen 02/08/2025    PERITONEAL MASS AND NODULE    Multiple nodules of lung 05/22/2018    Mumps     Pneumonia     2017    Prediabetes 11/22/2022    Shingles     approx 2017    Shortness of breath     related to asthma    Snoring    [4]   Past Surgical History:  Procedure Laterality Date    AK UPPER GI ENDOSCOPY,BIOPSY  07/16/2019    Procedure: GASTROSCOPY, WITH BIOPSY;  Surgeon: Zhou Miller M.D.;  Location: [facility];  Service: EUS    GASTROSCOPY  07/16/2019    Procedure: GASTROSCOPY;  Surgeon: Zhou Miller M.D.;  Location: [facility];  Service: EUS    EGD W/ENDOSCOPIC ULTRASOUND  07/16/2019    Procedure: EGD, WITH ENDOSCOPIC US - UPPER CURVILINEAR;  Surgeon: Zhou Miller M.D.;  Location: [facility];  Service: EUS    EGD WITH ASP/BX  07/16/2019    Procedure: EGD, WITH ASPIRATION BIOPSY;  Surgeon: Zhou Miller M.D.;  Location: [facility];  Service: EUS    COLONOSCOPY  07/16/2019    OTHER ABDOMINAL SURGERY  03/2017    GIST (gastrointesinal stomal tumor) removal with 1/2 stomach  removed    FINGER AMPUTATION Right 1965    right index accident when 5 yo    KIMO BY LAPAROSCOPY      ENDOSCOPY       SEPTOPLASTY

## 2025-07-07 NOTE — ASSESSMENT & PLAN NOTE
Severe persistent asthma [previously can more moderate but has been needing more steroids with FEV1 of 52% and FEV1 over FVC 52%]  Last exacerbation when I saw him last month.  On Advair HFA, Spiriva and albuterol as needed  Flutter valve 3 times daily  Currently wheezing which is concerning for asthma but also concerned that he might be anemic and maybe that is the cause of his wheezing.  Prednisone given in case he is having an asthma exacerbation.  We will wait for the blood work which she is getting done stat today

## 2025-07-08 RX ORDER — AZITHROMYCIN 250 MG/1
TABLET, FILM COATED ORAL
Qty: 6 TABLET | Refills: 0 | Status: SHIPPED | OUTPATIENT
Start: 2025-07-08

## 2025-07-10 ENCOUNTER — HOSPITAL ENCOUNTER (OUTPATIENT)
Dept: LAB | Facility: MEDICAL CENTER | Age: 65
End: 2025-07-10
Attending: INTERNAL MEDICINE
Payer: COMMERCIAL

## 2025-07-10 LAB
BASOPHILS # BLD AUTO: 0 % (ref 0–1.8)
BASOPHILS # BLD: 0 K/UL (ref 0–0.12)
EOSINOPHIL # BLD AUTO: 0 K/UL (ref 0–0.51)
EOSINOPHIL NFR BLD: 0 % (ref 0–6.9)
ERYTHROCYTE [DISTWIDTH] IN BLOOD BY AUTOMATED COUNT: 56.5 FL (ref 35.9–50)
HCT VFR BLD AUTO: 41.8 % (ref 42–52)
HGB BLD-MCNC: 14.2 G/DL (ref 14–18)
IMM GRANULOCYTES # BLD AUTO: 0 K/UL (ref 0–0.11)
IMM GRANULOCYTES NFR BLD AUTO: 0 % (ref 0–0.9)
LYMPHOCYTES # BLD AUTO: 0.37 K/UL (ref 1–4.8)
LYMPHOCYTES NFR BLD: 8.2 % (ref 22–41)
MCH RBC QN AUTO: 32.8 PG (ref 27–33)
MCHC RBC AUTO-ENTMCNC: 34 G/DL (ref 32.3–36.5)
MCV RBC AUTO: 96.5 FL (ref 81.4–97.8)
MONOCYTES # BLD AUTO: 0.14 K/UL (ref 0–0.85)
MONOCYTES NFR BLD AUTO: 3.1 % (ref 0–13.4)
NEUTROPHILS # BLD AUTO: 4 K/UL (ref 1.82–7.42)
NEUTROPHILS NFR BLD: 88.7 % (ref 44–72)
NRBC # BLD AUTO: 0 K/UL
NRBC BLD-RTO: 0 /100 WBC (ref 0–0.2)
PLATELET # BLD AUTO: 129 K/UL (ref 164–446)
PMV BLD AUTO: 11.4 FL (ref 9–12.9)
RBC # BLD AUTO: 4.33 M/UL (ref 4.7–6.1)
WBC # BLD AUTO: 4.5 K/UL (ref 4.8–10.8)

## 2025-07-10 PROCEDURE — 80053 COMPREHEN METABOLIC PANEL: CPT

## 2025-07-10 PROCEDURE — 36415 COLL VENOUS BLD VENIPUNCTURE: CPT

## 2025-07-10 PROCEDURE — 85025 COMPLETE CBC W/AUTO DIFF WBC: CPT

## 2025-07-11 LAB
ALBUMIN SERPL BCP-MCNC: 4.2 G/DL (ref 3.2–4.9)
ALBUMIN/GLOB SERPL: 1.8 G/DL
ALP SERPL-CCNC: 65 U/L (ref 30–99)
ALT SERPL-CCNC: 40 U/L (ref 2–50)
ANION GAP SERPL CALC-SCNC: 15 MMOL/L (ref 7–16)
AST SERPL-CCNC: 33 U/L (ref 12–45)
BILIRUB SERPL-MCNC: 1.2 MG/DL (ref 0.1–1.5)
BUN SERPL-MCNC: 20 MG/DL (ref 8–22)
CALCIUM ALBUM COR SERPL-MCNC: 8.9 MG/DL (ref 8.5–10.5)
CALCIUM SERPL-MCNC: 9.1 MG/DL (ref 8.5–10.5)
CHLORIDE SERPL-SCNC: 104 MMOL/L (ref 96–112)
CO2 SERPL-SCNC: 21 MMOL/L (ref 20–33)
CREAT SERPL-MCNC: 0.89 MG/DL (ref 0.5–1.4)
GFR SERPLBLD CREATININE-BSD FMLA CKD-EPI: 95 ML/MIN/1.73 M 2
GLOBULIN SER CALC-MCNC: 2.4 G/DL (ref 1.9–3.5)
GLUCOSE SERPL-MCNC: 130 MG/DL (ref 65–99)
POTASSIUM SERPL-SCNC: 3.7 MMOL/L (ref 3.6–5.5)
PROT SERPL-MCNC: 6.6 G/DL (ref 6–8.2)
SODIUM SERPL-SCNC: 140 MMOL/L (ref 135–145)

## 2025-07-14 NOTE — Clinical Note
Member Name: Prsahant Hewitt   Member Number: [id number]   Reference Number: [id number]   Approved Services: MRI/CAT Scan   Approved Service Dates: 07/14/2025 - 11/11/2025   Requesting Provider: Valentin Pugh   Requested Provider: [facility]     Dear Prashant Hewitt:     The following medical service(s) requested by Valentin Pugh have been approved:    Procedure Code Procedure Code Name Requested Quantity Approved Quantity Status   32891 (CPT®) CHG DIAGNOSTIC COMPUTED TOMOGRAPHY THORAX W/CONTRAST 1 1 Authorized       Approved Quantity means the number of visits approved for medication treatments and/or medical services.    The services should be provided by [facility] no later than 11/11/2025. Please contact the provider listed below with any questions.     Provider Information:  [facility]  [phone]    Your plan benefit may require a deductible, co-payment or coinsurance for these services. This authorization does not guarantee [facility] will pay the claim for services that you receive. Payment by [facility] for these services is subject to the terms of your Evidence of Coverage or Summary Plan Description, your eligibility at the time of service, and confirmation of benefit coverage.    For any questions or additional information, please contact Customer Service:    [facility]  Customer Service: [phone] or toll free [phone]  TTY users dial: [phone]   Call Center Hours: Mon - Fri 7 AM to 8 PM PST   Office Hours: Mon - Fri 8 AM to 5 PM PST   E-mail: [email or web address]   Website: [email or web address]       This information is available for free in other languages. Please contact Customer Service at the phone number above for more information. [facility] complies with applicable Federal civil rights laws and does not discriminate on the basis of race, color, national origin, age, disability or  sex.      Sincerely,     Healthcare Utilization Management Department     Cc: [facility]   Valentin Pugh

## 2025-07-17 ENCOUNTER — HOSPITAL ENCOUNTER (OUTPATIENT)
Dept: RADIOLOGY | Facility: MEDICAL CENTER | Age: 65
End: 2025-07-17
Attending: INTERNAL MEDICINE
Payer: COMMERCIAL

## 2025-07-17 DIAGNOSIS — C49.A9 GASTROINTESTINAL STROMAL TUMOR OF OTHER SITES (HCC): ICD-10-CM

## 2025-07-17 DIAGNOSIS — R63.4 LOSS OF WEIGHT: ICD-10-CM

## 2025-07-17 DIAGNOSIS — G89.3 NEOPLASM RELATED PAIN: ICD-10-CM

## 2025-07-17 DIAGNOSIS — D64.9 RELATIVE ANEMIA: ICD-10-CM

## 2025-07-17 PROCEDURE — 71260 CT THORAX DX C+: CPT

## 2025-07-17 PROCEDURE — 700117 HCHG RX CONTRAST REV CODE 255: Performed by: INTERNAL MEDICINE

## 2025-07-17 RX ADMIN — IOHEXOL 100 ML: 350 INJECTION, SOLUTION INTRAVENOUS at 11:00

## 2025-08-06 ENCOUNTER — OFFICE VISIT (OUTPATIENT)
Dept: SLEEP MEDICINE | Facility: MEDICAL CENTER | Age: 65
End: 2025-08-06
Attending: INTERNAL MEDICINE
Payer: COMMERCIAL

## 2025-08-06 VITALS
OXYGEN SATURATION: 96 % | BODY MASS INDEX: 28.28 KG/M2 | HEART RATE: 79 BPM | WEIGHT: 176 LBS | HEIGHT: 66 IN | SYSTOLIC BLOOD PRESSURE: 132 MMHG | DIASTOLIC BLOOD PRESSURE: 80 MMHG

## 2025-08-06 DIAGNOSIS — J22 ACUTE RESPIRATORY INFECTION: ICD-10-CM

## 2025-08-06 DIAGNOSIS — J82.83 EOSINOPHILIC ASTHMA: Primary | ICD-10-CM

## 2025-08-06 DIAGNOSIS — J45.909 ASTHMA WITHOUT STATUS ASTHMATICUS WITHOUT COMPLICATION, UNSPECIFIED ASTHMA SEVERITY, UNSPECIFIED WHETHER PERSISTENT: Chronic | ICD-10-CM

## 2025-08-06 PROCEDURE — 99213 OFFICE O/P EST LOW 20 MIN: CPT | Performed by: INTERNAL MEDICINE

## 2025-08-06 PROCEDURE — 3075F SYST BP GE 130 - 139MM HG: CPT | Performed by: INTERNAL MEDICINE

## 2025-08-06 PROCEDURE — 3079F DIAST BP 80-89 MM HG: CPT | Performed by: INTERNAL MEDICINE

## 2025-08-06 PROCEDURE — 99212 OFFICE O/P EST SF 10 MIN: CPT | Performed by: INTERNAL MEDICINE

## 2025-08-06 RX ORDER — FLUTICASONE PROPIONATE AND SALMETEROL 500; 50 UG/1; UG/1
1 POWDER RESPIRATORY (INHALATION) 2 TIMES DAILY
Qty: 1 EACH | Refills: 11 | Status: SHIPPED | OUTPATIENT
Start: 2025-08-06

## 2025-08-06 RX ORDER — ALBUTEROL SULFATE 90 UG/1
1 INHALANT RESPIRATORY (INHALATION) EVERY 6 HOURS PRN
Qty: 2 EACH | Refills: 3 | Status: SHIPPED | OUTPATIENT
Start: 2025-08-06

## 2025-08-06 ASSESSMENT — ENCOUNTER SYMPTOMS
CONSTITUTIONAL NEGATIVE: 1
GASTROINTESTINAL NEGATIVE: 1
MUSCULOSKELETAL NEGATIVE: 1
PSYCHIATRIC NEGATIVE: 1
COUGH: 1
SHORTNESS OF BREATH: 1
NEUROLOGICAL NEGATIVE: 1
EYES NEGATIVE: 1
CARDIOVASCULAR NEGATIVE: 1

## 2025-08-06 ASSESSMENT — FIBROSIS 4 INDEX: FIB4 SCORE: 2.59

## (undated) DEVICE — SPONGE GAUZE NON-STERILE 4X4 - (2000/CA 10PK/CA)

## (undated) DEVICE — SET EXTENSION WITH 2 PORTS (48EA/CA) ***PART #2C8610 IS A SUBSTITUTE*****

## (undated) DEVICE — SENSOR SPO2 ADULT LNCS ADTX (20/BX) ORDER ITEM #19593

## (undated) DEVICE — SYRINGE SAFETY 10 ML 18 GA X 1 1/2 BLUNT LL (100/BX 4BX/CA)

## (undated) DEVICE — ELECTRODE 850 FOAM ADHESIVE - HYDROGEL RADIOTRNSPRNT (50/PK)

## (undated) DEVICE — SYRINGE SAFETY 5 ML 18 GA X 1-1/2 BLUNT LL (100/BX 4BX/CA)

## (undated) DEVICE — CATHETER IV SAFETY 20 GA X 1-1/4 (50/BX)

## (undated) DEVICE — MASK ANESTHESIA ADULT  - (100/CA)

## (undated) DEVICE — BLOCK BITE ENDOSCOPIC 2809 - (100/BX) INTERMEDIATE

## (undated) DEVICE — CANNULA W/ SUPPLY TUBING O2 - (50/CA)

## (undated) DEVICE — NEPTUNE 4 PORT MANIFOLD - (20/PK)

## (undated) DEVICE — SYRINGE SAFETY 3 ML 18 GA X 1 1/2 BLUNT LL (100/BX 8BX/CA)

## (undated) DEVICE — MASK WITH FACE SHIELD (25/BX 4BX/CA)

## (undated) DEVICE — KIT  I.V. START (100EA/CA)

## (undated) DEVICE — TUBE CONNECTING SUCTION - CLEAR PLASTIC STERILE 72 IN (50EA/CA)

## (undated) DEVICE — TUBING CLEARLINK DUO-VENT - C-FLO (48EA/CA)

## (undated) DEVICE — GOWN SURGEONS LARGE - (32/CA)

## (undated) DEVICE — TUBE SUCTION YANKAUER  1/4 X 6FT (20EA/CA)"

## (undated) DEVICE — BITE BLOCK ADULT 60FR (100EA/CA)

## (undated) DEVICE — GLOVE, LITE (PAIR)

## (undated) DEVICE — LACTATED RINGERS INJ 1000 ML - (14EA/CA 60CA/PF)

## (undated) DEVICE — BASIN EMESIS DISP. - (250/CA)

## (undated) DEVICE — SYRINGE DISP. 50CC LS - (40/BX)